# Patient Record
Sex: FEMALE | Race: WHITE | Employment: PART TIME | URBAN - METROPOLITAN AREA
[De-identification: names, ages, dates, MRNs, and addresses within clinical notes are randomized per-mention and may not be internally consistent; named-entity substitution may affect disease eponyms.]

---

## 2017-01-11 ENCOUNTER — OFFICE VISIT (OUTPATIENT)
Dept: FAMILY MEDICINE CLINIC | Age: 67
End: 2017-01-11

## 2017-01-11 VITALS
HEART RATE: 76 BPM | DIASTOLIC BLOOD PRESSURE: 74 MMHG | HEIGHT: 65 IN | SYSTOLIC BLOOD PRESSURE: 112 MMHG | TEMPERATURE: 97.1 F | WEIGHT: 239 LBS | BODY MASS INDEX: 39.82 KG/M2 | RESPIRATION RATE: 14 BRPM

## 2017-01-11 DIAGNOSIS — E11.8 TYPE 2 DIABETES MELLITUS WITH COMPLICATION, WITH LONG-TERM CURRENT USE OF INSULIN (HCC): Primary | ICD-10-CM

## 2017-01-11 DIAGNOSIS — I10 ESSENTIAL HYPERTENSION: ICD-10-CM

## 2017-01-11 DIAGNOSIS — Z79.4 TYPE 2 DIABETES MELLITUS WITH COMPLICATION, WITH LONG-TERM CURRENT USE OF INSULIN (HCC): Primary | ICD-10-CM

## 2017-01-11 DIAGNOSIS — E78.2 MIXED HYPERLIPIDEMIA: ICD-10-CM

## 2017-01-11 DIAGNOSIS — L90.0 LICHEN SCLEROSUS: ICD-10-CM

## 2017-01-11 PROCEDURE — 99214 OFFICE O/P EST MOD 30 MIN: CPT | Performed by: FAMILY MEDICINE

## 2017-03-08 ENCOUNTER — TELEPHONE (OUTPATIENT)
Dept: FAMILY MEDICINE CLINIC | Age: 67
End: 2017-03-08

## 2017-05-10 DIAGNOSIS — I10 ESSENTIAL HYPERTENSION: ICD-10-CM

## 2017-05-10 DIAGNOSIS — Z79.4 TYPE 2 DIABETES MELLITUS WITH COMPLICATION, WITH LONG-TERM CURRENT USE OF INSULIN (HCC): Primary | ICD-10-CM

## 2017-05-10 DIAGNOSIS — E78.2 MIXED HYPERLIPIDEMIA: ICD-10-CM

## 2017-05-10 DIAGNOSIS — E11.8 TYPE 2 DIABETES MELLITUS WITH COMPLICATION, WITH LONG-TERM CURRENT USE OF INSULIN (HCC): Primary | ICD-10-CM

## 2017-05-10 RX ORDER — AMLODIPINE BESYLATE 10 MG/1
TABLET ORAL
Qty: 90 TABLET | Refills: 1 | Status: SHIPPED | OUTPATIENT
Start: 2017-05-10 | End: 2017-11-06 | Stop reason: SDUPTHER

## 2017-05-11 DIAGNOSIS — Z79.4 TYPE 2 DIABETES MELLITUS WITH COMPLICATION, WITH LONG-TERM CURRENT USE OF INSULIN (HCC): ICD-10-CM

## 2017-05-11 DIAGNOSIS — E78.2 MIXED HYPERLIPIDEMIA: ICD-10-CM

## 2017-05-11 DIAGNOSIS — I10 ESSENTIAL HYPERTENSION: ICD-10-CM

## 2017-05-11 DIAGNOSIS — E11.8 TYPE 2 DIABETES MELLITUS WITH COMPLICATION, WITH LONG-TERM CURRENT USE OF INSULIN (HCC): ICD-10-CM

## 2017-05-11 LAB
ALBUMIN SERPL-MCNC: 4.3 G/DL (ref 3.9–4.9)
ALP BLD-CCNC: 64 U/L (ref 40–130)
ALT SERPL-CCNC: 41 U/L (ref 0–33)
ANION GAP SERPL CALCULATED.3IONS-SCNC: 12 MEQ/L (ref 7–13)
AST SERPL-CCNC: 36 U/L (ref 0–35)
BILIRUB SERPL-MCNC: 0.8 MG/DL (ref 0–1.2)
BUN BLDV-MCNC: 22 MG/DL (ref 8–23)
CALCIUM SERPL-MCNC: 10.1 MG/DL (ref 8.6–10.2)
CHLORIDE BLD-SCNC: 99 MEQ/L (ref 98–107)
CHOLESTEROL, TOTAL: 181 MG/DL (ref 0–199)
CO2: 31 MEQ/L (ref 22–29)
CREAT SERPL-MCNC: 0.54 MG/DL (ref 0.5–0.9)
GFR AFRICAN AMERICAN: >60
GFR NON-AFRICAN AMERICAN: >60
GLOBULIN: 2.6 G/DL (ref 2.3–3.5)
GLUCOSE BLD-MCNC: 90 MG/DL (ref 74–109)
HBA1C MFR BLD: 6.8 % (ref 4.8–5.9)
HDLC SERPL-MCNC: 50 MG/DL (ref 40–59)
LDL CHOLESTEROL CALCULATED: 97 MG/DL (ref 0–129)
POTASSIUM SERPL-SCNC: 3.8 MEQ/L (ref 3.5–5.1)
SODIUM BLD-SCNC: 142 MEQ/L (ref 132–144)
TOTAL PROTEIN: 6.9 G/DL (ref 6.4–8.1)
TRIGL SERPL-MCNC: 170 MG/DL (ref 0–200)

## 2017-05-17 ENCOUNTER — OFFICE VISIT (OUTPATIENT)
Dept: FAMILY MEDICINE CLINIC | Age: 67
End: 2017-05-17

## 2017-05-17 VITALS
SYSTOLIC BLOOD PRESSURE: 126 MMHG | DIASTOLIC BLOOD PRESSURE: 84 MMHG | HEIGHT: 65 IN | BODY MASS INDEX: 38.32 KG/M2 | WEIGHT: 230 LBS | HEART RATE: 72 BPM | RESPIRATION RATE: 12 BRPM | TEMPERATURE: 97 F

## 2017-05-17 DIAGNOSIS — Z79.4 TYPE 2 DIABETES MELLITUS WITH COMPLICATION, WITH LONG-TERM CURRENT USE OF INSULIN (HCC): Primary | ICD-10-CM

## 2017-05-17 DIAGNOSIS — E78.2 MIXED HYPERLIPIDEMIA: ICD-10-CM

## 2017-05-17 DIAGNOSIS — Z12.31 ENCOUNTER FOR SCREENING MAMMOGRAM FOR BREAST CANCER: ICD-10-CM

## 2017-05-17 DIAGNOSIS — I10 ESSENTIAL HYPERTENSION: ICD-10-CM

## 2017-05-17 DIAGNOSIS — E11.8 TYPE 2 DIABETES MELLITUS WITH COMPLICATION, WITH LONG-TERM CURRENT USE OF INSULIN (HCC): Primary | ICD-10-CM

## 2017-05-17 PROCEDURE — 3017F COLORECTAL CA SCREEN DOC REV: CPT | Performed by: FAMILY MEDICINE

## 2017-05-17 PROCEDURE — 3044F HG A1C LEVEL LT 7.0%: CPT | Performed by: FAMILY MEDICINE

## 2017-05-17 PROCEDURE — 3014F SCREEN MAMMO DOC REV: CPT | Performed by: FAMILY MEDICINE

## 2017-05-17 PROCEDURE — G8400 PT W/DXA NO RESULTS DOC: HCPCS | Performed by: FAMILY MEDICINE

## 2017-05-17 PROCEDURE — G8419 CALC BMI OUT NRM PARAM NOF/U: HCPCS | Performed by: FAMILY MEDICINE

## 2017-05-17 PROCEDURE — 99214 OFFICE O/P EST MOD 30 MIN: CPT | Performed by: FAMILY MEDICINE

## 2017-05-17 PROCEDURE — 1036F TOBACCO NON-USER: CPT | Performed by: FAMILY MEDICINE

## 2017-05-17 PROCEDURE — G8427 DOCREV CUR MEDS BY ELIG CLIN: HCPCS | Performed by: FAMILY MEDICINE

## 2017-05-17 PROCEDURE — 4040F PNEUMOC VAC/ADMIN/RCVD: CPT | Performed by: FAMILY MEDICINE

## 2017-05-17 PROCEDURE — 1090F PRES/ABSN URINE INCON ASSESS: CPT | Performed by: FAMILY MEDICINE

## 2017-05-17 PROCEDURE — 1123F ACP DISCUSS/DSCN MKR DOCD: CPT | Performed by: FAMILY MEDICINE

## 2017-05-17 ASSESSMENT — PATIENT HEALTH QUESTIONNAIRE - PHQ9
SUM OF ALL RESPONSES TO PHQ9 QUESTIONS 1 & 2: 0
1. LITTLE INTEREST OR PLEASURE IN DOING THINGS: 0
SUM OF ALL RESPONSES TO PHQ QUESTIONS 1-9: 0
2. FEELING DOWN, DEPRESSED OR HOPELESS: 0

## 2017-05-30 DIAGNOSIS — I10 ESSENTIAL HYPERTENSION: ICD-10-CM

## 2017-05-30 RX ORDER — VALSARTAN AND HYDROCHLOROTHIAZIDE 160; 12.5 MG/1; MG/1
TABLET, FILM COATED ORAL
Qty: 90 TABLET | Refills: 1 | Status: SHIPPED | OUTPATIENT
Start: 2017-05-30 | End: 2017-11-27 | Stop reason: SDUPTHER

## 2017-06-01 ENCOUNTER — HOSPITAL ENCOUNTER (OUTPATIENT)
Dept: WOMENS IMAGING | Age: 67
Discharge: HOME | End: 2017-06-01
Payer: MEDICARE

## 2017-06-01 DIAGNOSIS — Z12.31 ENCOUNTER FOR SCREENING MAMMOGRAM FOR BREAST CANCER: ICD-10-CM

## 2017-06-01 PROCEDURE — G0202 SCR MAMMO BI INCL CAD: HCPCS

## 2017-06-26 RX ORDER — METOPROLOL TARTRATE 100 MG/1
TABLET ORAL
Qty: 180 TABLET | Refills: 1 | Status: SHIPPED | OUTPATIENT
Start: 2017-06-26 | End: 2017-11-27 | Stop reason: SDUPTHER

## 2017-08-31 DIAGNOSIS — Z79.4 TYPE 2 DIABETES MELLITUS WITH COMPLICATION, WITH LONG-TERM CURRENT USE OF INSULIN (HCC): ICD-10-CM

## 2017-08-31 DIAGNOSIS — E11.8 TYPE 2 DIABETES MELLITUS WITH COMPLICATION, WITH LONG-TERM CURRENT USE OF INSULIN (HCC): ICD-10-CM

## 2017-09-07 DIAGNOSIS — L90.0 LICHEN SCLEROSUS: ICD-10-CM

## 2017-09-08 RX ORDER — HYDROCORTISONE BUTYRATE 0.1 %
CREAM (GRAM) TOPICAL
Qty: 90 G | Refills: 1 | Status: SHIPPED | OUTPATIENT
Start: 2017-09-08 | End: 2019-01-18 | Stop reason: SDUPTHER

## 2017-09-08 RX ORDER — INDAPAMIDE 1.25 MG/1
1.25 TABLET, FILM COATED ORAL EVERY MORNING
Qty: 90 TABLET | Refills: 3 | Status: SHIPPED | OUTPATIENT
Start: 2017-09-08 | End: 2018-04-27 | Stop reason: SDUPTHER

## 2017-09-08 RX ORDER — COLCHICINE 0.6 MG/1
TABLET ORAL
Qty: 30 TABLET | Refills: 1 | Status: SHIPPED | OUTPATIENT
Start: 2017-09-08 | End: 2018-04-27 | Stop reason: SDUPTHER

## 2017-09-08 RX ORDER — OXICONAZOLE NITRATE 10 MG/G
CREAM TOPICAL
Qty: 60 G | Refills: 3 | Status: SHIPPED | OUTPATIENT
Start: 2017-09-08 | End: 2019-01-18 | Stop reason: SDUPTHER

## 2017-10-30 RX ORDER — COLCHICINE 0.6 MG/1
TABLET, FILM COATED ORAL
Qty: 30 TABLET | Refills: 3 | Status: SHIPPED | OUTPATIENT
Start: 2017-10-30 | End: 2019-03-04

## 2017-11-06 RX ORDER — AMLODIPINE BESYLATE 10 MG/1
TABLET ORAL
Qty: 90 TABLET | Refills: 1 | Status: SHIPPED | OUTPATIENT
Start: 2017-11-06 | End: 2017-11-27 | Stop reason: SDUPTHER

## 2017-11-17 DIAGNOSIS — Z79.4 TYPE 2 DIABETES MELLITUS WITH COMPLICATION, WITH LONG-TERM CURRENT USE OF INSULIN (HCC): Primary | ICD-10-CM

## 2017-11-17 DIAGNOSIS — E11.8 TYPE 2 DIABETES MELLITUS WITH COMPLICATION, WITH LONG-TERM CURRENT USE OF INSULIN (HCC): Primary | ICD-10-CM

## 2017-11-17 DIAGNOSIS — E78.2 MIXED HYPERLIPIDEMIA: ICD-10-CM

## 2017-11-17 DIAGNOSIS — Z79.4 TYPE 2 DIABETES MELLITUS WITH COMPLICATION, WITH LONG-TERM CURRENT USE OF INSULIN (HCC): ICD-10-CM

## 2017-11-17 DIAGNOSIS — E11.8 TYPE 2 DIABETES MELLITUS WITH COMPLICATION, WITH LONG-TERM CURRENT USE OF INSULIN (HCC): ICD-10-CM

## 2017-11-17 DIAGNOSIS — I10 ESSENTIAL HYPERTENSION: ICD-10-CM

## 2017-11-17 LAB
ALBUMIN SERPL-MCNC: 4.2 G/DL (ref 3.9–4.9)
ALP BLD-CCNC: 65 U/L (ref 40–130)
ALT SERPL-CCNC: 51 U/L (ref 0–33)
ANION GAP SERPL CALCULATED.3IONS-SCNC: 17 MEQ/L (ref 7–13)
AST SERPL-CCNC: 45 U/L (ref 0–35)
BASOPHILS ABSOLUTE: 0 K/UL (ref 0–0.2)
BASOPHILS RELATIVE PERCENT: 0.6 %
BILIRUB SERPL-MCNC: 0.8 MG/DL (ref 0–1.2)
BUN BLDV-MCNC: 16 MG/DL (ref 8–23)
CALCIUM SERPL-MCNC: 9.5 MG/DL (ref 8.6–10.2)
CHLORIDE BLD-SCNC: 95 MEQ/L (ref 98–107)
CHOLESTEROL, TOTAL: 186 MG/DL (ref 0–199)
CO2: 26 MEQ/L (ref 22–29)
CREAT SERPL-MCNC: 0.58 MG/DL (ref 0.5–0.9)
CREATININE URINE: 168.5 MG/DL
EOSINOPHILS ABSOLUTE: 0.2 K/UL (ref 0–0.7)
EOSINOPHILS RELATIVE PERCENT: 4.4 %
GFR AFRICAN AMERICAN: >60
GFR NON-AFRICAN AMERICAN: >60
GLOBULIN: 2.7 G/DL (ref 2.3–3.5)
GLUCOSE BLD-MCNC: 160 MG/DL (ref 74–109)
HBA1C MFR BLD: 8 % (ref 4.8–5.9)
HCT VFR BLD CALC: 41.2 % (ref 37–47)
HDLC SERPL-MCNC: 48 MG/DL (ref 40–59)
HEMOGLOBIN: 14.2 G/DL (ref 12–16)
LDL CHOLESTEROL CALCULATED: 93 MG/DL (ref 0–129)
LYMPHOCYTES ABSOLUTE: 1.8 K/UL (ref 1–4.8)
LYMPHOCYTES RELATIVE PERCENT: 36.4 %
MCH RBC QN AUTO: 31.6 PG (ref 27–31.3)
MCHC RBC AUTO-ENTMCNC: 34.6 % (ref 33–37)
MCV RBC AUTO: 91.4 FL (ref 82–100)
MICROALBUMIN UR-MCNC: 8.6 MG/DL
MICROALBUMIN/CREAT UR-RTO: 51 MG/G (ref 0–30)
MONOCYTES ABSOLUTE: 0.4 K/UL (ref 0.2–0.8)
MONOCYTES RELATIVE PERCENT: 9.2 %
NEUTROPHILS ABSOLUTE: 2.4 K/UL (ref 1.4–6.5)
NEUTROPHILS RELATIVE PERCENT: 49.4 %
PDW BLD-RTO: 13.9 % (ref 11.5–14.5)
PLATELET # BLD: 175 K/UL (ref 130–400)
POTASSIUM SERPL-SCNC: 3.7 MEQ/L (ref 3.5–5.1)
RBC # BLD: 4.51 M/UL (ref 4.2–5.4)
SODIUM BLD-SCNC: 138 MEQ/L (ref 132–144)
TOTAL PROTEIN: 6.9 G/DL (ref 6.4–8.1)
TRIGL SERPL-MCNC: 223 MG/DL (ref 0–200)
WBC # BLD: 4.9 K/UL (ref 4.8–10.8)

## 2017-11-27 ENCOUNTER — OFFICE VISIT (OUTPATIENT)
Dept: FAMILY MEDICINE CLINIC | Age: 67
End: 2017-11-27

## 2017-11-27 VITALS
SYSTOLIC BLOOD PRESSURE: 138 MMHG | HEART RATE: 71 BPM | WEIGHT: 237 LBS | BODY MASS INDEX: 39.49 KG/M2 | RESPIRATION RATE: 14 BRPM | OXYGEN SATURATION: 99 % | HEIGHT: 65 IN | DIASTOLIC BLOOD PRESSURE: 88 MMHG | TEMPERATURE: 97.4 F

## 2017-11-27 DIAGNOSIS — E78.2 MIXED HYPERLIPIDEMIA: ICD-10-CM

## 2017-11-27 DIAGNOSIS — I10 ESSENTIAL HYPERTENSION: ICD-10-CM

## 2017-11-27 DIAGNOSIS — Z23 NEED FOR PNEUMOCOCCAL VACCINATION: ICD-10-CM

## 2017-11-27 DIAGNOSIS — E11.8 TYPE 2 DIABETES MELLITUS WITH COMPLICATION, WITH LONG-TERM CURRENT USE OF INSULIN (HCC): Primary | ICD-10-CM

## 2017-11-27 DIAGNOSIS — M79.672 LEFT FOOT PAIN: ICD-10-CM

## 2017-11-27 DIAGNOSIS — Z12.12 SCREENING FOR COLORECTAL CANCER: ICD-10-CM

## 2017-11-27 DIAGNOSIS — Z12.11 SCREENING FOR COLORECTAL CANCER: ICD-10-CM

## 2017-11-27 DIAGNOSIS — Z79.4 TYPE 2 DIABETES MELLITUS WITH COMPLICATION, WITH LONG-TERM CURRENT USE OF INSULIN (HCC): Primary | ICD-10-CM

## 2017-11-27 PROCEDURE — G8400 PT W/DXA NO RESULTS DOC: HCPCS | Performed by: FAMILY MEDICINE

## 2017-11-27 PROCEDURE — G0009 ADMIN PNEUMOCOCCAL VACCINE: HCPCS | Performed by: FAMILY MEDICINE

## 2017-11-27 PROCEDURE — G8427 DOCREV CUR MEDS BY ELIG CLIN: HCPCS | Performed by: FAMILY MEDICINE

## 2017-11-27 PROCEDURE — G8484 FLU IMMUNIZE NO ADMIN: HCPCS | Performed by: FAMILY MEDICINE

## 2017-11-27 PROCEDURE — 1123F ACP DISCUSS/DSCN MKR DOCD: CPT | Performed by: FAMILY MEDICINE

## 2017-11-27 PROCEDURE — 90732 PPSV23 VACC 2 YRS+ SUBQ/IM: CPT | Performed by: FAMILY MEDICINE

## 2017-11-27 PROCEDURE — G8417 CALC BMI ABV UP PARAM F/U: HCPCS | Performed by: FAMILY MEDICINE

## 2017-11-27 PROCEDURE — 1036F TOBACCO NON-USER: CPT | Performed by: FAMILY MEDICINE

## 2017-11-27 PROCEDURE — 99214 OFFICE O/P EST MOD 30 MIN: CPT | Performed by: FAMILY MEDICINE

## 2017-11-27 PROCEDURE — 4040F PNEUMOC VAC/ADMIN/RCVD: CPT | Performed by: FAMILY MEDICINE

## 2017-11-27 PROCEDURE — 3045F PR MOST RECENT HEMOGLOBIN A1C LEVEL 7.0-9.0%: CPT | Performed by: FAMILY MEDICINE

## 2017-11-27 PROCEDURE — 3014F SCREEN MAMMO DOC REV: CPT | Performed by: FAMILY MEDICINE

## 2017-11-27 PROCEDURE — 3017F COLORECTAL CA SCREEN DOC REV: CPT | Performed by: FAMILY MEDICINE

## 2017-11-27 PROCEDURE — 1090F PRES/ABSN URINE INCON ASSESS: CPT | Performed by: FAMILY MEDICINE

## 2017-11-27 RX ORDER — METOPROLOL TARTRATE 100 MG/1
100 TABLET ORAL 2 TIMES DAILY
Qty: 180 TABLET | Refills: 3 | Status: SHIPPED | OUTPATIENT
Start: 2017-11-27 | End: 2018-04-27 | Stop reason: SDUPTHER

## 2017-11-27 RX ORDER — VALSARTAN AND HYDROCHLOROTHIAZIDE 160; 12.5 MG/1; MG/1
1 TABLET, FILM COATED ORAL DAILY
Qty: 90 TABLET | Refills: 3 | Status: SHIPPED | OUTPATIENT
Start: 2017-11-27 | End: 2018-09-05

## 2017-11-27 RX ORDER — AMLODIPINE BESYLATE 10 MG/1
10 TABLET ORAL DAILY
Qty: 90 TABLET | Refills: 3 | Status: SHIPPED | OUTPATIENT
Start: 2017-11-27 | End: 2018-04-27 | Stop reason: SDUPTHER

## 2017-11-27 NOTE — PROGRESS NOTES
35-55 = Moderate Risk            <35 = High Risk    Females:  >65 = No Risk            45-65 = Moderate Risk            <45 = High Risk    NCEP Guidelines: Third Report May 2001  >59 = negative risk factor for CHD  <40 = major risk factor for CHD      LDL Calculated 11/17/2017 93  0 - 129 mg/dL Final    Sodium 11/17/2017 138  132 - 144 mEq/L Final    Potassium 11/17/2017 3.7  3.5 - 5.1 mEq/L Final    Chloride 11/17/2017 95* 98 - 107 mEq/L Final    CO2 11/17/2017 26  22 - 29 mEq/L Final    Anion Gap 11/17/2017 17* 7 - 13 mEq/L Final    Glucose 11/17/2017 160* 74 - 109 mg/dL Final    BUN 11/17/2017 16  8 - 23 mg/dL Final    CREATININE 11/17/2017 0.58  0.50 - 0.90 mg/dL Final    GFR Non- 11/17/2017 >60.0  >60 Final    Comment: >60 mL/min/1.73m2 EGFR, calc. for ages 25 and older using the  MDRD formula (not corrected for weight), is valid for stable  renal function.  GFR  11/17/2017 >60.0  >60 Final    Comment: >60 mL/min/1.73m2 EGFR, calc. for ages 25 and older using the  MDRD formula (not corrected for weight), is valid for stable  renal function.       Calcium 11/17/2017 9.5  8.6 - 10.2 mg/dL Final    Total Protein 11/17/2017 6.9  6.4 - 8.1 g/dL Final    Alb 11/17/2017 4.2  3.9 - 4.9 g/dL Final    Total Bilirubin 11/17/2017 0.8  0.0 - 1.2 mg/dL Final    Alkaline Phosphatase 11/17/2017 65  40 - 130 U/L Final    ALT 11/17/2017 51* 0 - 33 U/L Final    AST 11/17/2017 45* 0 - 35 U/L Final    Globulin 11/17/2017 2.7  2.3 - 3.5 g/dL Final    WBC 11/17/2017 4.9  4.8 - 10.8 K/uL Final    RBC 11/17/2017 4.51  4.20 - 5.40 M/uL Final    Hemoglobin 11/17/2017 14.2  12.0 - 16.0 g/dL Final    Hematocrit 11/17/2017 41.2  37.0 - 47.0 % Final    MCV 11/17/2017 91.4  82.0 - 100.0 fL Final    MCH 11/17/2017 31.6* 27.0 - 31.3 pg Final    MCHC 11/17/2017 34.6  33.0 - 37.0 % Final    RDW 11/17/2017 13.9  11.5 - 14.5 % Final    Platelets 10/39/9867 175  130 - 400 K/uL Final    clear    TMs normal    No erythema pharynx      NECK:  No masses or adenopathy palpable. No asymmetry visible. No thyromegaly. No bruits. RESPIRATORY:  Equal breath sounds / no acute respiratory distress. No wheezes,rales, or rhonchi        HEART:  Regular rhythm without murmur, rub or gallop. ABDOMEN:  Obese Soft, nontender. No masses, guarding or rebound. Normoactive bowel sounds. EXTREMITIES:   No edema in any extremity. No cyanosis or clubbing. 2+ dorsalis pedis pulses bilaterally        FOOT EXAM:    A comprehensive foot exam was performed including monofilament testing or equivalent for sensation. Normal foot, ankle, and digit range of motion and strength. Patient was found to have intact sensation, 2+ dorsalis pedis pulses and no concerning calluses, rashes, sores or foot lesions of any kind. No obvious neuropathy. Area of pain is D L foot MTPs distally    1. Type 2 diabetes mellitus with complication, with long-term current use of insulin ([address]     2. Essential hypertension  valsartan-hydrochlorothiazide (DIOVAN-HCT) 160-12.5 MG per tablet   3. Mixed hyperlipidemia     4. Need for pneumococcal vaccination  Pneumococcal polysaccharide vaccine 23-valent greater than or equal to 3yo subcutaneous/IM   5. Screening for colorectal cancer  POCT Fecal Immunochemical Test (FIT)     Increase lantus to 150U  Will take victoza refridgerated  (had been taking warm and ?suboptimal results)  L foot pain no injury   Will see pods if persists-has seen prior  Could be neuroma as at times feels like pebble  No palable abns    And again had a lengthy discussion w pt  about risks of poorly controlled diabetes including micro and macrovascular complications of DM2 including blindness,MI,CVA and death among other possibilities.  Pt aware and agrees to better compliance and adherance to instructions such as regular eye exams q 1-2 y, foot exams,and f/u regularly for hba1c with a goal of 6.5.     NO evidence of neuropathy or nephropathy at this point    Follow up as planned for hba1c and BP checks    Sooner if condition deteriorates or problems arise    Pam Ring MD

## 2017-11-29 DIAGNOSIS — M84.376A STRESS FRACTURE OF FOOT, UNSPECIFIED LATERALITY, INITIAL ENCOUNTER: Primary | ICD-10-CM

## 2017-12-01 ENCOUNTER — NURSE ONLY (OUTPATIENT)
Dept: FAMILY MEDICINE CLINIC | Age: 67
End: 2017-12-01

## 2017-12-01 DIAGNOSIS — Z12.11 SCREENING FOR COLORECTAL CANCER: ICD-10-CM

## 2017-12-01 DIAGNOSIS — Z12.12 SCREENING FOR COLORECTAL CANCER: ICD-10-CM

## 2017-12-01 PROCEDURE — G0328 FECAL BLOOD SCRN IMMUNOASSAY: HCPCS | Performed by: FAMILY MEDICINE

## 2017-12-02 LAB
CONTROL: PRESENT
HEMOCCULT STL QL: NEGATIVE

## 2018-01-23 RX ORDER — INSULIN GLARGINE 100 [IU]/ML
INJECTION, SOLUTION SUBCUTANEOUS
Qty: 120 ML | Refills: 3 | Status: SHIPPED | OUTPATIENT
Start: 2018-01-23 | End: 2018-10-25 | Stop reason: SDUPTHER

## 2018-03-26 RX ORDER — LIRAGLUTIDE 6 MG/ML
INJECTION SUBCUTANEOUS
Qty: 27 ML | Refills: 0 | Status: SHIPPED | OUTPATIENT
Start: 2018-03-26 | End: 2018-06-25 | Stop reason: SDUPTHER

## 2018-04-19 DIAGNOSIS — E11.8 TYPE 2 DIABETES MELLITUS WITH COMPLICATION, WITH LONG-TERM CURRENT USE OF INSULIN (HCC): Primary | ICD-10-CM

## 2018-04-19 DIAGNOSIS — I10 ESSENTIAL HYPERTENSION: ICD-10-CM

## 2018-04-19 DIAGNOSIS — E78.2 MIXED HYPERLIPIDEMIA: ICD-10-CM

## 2018-04-19 DIAGNOSIS — Z79.4 TYPE 2 DIABETES MELLITUS WITH COMPLICATION, WITH LONG-TERM CURRENT USE OF INSULIN (HCC): Primary | ICD-10-CM

## 2018-04-20 DIAGNOSIS — E78.2 MIXED HYPERLIPIDEMIA: ICD-10-CM

## 2018-04-20 DIAGNOSIS — Z79.4 TYPE 2 DIABETES MELLITUS WITH COMPLICATION, WITH LONG-TERM CURRENT USE OF INSULIN (HCC): ICD-10-CM

## 2018-04-20 DIAGNOSIS — I10 ESSENTIAL HYPERTENSION: ICD-10-CM

## 2018-04-20 DIAGNOSIS — E11.8 TYPE 2 DIABETES MELLITUS WITH COMPLICATION, WITH LONG-TERM CURRENT USE OF INSULIN (HCC): ICD-10-CM

## 2018-04-20 LAB
ALBUMIN SERPL-MCNC: 4.1 G/DL (ref 3.9–4.9)
ALP BLD-CCNC: 66 U/L (ref 40–130)
ALT SERPL-CCNC: 43 U/L (ref 0–33)
ANION GAP SERPL CALCULATED.3IONS-SCNC: 14 MEQ/L (ref 7–13)
AST SERPL-CCNC: 39 U/L (ref 0–35)
BILIRUB SERPL-MCNC: 0.7 MG/DL (ref 0–1.2)
BUN BLDV-MCNC: 26 MG/DL (ref 8–23)
CALCIUM SERPL-MCNC: 9.6 MG/DL (ref 8.6–10.2)
CHLORIDE BLD-SCNC: 96 MEQ/L (ref 98–107)
CHOLESTEROL, TOTAL: 169 MG/DL (ref 0–199)
CO2: 28 MEQ/L (ref 22–29)
CREAT SERPL-MCNC: 0.61 MG/DL (ref 0.5–0.9)
GFR AFRICAN AMERICAN: >60
GFR NON-AFRICAN AMERICAN: >60
GLOBULIN: 2.5 G/DL (ref 2.3–3.5)
GLUCOSE BLD-MCNC: 213 MG/DL (ref 74–109)
HBA1C MFR BLD: 8.3 % (ref 4.8–5.9)
HDLC SERPL-MCNC: 45 MG/DL (ref 40–59)
LDL CHOLESTEROL CALCULATED: 87 MG/DL (ref 0–129)
POTASSIUM SERPL-SCNC: 3.8 MEQ/L (ref 3.5–5.1)
SODIUM BLD-SCNC: 138 MEQ/L (ref 132–144)
TOTAL PROTEIN: 6.6 G/DL (ref 6.4–8.1)
TRIGL SERPL-MCNC: 186 MG/DL (ref 0–200)

## 2018-04-26 LAB — DIABETIC RETINOPATHY: NORMAL

## 2018-04-27 ENCOUNTER — OFFICE VISIT (OUTPATIENT)
Dept: FAMILY MEDICINE CLINIC | Age: 68
End: 2018-04-27
Payer: MEDICARE

## 2018-04-27 VITALS
SYSTOLIC BLOOD PRESSURE: 138 MMHG | WEIGHT: 237 LBS | HEART RATE: 78 BPM | DIASTOLIC BLOOD PRESSURE: 80 MMHG | BODY MASS INDEX: 39.49 KG/M2 | RESPIRATION RATE: 16 BRPM | HEIGHT: 65 IN | TEMPERATURE: 98.2 F

## 2018-04-27 DIAGNOSIS — E78.2 MIXED HYPERLIPIDEMIA: ICD-10-CM

## 2018-04-27 DIAGNOSIS — E11.8 TYPE 2 DIABETES MELLITUS WITH COMPLICATION, WITH LONG-TERM CURRENT USE OF INSULIN (HCC): Primary | ICD-10-CM

## 2018-04-27 DIAGNOSIS — Z79.4 TYPE 2 DIABETES MELLITUS WITH COMPLICATION, WITH LONG-TERM CURRENT USE OF INSULIN (HCC): Primary | ICD-10-CM

## 2018-04-27 DIAGNOSIS — I10 ESSENTIAL HYPERTENSION: ICD-10-CM

## 2018-04-27 DIAGNOSIS — Z12.31 ENCOUNTER FOR SCREENING MAMMOGRAM FOR BREAST CANCER: ICD-10-CM

## 2018-04-27 PROCEDURE — 99214 OFFICE O/P EST MOD 30 MIN: CPT | Performed by: FAMILY MEDICINE

## 2018-04-27 PROCEDURE — 3288F FALL RISK ASSESSMENT DOCD: CPT | Performed by: FAMILY MEDICINE

## 2018-04-27 RX ORDER — METOPROLOL TARTRATE 100 MG/1
100 TABLET ORAL 2 TIMES DAILY
Qty: 180 TABLET | Refills: 3 | Status: SHIPPED | OUTPATIENT
Start: 2018-04-27

## 2018-04-27 RX ORDER — INDAPAMIDE 1.25 MG/1
1.25 TABLET, FILM COATED ORAL EVERY MORNING
Qty: 90 TABLET | Refills: 3 | Status: SHIPPED | OUTPATIENT
Start: 2018-04-27

## 2018-04-27 RX ORDER — AMLODIPINE BESYLATE 10 MG/1
10 TABLET ORAL DAILY
Qty: 90 TABLET | Refills: 3 | Status: SHIPPED | OUTPATIENT
Start: 2018-04-27 | End: 2018-08-27 | Stop reason: SDUPTHER

## 2018-06-25 RX ORDER — LIRAGLUTIDE 6 MG/ML
INJECTION SUBCUTANEOUS
Qty: 27 ML | Refills: 1 | Status: SHIPPED | OUTPATIENT
Start: 2018-06-25 | End: 2018-08-27 | Stop reason: CLARIF

## 2018-08-17 DIAGNOSIS — Z79.4 TYPE 2 DIABETES MELLITUS WITH COMPLICATION, WITH LONG-TERM CURRENT USE OF INSULIN (HCC): Primary | ICD-10-CM

## 2018-08-17 DIAGNOSIS — I10 ESSENTIAL HYPERTENSION: ICD-10-CM

## 2018-08-17 DIAGNOSIS — E11.8 TYPE 2 DIABETES MELLITUS WITH COMPLICATION, WITH LONG-TERM CURRENT USE OF INSULIN (HCC): Primary | ICD-10-CM

## 2018-08-17 DIAGNOSIS — E78.2 MIXED HYPERLIPIDEMIA: ICD-10-CM

## 2018-08-20 ENCOUNTER — CARE COORDINATION (OUTPATIENT)
Dept: FAMILY MEDICINE CLINIC | Age: 68
End: 2018-08-20

## 2018-08-20 DIAGNOSIS — Z79.4 TYPE 2 DIABETES MELLITUS WITH COMPLICATION, WITH LONG-TERM CURRENT USE OF INSULIN (HCC): ICD-10-CM

## 2018-08-20 DIAGNOSIS — E11.8 TYPE 2 DIABETES MELLITUS WITH COMPLICATION, WITH LONG-TERM CURRENT USE OF INSULIN (HCC): ICD-10-CM

## 2018-08-20 DIAGNOSIS — E78.2 MIXED HYPERLIPIDEMIA: ICD-10-CM

## 2018-08-20 DIAGNOSIS — I10 ESSENTIAL HYPERTENSION: ICD-10-CM

## 2018-08-20 LAB
ALBUMIN SERPL-MCNC: 4.1 G/DL (ref 3.9–4.9)
ALP BLD-CCNC: 68 U/L (ref 40–130)
ALT SERPL-CCNC: 42 U/L (ref 0–33)
ANION GAP SERPL CALCULATED.3IONS-SCNC: 14 MEQ/L (ref 7–13)
AST SERPL-CCNC: 49 U/L (ref 0–35)
BILIRUB SERPL-MCNC: 0.6 MG/DL (ref 0–1.2)
BUN BLDV-MCNC: 17 MG/DL (ref 8–23)
CALCIUM SERPL-MCNC: 9.8 MG/DL (ref 8.6–10.2)
CHLORIDE BLD-SCNC: 96 MEQ/L (ref 98–107)
CHOLESTEROL, TOTAL: 175 MG/DL (ref 0–199)
CO2: 28 MEQ/L (ref 22–29)
CREAT SERPL-MCNC: 0.59 MG/DL (ref 0.5–0.9)
GFR AFRICAN AMERICAN: >60
GFR NON-AFRICAN AMERICAN: >60
GLOBULIN: 2.8 G/DL (ref 2.3–3.5)
GLUCOSE BLD-MCNC: 152 MG/DL (ref 74–109)
HBA1C MFR BLD: 8.9 % (ref 4.8–5.9)
HDLC SERPL-MCNC: 44 MG/DL (ref 40–59)
LDL CHOLESTEROL CALCULATED: 93 MG/DL (ref 0–129)
POTASSIUM SERPL-SCNC: 4.1 MEQ/L (ref 3.5–5.1)
SODIUM BLD-SCNC: 138 MEQ/L (ref 132–144)
TOTAL PROTEIN: 6.9 G/DL (ref 6.4–8.1)
TRIGL SERPL-MCNC: 190 MG/DL (ref 0–200)

## 2018-08-20 NOTE — CARE COORDINATION
Provider, MD   Cholecalciferol (VITAMIN D3) 5000 UNITS TABS Take 1 each by mouth daily     Historical Provider, MD   Cyanocobalamin (B-12 PO) Take 2.5 mg by mouth daily     Historical Provider, MD   aspirin 81 MG tablet Take 81 mg by mouth daily.       Historical Provider, MD       Future Appointments  Date Time Provider Meri Thomas   8/27/2018 10:30 AM Nino Michel MD [address] [place], [place]

## 2018-08-22 NOTE — CARE COORDINATION
Ambulatory Care Coordination Note  8/22/2018  CM Risk Score: 2  Merissa Mortality Risk Score: 2.56    ACC: Andres Rose, RN    Summary Note: Called pt to follow up on previous contact to discuss [facility], recent labs, and complete enrolment. Pt is interested in participating. She is not able to discuss at this time, but she is agreeable to meet after her appt next week to complete enrollment and for pt education assessment. Ambulatory Care Coordination Assessment    Care Coordination Protocol  Program Enrollment:  Rising Risk  Referral from Primary Care Provider:  Yes  Week 1 - Initial Assessment     Do you have all of your prescriptions and are they filled?:  Yes  Barriers to medication adherence:  Complexity of regimen  Are you able to afford your medications?:  Yes  How often do you have trouble taking your medications the way you have been told to take them?:  I always take them as prescribed. Do you have Home O2 Therapy?:  No      Is patient able to live independently?:  Yes     Current Housing:  [facility] about your patient's physical health needs, are there any symptoms or problems (risk indicators) you are unsure about that require further investigation?:  No identified areas of uncertainly or problems already being investigated   Are the patients physical health problems impacting on their mental well-being?:  No identified areas of concern   Are there any problems with your patients lifestyle behaviors (alcohol, drugs, diet, exercise) that are impacting on physical or mental well-being?:  No identified areas of concern   Do you have any other concerns about your patients mental well-being?  How would you rate their severity and impact on the patient?:  No identified areas of concern   How would you rate their home environment in terms of safety and stability (including domestic violence, insecure housing, neighbor harassment)?:  Consistently safe, supportive, 4/27/18   Samina Alvarado MD   indapamide (LOZOL) 1.25 MG tablet Take 1 tablet by mouth every morning 4/27/18   Samina Alvarado MD   LANTUS SOLOSTAR 100 UNIT/ML injection pen INJECT 140 UNITS UNDER THE SKIN NIGHTLY 1/23/18   Samina Alvarado MD   valsartan-hydrochlorothiazide (DIOVAN-HCT) 160-12.5 MG per tablet Take 1 tablet by mouth daily 11/27/17   Samina Alvarado MD   metFORMIN (GLUCOPHAGE) 500 MG tablet Take 1 tablet by mouth 2 times daily (with meals) 11/27/17   Samina Alvarado MD   COLCRYS 0.6 MG tablet Take 1 tablet by mouth daily. 10/30/17   Samina Alvarado MD   oxiconazole nitrate (OXISTAT) 1 % CREA cream Apply bid prn 9/8/17   Samina Alvarado MD   Hydrocortisone Butyrate (LOCOID) 0.1 % CREA Apply bid prn 9/8/17   Samina Alvarado MD   glucose blood VI test strips (ONE TOUCH ULTRA TEST) strip Checks sugar 3 times daily Dx:250.00-IDDM 9/5/17   Samina Alvarado MD   Insulin Pen Needle (B-D ULTRAFINE III SHORT PEN) 31G X 8 MM MISC 1 each by Does not apply route daily 5/17/17   Samina Alvarado MD   Multiple Vitamins-Minerals (ONE-A-DAY WOMENS 50 PLUS PO) Take 1 tablet by mouth daily    Historical Provider, MD KEATING WORT PO Take 300 mg by mouth daily     Historical Provider, MD   Cholecalciferol (VITAMIN D3) 5000 UNITS TABS Take 1 each by mouth daily     Historical Provider, MD   Cyanocobalamin (B-12 PO) Take 2.5 mg by mouth daily     Historical Provider, MD   aspirin 81 MG tablet Take 81 mg by mouth daily.       Historical Provider, MD       Future Appointments  Date Time Provider Meri Martha   8/27/2018 10:30 AM Samina Alvarado MD 6 [place], [place]

## 2018-08-27 ENCOUNTER — OFFICE VISIT (OUTPATIENT)
Dept: FAMILY MEDICINE CLINIC | Age: 68
End: 2018-08-27
Payer: MEDICARE

## 2018-08-27 ENCOUNTER — CARE COORDINATION (OUTPATIENT)
Dept: FAMILY MEDICINE CLINIC | Age: 68
End: 2018-08-27

## 2018-08-27 VITALS
WEIGHT: 239 LBS | BODY MASS INDEX: 39.82 KG/M2 | RESPIRATION RATE: 16 BRPM | DIASTOLIC BLOOD PRESSURE: 62 MMHG | HEIGHT: 65 IN | SYSTOLIC BLOOD PRESSURE: 122 MMHG | HEART RATE: 70 BPM | TEMPERATURE: 97.6 F

## 2018-08-27 DIAGNOSIS — E78.2 MIXED HYPERLIPIDEMIA: ICD-10-CM

## 2018-08-27 DIAGNOSIS — E11.8 TYPE 2 DIABETES MELLITUS WITH COMPLICATION, WITH LONG-TERM CURRENT USE OF INSULIN (HCC): Primary | ICD-10-CM

## 2018-08-27 DIAGNOSIS — Z12.31 ENCOUNTER FOR SCREENING MAMMOGRAM FOR BREAST CANCER: ICD-10-CM

## 2018-08-27 DIAGNOSIS — Z79.4 TYPE 2 DIABETES MELLITUS WITH COMPLICATION, WITH LONG-TERM CURRENT USE OF INSULIN (HCC): Primary | ICD-10-CM

## 2018-08-27 DIAGNOSIS — I10 ESSENTIAL HYPERTENSION: ICD-10-CM

## 2018-08-27 PROCEDURE — 99214 OFFICE O/P EST MOD 30 MIN: CPT | Performed by: FAMILY MEDICINE

## 2018-08-27 RX ORDER — AMLODIPINE BESYLATE 10 MG/1
10 TABLET ORAL DAILY
Qty: 90 TABLET | Refills: 3 | Status: SHIPPED | OUTPATIENT
Start: 2018-08-27

## 2018-08-27 ASSESSMENT — PATIENT HEALTH QUESTIONNAIRE - PHQ9
SUM OF ALL RESPONSES TO PHQ QUESTIONS 1-9: 1
1. LITTLE INTEREST OR PLEASURE IN DOING THINGS: 0
SUM OF ALL RESPONSES TO PHQ QUESTIONS 1-9: 1
SUM OF ALL RESPONSES TO PHQ9 QUESTIONS 1 & 2: 1
2. FEELING DOWN, DEPRESSED OR HOPELESS: 1

## 2018-08-27 NOTE — PROGRESS NOTES
cyanosis or clubbing. 2+ dorsalis pedis pulses bilaterally        FOOT EXAM:    A comprehensive foot exam was performed including monofilament testing or equivalent for sensation. Normal foot, ankle, and digit range of motion and strength. Patient was found to have intact sensation, 2+ dorsalis pedis pulses and no concerning calluses, rashes, sores or foot lesions of any kind. No obvious neuropathy. Diagnosis Orders   1. Type 2 diabetes mellitus with complication, with long-term current use of insulin ([facility])  blood glucose test strips (ONE TOUCH ULTRA TEST) strip    Insulin Pen Needle (B-D ULTRAFINE III SHORT PEN) 31G X 8 MM MISC   2. Essential hypertension  amLODIPine (NORVASC) 10 MG tablet   3. Mixed hyperlipidemia     4. Encounter for screening mammogram for breast cancer  MICHELLE DIGITAL SCREEN W CAD BILATERAL   add 10U reg w biggest meal  And dc victoza      And again had a lengthy discussion w pt  about risks of poorly controlled diabetes including micro and macrovascular complications of DM2 including blindness,MI,CVA and death among other possibilities. Pt aware and agrees to better compliance and adherance to instructions such as regular eye exams q 1-2 y, foot exams,and f/u regularly for hba1c with a goal of 6.5.     NO evidence of neuropathy or nephropathy at this point    Follow up as planned for hba1c and BP checks    Sooner if condition deteriorates or problems arise    Samanta Walker MD

## 2018-08-27 NOTE — CARE COORDINATION
Ambulatory Care Coordination Note  8/27/2018  CM Risk Score: 2  Merissa Mortality Risk Score: 2.56    ACC: Samuel Lay, RN    Summary Note: Pt was seen in the office today after her appt with Dr. Kennedy Garcia. Pt was started on Humalog Pen since A1c has been increasing and last result was 8.9. Pt states she had cataract surgery this summer and the recovery didn't really go as well as it could have to he was not as active as she usually is and feels that contributed. She is able to describe insulin regimen and BG ranges Dr. Kennedy Garcia has recommended. Provided and reviewed handouts for BG goa, ranges, Types of insulin and how they work, oral DM medications and how they work, A1c, Why high BG is a problem, s/s of hyper/hypoglycemia and how to to treat. Discussed at length timing of insulin with other medications and meals. Reviewed onset peak and duration times of insulin to highlight importance of self monitoring and timing of insulin with meals. Pt verbalized understanding. Discussed at length the importance of keeping BG in range to avoid or minimize complication of DM and reviewed handout of cardiovascular complications. Pt remembered she meant to ask Dr. Kennedy Garcia to change her Valsartan RX as she did not want to take the medication anymore considering the cancer warnings that have been advertised. Advised I can send a message to Dr. Kennedy Garcia about her concern and request for change. She is getting her insulin through mail or so she will start taking it when she gets it and will continue to keep a BG log.    She will call me when she starts the medication so go over her BG log and provide any needed additional education    Reinforce Education  Managing Diabetes (Carb counting, monitoring, medications, physical activity)  Insulin Management  -Review Diabetes action plan  -Medication teaching   Purpose   Side effects   Scheduling doses   Missed doses and actions to take  -Dietary implications  -Activity and Exercise   Self-monitoring for hypo or hyper glycemia   Symptoms indication need to stop and rest    Education outcomes:  Pt Caregiver Verbalized understanding      Care Coordination Interventions    Program Enrollment:  Rising Risk  Referral from Primary Care Provider:  Yes  Suggested Interventions and Community Resources  Diabetes Education:  Declined  Pharmacist:  Not Started  Zone Management Tools: In Process (Comment: DM II)       Diabetes Assessment    Medic Alert ID:  No  Meal Planning:  Avoidance of concentrated sweets   How often do you test your blood sugar?:  Daily   Do you have barriers with adherence to non-pharmacologic self-management interventions? (Nutrition/Exercise/Self-Monitoring):  No   Have you ever had to go to the ED for symptoms of low blood sugar?:  No       Do you have hyperglycemia symptoms?:  No   Do you have hypoglycemia symptoms?:  No   Last Blood Sugar Value:  146   Blood Sugar Monitoring Regimen:  Morning Fasting, At Bedtime   Blood Sugar Trends:  Steady Increase        Goals Addressed             Most Recent     Conditions and Symptoms   On track (8/27/2018)             I will schedule office visits, as directed by my provider. I will notify my provider of any symptoms that indicate a worsening of my condition. Barriers: none  Plan for overcoming my barriers: N/A  Confidence: 9/10  Anticipated Goal Completion Date: 09/30/18         Medication Management   On track (8/27/2018)             I will take my medication as directed. I will notify my provider of any problems with medications, like adverse effects or side effects. I will notify my provider/Care Coordinator if I am unable to afford my medications.     Barriers: none  Plan for overcoming my barriers: N/A  Confidence: 9/10  Anticipated Goal Completion Date: 09/30/18       Self Monitoring   On track (8/27/2018)             Self-Monitored Blood Glucose - I will notify my provider of any trends of increasing or decreasing MD   Cholecalciferol (VITAMIN D3) 5000 UNITS TABS Take 1 each by mouth daily     Historical Provider, MD   Cyanocobalamin (B-12 PO) Take 2.5 mg by mouth daily     Historical Provider, MD   aspirin 81 MG tablet Take 81 mg by mouth daily.       Historical Provider, MD       Future Appointments  Date Time Provider Meri Thomas   12/21/2018 10:30 AM SCHEDULE, LAB [facility]   12/28/2018 9:00 AM Parish Salinas MD [address] [place] [address] 7

## 2018-08-30 ENCOUNTER — TELEPHONE (OUTPATIENT)
Dept: FAMILY MEDICINE CLINIC | Age: 68
End: 2018-08-30

## 2018-08-31 DIAGNOSIS — Z79.4 TYPE 2 DIABETES MELLITUS WITH COMPLICATION, WITH LONG-TERM CURRENT USE OF INSULIN (HCC): ICD-10-CM

## 2018-08-31 DIAGNOSIS — E11.8 TYPE 2 DIABETES MELLITUS WITH COMPLICATION, WITH LONG-TERM CURRENT USE OF INSULIN (HCC): ICD-10-CM

## 2018-09-05 ENCOUNTER — TELEPHONE (OUTPATIENT)
Dept: FAMILY MEDICINE CLINIC | Age: 68
End: 2018-09-05

## 2018-09-05 RX ORDER — LOSARTAN POTASSIUM AND HYDROCHLOROTHIAZIDE 12.5; 5 MG/1; MG/1
1 TABLET ORAL DAILY
Qty: 90 TABLET | Refills: 1 | Status: SHIPPED | OUTPATIENT
Start: 2018-09-05 | End: 2019-02-28 | Stop reason: SDUPTHER

## 2018-11-02 ENCOUNTER — CARE COORDINATION (OUTPATIENT)
Dept: FAMILY MEDICINE CLINIC | Age: 68
End: 2018-11-02

## 2018-11-02 NOTE — CARE COORDINATION
Marilou Merino MD   metoprolol (LOPRESSOR) 100 MG tablet Take 1 tablet by mouth 2 times daily 4/27/18  Yes Madeline Mcknight MD   indapamide (LOZOL) 1.25 MG tablet Take 1 tablet by mouth every morning 4/27/18  Yes Madeline Mcknight MD   metFORMIN (GLUCOPHAGE) 500 MG tablet Take 1 tablet by mouth 2 times daily (with meals) 11/27/17  Yes Madeline Mcknight MD   COLCRYS 0.6 MG tablet Take 1 tablet by mouth daily. 10/30/17  Yes Madeline Mcknight MD   oxiconazole nitrate (OXISTAT) 1 % CREA cream Apply bid prn 9/8/17  Yes Madeline Mcknight MD   Hydrocortisone Butyrate (LOCOID) 0.1 % CREA Apply bid prn 9/8/17  Yes Madeline Mcknight MD   Multiple Vitamins-Minerals (ONE-A-DAY WOMENS 50 PLUS PO) Take 1 tablet by mouth daily   Yes Historical Provider, MD   ST BOURGEOIS WORT PO Take 300 mg by mouth daily    Yes Historical Provider, MD   Cholecalciferol (VITAMIN D3) 5000 UNITS TABS Take 1 each by mouth daily    Yes Historical Provider, MD   Cyanocobalamin (B-12 PO) Take 2.5 mg by mouth daily    Yes Historical Provider, MD   aspirin 81 MG tablet Take 81 mg by mouth daily.      Yes Historical Provider, MD       Future Appointments  Date Time Provider Meri Thomas   12/21/2018 10:30 AM SCHEDULE, LAB [facility]   1/7/2019 10:00 AM Madeline Mcknight MD [address] [place] [address] 7

## 2018-12-21 DIAGNOSIS — I10 ESSENTIAL HYPERTENSION: ICD-10-CM

## 2018-12-21 DIAGNOSIS — E78.2 MIXED HYPERLIPIDEMIA: ICD-10-CM

## 2018-12-21 DIAGNOSIS — E11.8 TYPE 2 DIABETES MELLITUS WITH COMPLICATION, WITH LONG-TERM CURRENT USE OF INSULIN (HCC): Primary | ICD-10-CM

## 2018-12-21 DIAGNOSIS — Z79.4 TYPE 2 DIABETES MELLITUS WITH COMPLICATION, WITH LONG-TERM CURRENT USE OF INSULIN (HCC): ICD-10-CM

## 2018-12-21 DIAGNOSIS — E11.8 TYPE 2 DIABETES MELLITUS WITH COMPLICATION, WITH LONG-TERM CURRENT USE OF INSULIN (HCC): ICD-10-CM

## 2018-12-21 DIAGNOSIS — Z79.4 TYPE 2 DIABETES MELLITUS WITH COMPLICATION, WITH LONG-TERM CURRENT USE OF INSULIN (HCC): Primary | ICD-10-CM

## 2018-12-21 LAB
ALBUMIN SERPL-MCNC: 4.2 G/DL (ref 3.9–4.9)
ALP BLD-CCNC: 64 U/L (ref 40–130)
ALT SERPL-CCNC: 33 U/L (ref 0–33)
ANION GAP SERPL CALCULATED.3IONS-SCNC: 17 MEQ/L (ref 7–13)
AST SERPL-CCNC: 36 U/L (ref 0–35)
BILIRUB SERPL-MCNC: 0.8 MG/DL (ref 0–1.2)
BUN BLDV-MCNC: 19 MG/DL (ref 8–23)
CALCIUM SERPL-MCNC: 10.5 MG/DL (ref 8.6–10.2)
CHLORIDE BLD-SCNC: 99 MEQ/L (ref 98–107)
CHOLESTEROL, TOTAL: 187 MG/DL (ref 0–199)
CO2: 27 MEQ/L (ref 22–29)
CREAT SERPL-MCNC: 0.72 MG/DL (ref 0.5–0.9)
GFR AFRICAN AMERICAN: >60
GFR NON-AFRICAN AMERICAN: >60
GLOBULIN: 3.3 G/DL (ref 2.3–3.5)
GLUCOSE BLD-MCNC: 152 MG/DL (ref 74–109)
HBA1C MFR BLD: 8.1 % (ref 4.8–5.9)
HDLC SERPL-MCNC: 48 MG/DL (ref 40–59)
LDL CHOLESTEROL CALCULATED: 88 MG/DL (ref 0–129)
POTASSIUM SERPL-SCNC: 4.1 MEQ/L (ref 3.5–5.1)
SODIUM BLD-SCNC: 143 MEQ/L (ref 132–144)
TOTAL PROTEIN: 7.5 G/DL (ref 6.4–8.1)
TRIGL SERPL-MCNC: 254 MG/DL (ref 0–200)

## 2018-12-24 ENCOUNTER — CARE COORDINATION (OUTPATIENT)
Dept: CARE COORDINATION | Age: 68
End: 2018-12-24

## 2018-12-24 NOTE — CARE COORDINATION
Ambulatory Care Coordination Note  12/24/2018  CM Risk Score: 1  Merissa Mortality Risk Score: 2.56    ACC: Sindy Su, RN    Summary Note: Called pt to follow up on progress, reinforce previous/ provide pt educations, and discuss any new issues or concerns. LVMM requesting a call back to discuss. Will mail out pt education materials: Holiday eating tips for people with DM II, All about Triglycerides, and [employer]. Will follow up with pt in the office at next appt 1/7/19. Care Coordination Interventions    Program Enrollment:  Rising Risk  Referral from Primary Care Provider:  Yes  Suggested Interventions and Community Resources  Diabetes Education:  Declined  Pharmacist:  Declined  Zone Management Tools: In Process (Comment: DM II)         Goals Addressed     None          Prior to Admission medications    Medication Sig Start Date End Date Taking?  Authorizing Provider   Liraglutide (VICTOZA) 18 MG/3ML SOPN SC injection Inject 1.8 mg into the skin daily    Historical Provider, MD   insulin glargine (LANTUS SOLOSTAR) 100 UNIT/ML injection pen INJECT 140 UNITS UNDER THE SKIN NIGHTLY  Patient taking differently: INJECT 150 UNITS UNDER THE SKIN NIGHTLY 10/26/18   Nathaly Thornton MD   losartan-hydrochlorothiazide [facility]) 50-12.5 MG per tablet Take 1 tablet by mouth daily 9/5/18   Nathaly Thornton MD   blood glucose test strips (ONE TOUCH ULTRA TEST) strip Checks sugar 3 times daily Dx:250.00-IDDM 8/31/18   Nathaly Thornton MD   amLODIPine (NORVASC) 10 MG tablet Take 1 tablet by mouth daily 8/27/18   Nathaly Thornton MD   Insulin Pen Needle (B-D ULTRAFINE III SHORT PEN) 31G X 8 MM MISC 1 each by Does not apply route daily 8/27/18   Nathaly Thornton MD   metoprolol (LOPRESSOR) 100 MG tablet Take 1 tablet by mouth 2 times daily 4/27/18   Nathaly Thornton MD   indapamide (LOZOL) 1.25 MG tablet Take 1 tablet by mouth every morning 4/27/18   Nathaly Thornton MD   metFORMIN (GLUCOPHAGE) 500 MG tablet Take 1 tablet by mouth 2 times daily (with meals) 11/27/17   Eleonora Badillo MD   COLCRYS 0.6 MG tablet Take 1 tablet by mouth daily. 10/30/17   Eleonora Badillo MD   oxiconazole nitrate (OXISTAT) 1 % CREA cream Apply bid prn 9/8/17   Eleonora Badillo MD   Hydrocortisone Butyrate (LOCOID) 0.1 % CREA Apply bid prn 9/8/17   Eleonora Badillo MD   Multiple Vitamins-Minerals (ONE-A-DAY WOMENS 50 PLUS PO) Take 1 tablet by mouth daily    Historical Provider, MD KEATING WORT PO Take 300 mg by mouth daily     Historical Provider, MD   Cholecalciferol (VITAMIN D3) 5000 UNITS TABS Take 1 each by mouth daily     Historical Provider, MD   Cyanocobalamin (B-12 PO) Take 2.5 mg by mouth daily     Historical Provider, MD   aspirin 81 MG tablet Take 81 mg by mouth daily.       Historical Provider, MD       Future Appointments  Date Time Provider Meri Martha   1/7/2019 10:00 AM Eleonora Badillo  [place], [place] [address]

## 2018-12-24 NOTE — LETTER
12/24/2018     Radha Mak  [id number]    Dear Basilio Van hope this letter finds you doing well! Rikki Ferreira MD wanted me to contact you in regards to offering some additional patient education. Below is information on the ADA Guidelines for control of diabetes along with your recent results. I have highlighted or made note of things that I like to emphasize or at least stress the importance of. I hope you find this information helpful. Please look them over and let me know if you have any questions or would like to schedule an appointment with me to discuss your health goals and lifestyle changes you can make to reach them. You can reach me at the numbers listed below. AMERICAN DIABETES ASSOCIATION GUIDELINES    The American Diabetes Association has multiple recommendations for treatment of your diabetes. These recommendations are designed to help you prevent the complications of diabetes. Their recommendations are as follows:    1. A   Hgb A1C test performed 1-2 times a year if your blood sugar is stable and             every 3 months if your treatment is being changed or your goals are not met. 2. A dilated eye exam every year. 3.   A foot exam once yearly, but more often in patients with high risk foot conditions. 4. A yearly cholesterol test.     5. A yearly urine test for protein. 6.  Blood pressure taken at each office visit. 7.  Weight at each office visit. The American Diabetes Association also has  target goals for diabetic treatment. We should be trying to obtain these goals. Their target goals and your most recent results are listed below:    1. A  Hgb A1C of 7.0% or less. Your result was   Lab Results   Component Value Date    LABA1C 8.1 (H) 12/21/2018    LABA1C 8.9 (H) 08/20/2018    LABA1C 8.3 (H) 04/20/2018       2. A total cholesterol of less than 200 mg/dl.

## 2019-01-07 ENCOUNTER — OFFICE VISIT (OUTPATIENT)
Dept: FAMILY MEDICINE CLINIC | Age: 69
End: 2019-01-07
Payer: MEDICARE

## 2019-01-07 VITALS
HEIGHT: 65 IN | DIASTOLIC BLOOD PRESSURE: 64 MMHG | SYSTOLIC BLOOD PRESSURE: 136 MMHG | BODY MASS INDEX: 39.49 KG/M2 | RESPIRATION RATE: 16 BRPM | TEMPERATURE: 97.8 F | WEIGHT: 237 LBS | HEART RATE: 80 BPM

## 2019-01-07 DIAGNOSIS — Z85.42 HISTORY OF UTERINE CANCER: ICD-10-CM

## 2019-01-07 DIAGNOSIS — I10 ESSENTIAL HYPERTENSION: ICD-10-CM

## 2019-01-07 DIAGNOSIS — Z12.11 COLON CANCER SCREENING: ICD-10-CM

## 2019-01-07 DIAGNOSIS — Z79.4 TYPE 2 DIABETES MELLITUS WITH COMPLICATION, WITH LONG-TERM CURRENT USE OF INSULIN (HCC): Primary | ICD-10-CM

## 2019-01-07 DIAGNOSIS — Z12.31 ENCOUNTER FOR SCREENING MAMMOGRAM FOR BREAST CANCER: ICD-10-CM

## 2019-01-07 DIAGNOSIS — E78.2 MIXED HYPERLIPIDEMIA: ICD-10-CM

## 2019-01-07 DIAGNOSIS — E11.8 TYPE 2 DIABETES MELLITUS WITH COMPLICATION, WITH LONG-TERM CURRENT USE OF INSULIN (HCC): Primary | ICD-10-CM

## 2019-01-07 PROCEDURE — 99214 OFFICE O/P EST MOD 30 MIN: CPT | Performed by: FAMILY MEDICINE

## 2019-01-08 ENCOUNTER — TELEPHONE (OUTPATIENT)
Dept: FAMILY MEDICINE CLINIC | Age: 69
End: 2019-01-08

## 2019-01-18 DIAGNOSIS — L90.0 LICHEN SCLEROSUS: ICD-10-CM

## 2019-01-18 RX ORDER — HYDROCORTISONE BUTYRATE 0.1 %
CREAM (GRAM) TOPICAL
Qty: 90 G | Refills: 1 | Status: SHIPPED | OUTPATIENT
Start: 2019-01-18

## 2019-01-18 RX ORDER — OXICONAZOLE NITRATE 10 MG/G
CREAM TOPICAL
Qty: 60 G | Refills: 3 | Status: SHIPPED | OUTPATIENT
Start: 2019-01-18

## 2019-02-07 ENCOUNTER — TELEPHONE (OUTPATIENT)
Dept: INTERNAL MEDICINE CLINIC | Age: 69
End: 2019-02-07

## 2019-02-07 DIAGNOSIS — R19.5 POSITIVE COLORECTAL CANCER SCREENING USING COLOGUARD TEST: ICD-10-CM

## 2019-02-07 DIAGNOSIS — Z12.11 COLON CANCER SCREENING: ICD-10-CM

## 2019-02-07 DIAGNOSIS — Z12.11 COLON CANCER SCREENING: Primary | ICD-10-CM

## 2019-02-12 DIAGNOSIS — R19.5 POSITIVE COLORECTAL CANCER SCREENING USING COLOGUARD TEST: Primary | ICD-10-CM

## 2019-02-13 ENCOUNTER — TELEPHONE (OUTPATIENT)
Dept: FAMILY MEDICINE CLINIC | Age: 69
End: 2019-02-13

## 2019-02-21 ENCOUNTER — TELEPHONE (OUTPATIENT)
Dept: ADMINISTRATIVE | Age: 69
End: 2019-02-21

## 2019-02-27 ENCOUNTER — TELEPHONE (OUTPATIENT)
Dept: PHARMACY | Facility: CLINIC | Age: 69
End: 2019-02-27

## 2019-02-27 ENCOUNTER — CARE COORDINATION (OUTPATIENT)
Dept: CARE COORDINATION | Age: 69
End: 2019-02-27

## 2019-02-27 DIAGNOSIS — Z79.4 TYPE 2 DIABETES MELLITUS WITHOUT COMPLICATION, WITH LONG-TERM CURRENT USE OF INSULIN (HCC): Primary | ICD-10-CM

## 2019-02-27 DIAGNOSIS — E11.9 TYPE 2 DIABETES MELLITUS WITHOUT COMPLICATION, WITH LONG-TERM CURRENT USE OF INSULIN (HCC): Primary | ICD-10-CM

## 2019-02-28 DIAGNOSIS — E11.8 TYPE 2 DIABETES MELLITUS WITH COMPLICATION, WITH LONG-TERM CURRENT USE OF INSULIN (HCC): ICD-10-CM

## 2019-02-28 DIAGNOSIS — Z79.4 TYPE 2 DIABETES MELLITUS WITH COMPLICATION, WITH LONG-TERM CURRENT USE OF INSULIN (HCC): ICD-10-CM

## 2019-02-28 RX ORDER — LOSARTAN POTASSIUM AND HYDROCHLOROTHIAZIDE 12.5; 5 MG/1; MG/1
1 TABLET ORAL DAILY
Qty: 90 TABLET | Refills: 1 | OUTPATIENT
Start: 2019-02-28

## 2019-02-28 RX ORDER — LOSARTAN POTASSIUM AND HYDROCHLOROTHIAZIDE 12.5; 5 MG/1; MG/1
1 TABLET ORAL DAILY
Qty: 90 TABLET | Refills: 0 | Status: SHIPPED | OUTPATIENT
Start: 2019-02-28 | End: 2019-03-04 | Stop reason: SDUPTHER

## 2019-03-04 ENCOUNTER — CARE COORDINATION (OUTPATIENT)
Dept: CARE COORDINATION | Age: 69
End: 2019-03-04

## 2019-03-04 ENCOUNTER — SCHEDULED TELEPHONE ENCOUNTER (OUTPATIENT)
Dept: PHARMACY | Facility: CLINIC | Age: 69
End: 2019-03-04

## 2019-03-04 ENCOUNTER — PATIENT MESSAGE (OUTPATIENT)
Dept: FAMILY MEDICINE CLINIC | Age: 69
End: 2019-03-04

## 2019-03-04 DIAGNOSIS — E11.8 TYPE 2 DIABETES MELLITUS WITH COMPLICATION, WITH LONG-TERM CURRENT USE OF INSULIN (HCC): Primary | ICD-10-CM

## 2019-03-04 DIAGNOSIS — E11.8 TYPE 2 DIABETES MELLITUS WITH COMPLICATION, WITH LONG-TERM CURRENT USE OF INSULIN (HCC): ICD-10-CM

## 2019-03-04 DIAGNOSIS — Z79.4 TYPE 2 DIABETES MELLITUS WITH COMPLICATION, WITH LONG-TERM CURRENT USE OF INSULIN (HCC): ICD-10-CM

## 2019-03-04 DIAGNOSIS — Z79.4 TYPE 2 DIABETES MELLITUS WITH COMPLICATION, WITH LONG-TERM CURRENT USE OF INSULIN (HCC): Primary | ICD-10-CM

## 2019-03-04 RX ORDER — COLCHICINE 0.6 MG/1
0.6 TABLET ORAL DAILY PRN
COMMUNITY

## 2019-03-04 RX ORDER — LOSARTAN POTASSIUM AND HYDROCHLOROTHIAZIDE 12.5; 5 MG/1; MG/1
1 TABLET ORAL DAILY
Qty: 90 TABLET | Refills: 3 | Status: SHIPPED | OUTPATIENT
Start: 2019-03-04

## 2019-03-18 ENCOUNTER — ANESTHESIA EVENT (OUTPATIENT)
Dept: ENDOSCOPY | Age: 69
End: 2019-03-18
Payer: MEDICARE

## 2019-03-18 ENCOUNTER — ANESTHESIA (OUTPATIENT)
Dept: ENDOSCOPY | Age: 69
End: 2019-03-18
Payer: MEDICARE

## 2019-03-18 ENCOUNTER — HOSPITAL ENCOUNTER (OUTPATIENT)
Dept: GENERAL RADIOLOGY | Age: 69
Discharge: HOME | End: 2019-03-20
Attending: SPECIALIST
Payer: MEDICARE

## 2019-03-18 ENCOUNTER — HOSPITAL ENCOUNTER (OUTPATIENT)
Age: 69
Setting detail: OUTPATIENT SURGERY
Discharge: HOME | End: 2019-03-18
Attending: SPECIALIST | Admitting: SPECIALIST
Payer: MEDICARE

## 2019-03-18 VITALS
DIASTOLIC BLOOD PRESSURE: 72 MMHG | BODY MASS INDEX: 39.49 KG/M2 | OXYGEN SATURATION: 97 % | HEART RATE: 57 BPM | WEIGHT: 237 LBS | SYSTOLIC BLOOD PRESSURE: 122 MMHG | TEMPERATURE: 98.3 F | HEIGHT: 65 IN | RESPIRATION RATE: 18 BRPM

## 2019-03-18 VITALS
RESPIRATION RATE: 23 BRPM | OXYGEN SATURATION: 94 % | SYSTOLIC BLOOD PRESSURE: 147 MMHG | DIASTOLIC BLOOD PRESSURE: 75 MMHG

## 2019-03-18 DIAGNOSIS — R52 PAIN: ICD-10-CM

## 2019-03-18 PROCEDURE — A4641 RADIOPHARM DX AGENT NOC: HCPCS | Performed by: SPECIALIST

## 2019-03-18 PROCEDURE — 6360000002 HC RX W HCPCS: Performed by: NURSE ANESTHETIST, CERTIFIED REGISTERED

## 2019-03-18 PROCEDURE — 74270 X-RAY XM COLON 1CNTRST STD: CPT

## 2019-03-18 PROCEDURE — 3700000000 HC ANESTHESIA ATTENDED CARE: Performed by: SPECIALIST

## 2019-03-18 PROCEDURE — 3700000001 HC ADD 15 MINUTES (ANESTHESIA): Performed by: SPECIALIST

## 2019-03-18 PROCEDURE — 7100000011 HC PHASE II RECOVERY - ADDTL 15 MIN: Performed by: SPECIALIST

## 2019-03-18 PROCEDURE — 2500000003 HC RX 250 WO HCPCS: Performed by: NURSE ANESTHETIST, CERTIFIED REGISTERED

## 2019-03-18 PROCEDURE — 3609027000 HC COLONOSCOPY: Performed by: SPECIALIST

## 2019-03-18 PROCEDURE — 2580000003 HC RX 258: Performed by: SPECIALIST

## 2019-03-18 PROCEDURE — 7100000010 HC PHASE II RECOVERY - FIRST 15 MIN: Performed by: SPECIALIST

## 2019-03-18 PROCEDURE — 6360000004 HC RX CONTRAST MEDICATION: Performed by: SPECIALIST

## 2019-03-18 PROCEDURE — G0121 COLON CA SCRN NOT HI RSK IND: HCPCS | Performed by: SPECIALIST

## 2019-03-18 RX ORDER — ONDANSETRON 2 MG/ML
4 INJECTION INTRAMUSCULAR; INTRAVENOUS
Status: DISCONTINUED | OUTPATIENT
Start: 2019-03-18 | End: 2019-03-18 | Stop reason: HOSPADM

## 2019-03-18 RX ORDER — LIDOCAINE HYDROCHLORIDE 10 MG/ML
INJECTION, SOLUTION EPIDURAL; INFILTRATION; INTRACAUDAL; PERINEURAL PRN
Status: DISCONTINUED | OUTPATIENT
Start: 2019-03-18 | End: 2019-03-18 | Stop reason: SDUPTHER

## 2019-03-18 RX ORDER — SODIUM CHLORIDE 0.9 % (FLUSH) 0.9 %
10 SYRINGE (ML) INJECTION EVERY 12 HOURS SCHEDULED
Status: DISCONTINUED | OUTPATIENT
Start: 2019-03-18 | End: 2019-03-18 | Stop reason: HOSPADM

## 2019-03-18 RX ORDER — SODIUM CHLORIDE 9 MG/ML
INJECTION, SOLUTION INTRAVENOUS CONTINUOUS
Status: DISCONTINUED | OUTPATIENT
Start: 2019-03-18 | End: 2019-03-18 | Stop reason: HOSPADM

## 2019-03-18 RX ORDER — LIDOCAINE HYDROCHLORIDE 10 MG/ML
1 INJECTION, SOLUTION EPIDURAL; INFILTRATION; INTRACAUDAL; PERINEURAL
Status: DISCONTINUED | OUTPATIENT
Start: 2019-03-18 | End: 2019-03-18 | Stop reason: HOSPADM

## 2019-03-18 RX ORDER — GLYCOPYRROLATE 1 MG/5 ML
SYRINGE (ML) INTRAVENOUS PRN
Status: DISCONTINUED | OUTPATIENT
Start: 2019-03-18 | End: 2019-03-18 | Stop reason: SDUPTHER

## 2019-03-18 RX ORDER — PROPOFOL 10 MG/ML
INJECTION, EMULSION INTRAVENOUS PRN
Status: DISCONTINUED | OUTPATIENT
Start: 2019-03-18 | End: 2019-03-18 | Stop reason: SDUPTHER

## 2019-03-18 RX ORDER — SODIUM CHLORIDE 0.9 % (FLUSH) 0.9 %
10 SYRINGE (ML) INJECTION PRN
Status: DISCONTINUED | OUTPATIENT
Start: 2019-03-18 | End: 2019-03-18 | Stop reason: HOSPADM

## 2019-03-18 RX ADMIN — LIDOCAINE HYDROCHLORIDE 30 MG: 10 INJECTION, SOLUTION EPIDURAL; INFILTRATION; INTRACAUDAL; PERINEURAL at 10:21

## 2019-03-18 RX ADMIN — PROPOFOL 50 MG: 10 INJECTION, EMULSION INTRAVENOUS at 10:21

## 2019-03-18 RX ADMIN — SODIUM CHLORIDE: 9 INJECTION, SOLUTION INTRAVENOUS at 09:54

## 2019-03-18 RX ADMIN — PROPOFOL 50 MG: 10 INJECTION, EMULSION INTRAVENOUS at 10:27

## 2019-03-18 RX ADMIN — Medication 0.2 MG: at 10:21

## 2019-03-18 RX ADMIN — PROPOFOL 50 MG: 10 INJECTION, EMULSION INTRAVENOUS at 10:33

## 2019-03-18 RX ADMIN — PROPOFOL 50 MG: 10 INJECTION, EMULSION INTRAVENOUS at 10:37

## 2019-03-18 RX ADMIN — PROPOFOL 50 MG: 10 INJECTION, EMULSION INTRAVENOUS at 10:23

## 2019-03-18 RX ADMIN — PROPOFOL 50 MG: 10 INJECTION, EMULSION INTRAVENOUS at 10:25

## 2019-03-18 RX ADMIN — BARIUM SULFATE 1000 ML: 1.05 SUSPENSION ORAL; RECTAL at 13:17

## 2019-03-18 ASSESSMENT — PAIN - FUNCTIONAL ASSESSMENT: PAIN_FUNCTIONAL_ASSESSMENT: 0-10

## 2019-03-26 ENCOUNTER — TELEPHONE (OUTPATIENT)
Dept: FAMILY MEDICINE CLINIC | Age: 69
End: 2019-03-26

## 2019-03-27 ENCOUNTER — CARE COORDINATION (OUTPATIENT)
Dept: CARE COORDINATION | Age: 69
End: 2019-03-27

## 2019-04-01 ENCOUNTER — CARE COORDINATION (OUTPATIENT)
Dept: CARE COORDINATION | Age: 69
End: 2019-04-01

## 2019-04-01 NOTE — CARE COORDINATION
Ambulatory Care Coordination Note  4/1/2019  CM Risk Score: 1  Merissa Mortality Risk Score: 3    ACC: Birgit Spears, RN    Summary Note: Called pt to follow up on progress, reinforce previous/ provide pt educations, and discuss any new issues or concerns. LVMM requesting a call back to discuss. Will send [employer] message in the meantime. Care Coordination Interventions    Program Enrollment:  Rising Risk  Referral from Primary Care Provider:  Yes  Suggested Interventions and Community Resources  Diabetes Education:  Declined  Pharmacist:  Completed (Comment: Referral created 2/27/19)  Zone Management Tools: In Process (Comment: DM II)  Other Services or Interventions:  LOC and POS options education 2/27/19         Goals Addressed     None          Prior to Admission medications    Medication Sig Start Date End Date Taking?  Authorizing Provider   Omega-3 Fatty Acids (OMEGA-3 FISH OIL PO) Take 350 mg by mouth daily    Historical Provider, MD   Omega-3 Fatty Acids (FISH OIL PO) Take by mouth Provinal omega-7 fish oil 420 mg by mouth daily    Historical Provider, MD   NONFORMULARY HL 12- Take 1 capsule by mouth daily    Historical Provider, MD   NONFORMULARY Biofilam seaweed extract- Take 1 capsule by mouth daily    Historical Provider, MD   insulin glargine (LANTUS SOLOSTAR) 100 UNIT/ML injection pen Inject 155 Units into the skin nightly    Historical Provider, MD   colchicine (COLCRYS) 0.6 MG tablet Take 0.6 mg by mouth daily as needed (gout)    Historical Provider, MD   metFORMIN (GLUCOPHAGE) 1000 MG tablet Take 1 tablet by mouth 2 times daily (with meals) 3/4/19   Marylu Grande MD   blood glucose test strips (ONE TOUCH ULTRA TEST) strip Checks sugar 3 times daily Dx:250.00-IDDM 3/4/19   Marylu Grande MD   losartan-hydrochlorothiazide [facility]) 50-12.5 MG per tablet Take 1 tablet by mouth daily 3/4/19   Marylu Grande MD   Liraglutide (VICTOZA) 18 MG/3ML SOPN SC injection Inject 1.8 mg into the skin daily 1/28/19   Archana Brooks MD   oxiconazole nitrate (OXISTAT) 1 % CREA cream Apply bid prn  Patient taking differently: Apply bid prn itching and burning associated with lichen sclerosus 0/04/75   Archana Brooks MD   Hydrocortisone Butyrate (LOCOID) 0.1 % CREA Apply bid prn  Patient taking differently: Apply bid prn itching and burning associated with lichen sclerosus 2/08/19   Archana Brooks MD   insulin lispro (HUMALOG KWIKPEN) 100 UNIT/ML pen Inject 10 Units into the skin 3 times daily (before meals) 1/7/19   Archana Brooks MD   amLODIPine (NORVASC) 10 MG tablet Take 1 tablet by mouth daily 8/27/18   Archana Brooks MD   Insulin Pen Needle (B-D ULTRAFINE III SHORT PEN) 31G X 8 MM MISC 1 each by Does not apply route daily 8/27/18   Archana Brooks MD   metoprolol (LOPRESSOR) 100 MG tablet Take 1 tablet by mouth 2 times daily 4/27/18   Archana Brooks MD   indapamide (LOZOL) 1.25 MG tablet Take 1 tablet by mouth every morning 4/27/18   Archana Brooks MD   Multiple Vitamins-Minerals (ONE-A-DAY WOMENS 50 PLUS PO) Take 1 tablet by mouth daily    Historical Provider, MD   ST BOURGEOIS WORT PO Take 300 mg by mouth daily     Historical Provider, MD   Cholecalciferol (VITAMIN D3) 5000 UNITS TABS Take 1 each by mouth daily     Historical Provider, MD   Cyanocobalamin (B-12 PO) Take 5,000 mcg by mouth daily     Historical Provider, MD   aspirin 81 MG tablet Take 81 mg by mouth daily. Historical Provider, MD       No future appointments.

## 2019-04-08 ENCOUNTER — PATIENT MESSAGE (OUTPATIENT)
Dept: CARE COORDINATION | Age: 69
End: 2019-04-08

## 2019-04-08 ENCOUNTER — CARE COORDINATION (OUTPATIENT)
Dept: CARE COORDINATION | Age: 69
End: 2019-04-08

## 2019-04-08 NOTE — TELEPHONE ENCOUNTER
From: Modesto Montero  To: Carmelo Garcia RN  Sent: 4/8/2019 8:48 AM EDT  Subject: RE:Care Coordination follow up    I'm staying with Dr. Vincent Irizarry  ----- Message -----  From: Nurse Koki Higgins: 4/1/2019 10:57 AM EDT  To: Modesto Montero  Subject: Care Coordination follow up  Good morning! I am just following up on the voicemail messages I had left you today and last week. I wanted to follow up on our previous discussions and the patient education materials mailed out to you. I would also be happy to schedule your initial appointment with your new Primary Care Provider, Dr. Manuela Cheng. Sourav contact me at [phone] or the office at [phone].      In good health,  Carmelo Garcia RN  Nurse Care Coordinator

## 2019-04-08 NOTE — CARE COORDINATION
Ambulatory Care Coordination Note  4/8/2019  CM Risk Score: 1  Merissa Mortality Risk Score: 3    ACC: Roxana Blevins, RN    Summary Note: Pt replied to previous [employer] message for follow up. Pt states she will be following Dr. Angel Jamison. [employer] message reply that pt will be Dc'd from [facility] services as no longer seeing a [facility] Provider. Care Coordination Interventions    Program Enrollment:  Rising Risk  Referral from Primary Care Provider:  Yes  Suggested Interventions and Community Resources  Diabetes Education:  Declined  Pharmacist:  Completed (Comment: Referral created 2/27/19)  Zone Management Tools:  Completed (Comment: DM II)  Other Services or Interventions:  LOC and POS options education 2/27/19         Goals Addressed                 This Visit's Progress     COMPLETED: Medication Management        I will take my medication as directed. I will notify my provider of any problems with medications, like adverse effects or side effects. I will notify my provider/Care Coordinator if I am unable to afford my medications. Barriers: Overwhelmed by complexity of regimen  Plan for overcoming my barriers: Will work with [facility] and pharmacy  Confidence: 9/10  Anticipated Goal Completion Date: 3/30/19            Prior to Admission medications    Medication Sig Start Date End Date Taking?  Authorizing Provider   Omega-3 Fatty Acids (OMEGA-3 FISH OIL PO) Take 350 mg by mouth daily    Historical Provider, MD   Omega-3 Fatty Acids (FISH OIL PO) Take by mouth Provinal omega-7 fish oil 420 mg by mouth daily    Historical Provider, MD   NONFORMULARY HL 12- Take 1 capsule by mouth daily    Historical Provider, MD   NONFORMULARY Biofilam seaweed extract- Take 1 capsule by mouth daily    Historical Provider, MD   insulin glargine (LANTUS SOLOSTAR) 100 UNIT/ML injection pen Inject 155 Units into the skin nightly    Historical Provider, MD   colchicine (COLCRYS) 0.6 MG tablet Take 0.6 mg by mouth daily as needed (gout) Historical Provider, MD   metFORMIN (GLUCOPHAGE) 1000 MG tablet Take 1 tablet by mouth 2 times daily (with meals) 3/4/19   Debra De La Cruz MD   blood glucose test strips (ONE TOUCH ULTRA TEST) strip Checks sugar 3 times daily Dx:250.00-IDDM 3/4/19   Debra De La Cruz MD   losartan-hydrochlorothiazide [facility]) 50-12.5 MG per tablet Take 1 tablet by mouth daily 3/4/19   Debra De La Cruz MD   Liraglutide (VICTOZA) 18 MG/3ML SOPN SC injection Inject 1.8 mg into the skin daily 1/28/19   Debra De La Cruz MD   oxiconazole nitrate (OXISTAT) 1 % CREA cream Apply bid prn  Patient taking differently: Apply bid prn itching and burning associated with lichen sclerosus 6/20/54   Debra De La Cruz MD   Hydrocortisone Butyrate (LOCOID) 0.1 % CREA Apply bid prn  Patient taking differently: Apply bid prn itching and burning associated with lichen sclerosus 5/25/34   Debra De La Cruz MD   insulin lispro (HUMALOG KWIKPEN) 100 UNIT/ML pen Inject 10 Units into the skin 3 times daily (before meals) 1/7/19   Debra De La Cruz MD   amLODIPine (NORVASC) 10 MG tablet Take 1 tablet by mouth daily 8/27/18   Debra De La Cruz MD   Insulin Pen Needle (B-D ULTRAFINE III SHORT PEN) 31G X 8 MM MISC 1 each by Does not apply route daily 8/27/18   Debra De La Cruz MD   metoprolol (LOPRESSOR) 100 MG tablet Take 1 tablet by mouth 2 times daily 4/27/18   Debra De La Cruz MD   indapamide (LOZOL) 1.25 MG tablet Take 1 tablet by mouth every morning 4/27/18   Debra De La Cruz MD   Multiple Vitamins-Minerals (ONE-A-DAY WOMENS 50 PLUS PO) Take 1 tablet by mouth daily    Historical Provider, MD   ST BOURGEOIS WORT PO Take 300 mg by mouth daily     Historical Provider, MD   Cholecalciferol (VITAMIN D3) 5000 UNITS TABS Take 1 each by mouth daily     Historical Provider, MD   Cyanocobalamin (B-12 PO) Take 5,000 mcg by mouth daily     Historical Provider, MD   aspirin 81 MG tablet Take 81 mg by mouth daily.       Historical Provider, MD No future appointments.

## 2023-03-02 PROBLEM — E66.813 CLASS 3 SEVERE OBESITY DUE TO EXCESS CALORIES WITH SERIOUS COMORBIDITY AND BODY MASS INDEX (BMI) OF 40.0 TO 44.9 IN ADULT: Status: ACTIVE | Noted: 2023-03-02

## 2023-03-02 PROBLEM — M10.9 GOUT: Status: ACTIVE | Noted: 2023-03-02

## 2023-03-02 PROBLEM — K00.2: Status: ACTIVE | Noted: 2023-03-02

## 2023-03-02 PROBLEM — E79.0 HYPERURICEMIA: Status: ACTIVE | Noted: 2023-03-02

## 2023-03-02 PROBLEM — I10 ESSENTIAL HYPERTENSION: Status: ACTIVE | Noted: 2023-03-02

## 2023-03-02 PROBLEM — Z23 NEED FOR VACCINATION: Status: ACTIVE | Noted: 2023-03-02

## 2023-03-02 PROBLEM — E66.812 CLASS 2 OBESITY WITH ALVEOLAR HYPOVENTILATION, SERIOUS COMORBIDITY, AND BODY MASS INDEX (BMI) OF 36.0 TO 36.9 IN ADULT: Status: ACTIVE | Noted: 2023-03-02

## 2023-03-02 PROBLEM — M25.561 BILATERAL KNEE PAIN: Status: ACTIVE | Noted: 2023-03-02

## 2023-03-02 PROBLEM — E11.65 TYPE 2 DIABETES MELLITUS WITH HYPERGLYCEMIA, WITH LONG-TERM CURRENT USE OF INSULIN (MULTI): Status: ACTIVE | Noted: 2023-03-02

## 2023-03-02 PROBLEM — L90.0 LICHEN SCLEROSUS: Status: ACTIVE | Noted: 2023-03-02

## 2023-03-02 PROBLEM — N18.1 CKD STAGE 1 DUE TO TYPE 2 DIABETES MELLITUS (MULTI): Status: ACTIVE | Noted: 2023-03-02

## 2023-03-02 PROBLEM — E66.01 CLASS 3 SEVERE OBESITY DUE TO EXCESS CALORIES WITH SERIOUS COMORBIDITY AND BODY MASS INDEX (BMI) OF 40.0 TO 44.9 IN ADULT (MULTI): Status: ACTIVE | Noted: 2023-03-02

## 2023-03-02 PROBLEM — Z12.31 ENCOUNTER FOR SCREENING MAMMOGRAM FOR BREAST CANCER: Status: ACTIVE | Noted: 2023-03-02

## 2023-03-02 PROBLEM — E11.22 CKD STAGE 1 DUE TO TYPE 2 DIABETES MELLITUS (MULTI): Status: ACTIVE | Noted: 2023-03-02

## 2023-03-02 PROBLEM — M17.11 PRIMARY OSTEOARTHRITIS OF RIGHT KNEE: Status: ACTIVE | Noted: 2023-03-02

## 2023-03-02 PROBLEM — Z96.659 STATUS POST TOTAL KNEE REPLACEMENT: Status: ACTIVE | Noted: 2023-03-02

## 2023-03-02 PROBLEM — M25.562 BILATERAL KNEE PAIN: Status: ACTIVE | Noted: 2023-03-02

## 2023-03-02 PROBLEM — E78.5 DYSLIPIDEMIA: Status: ACTIVE | Noted: 2023-03-02

## 2023-03-02 PROBLEM — R31.9 HEMATURIA: Status: ACTIVE | Noted: 2023-03-02

## 2023-03-02 PROBLEM — Z79.4 TYPE 2 DIABETES MELLITUS WITH HYPERGLYCEMIA, WITH LONG-TERM CURRENT USE OF INSULIN (MULTI): Status: ACTIVE | Noted: 2023-03-02

## 2023-03-02 PROBLEM — E11.3393: Status: ACTIVE | Noted: 2023-03-02

## 2023-03-02 PROBLEM — N39.0 UTI (URINARY TRACT INFECTION): Status: ACTIVE | Noted: 2023-03-02

## 2023-03-02 PROBLEM — M17.12 ARTHRITIS OF KNEE, LEFT: Status: ACTIVE | Noted: 2023-03-02

## 2023-03-02 PROBLEM — E66.2 CLASS 2 OBESITY WITH ALVEOLAR HYPOVENTILATION, SERIOUS COMORBIDITY, AND BODY MASS INDEX (BMI) OF 36.0 TO 36.9 IN ADULT (MULTI): Status: ACTIVE | Noted: 2023-03-02

## 2023-03-02 RX ORDER — LOSARTAN POTASSIUM AND HYDROCHLOROTHIAZIDE 12.5; 5 MG/1; MG/1
1 TABLET ORAL DAILY
COMMUNITY
Start: 2021-03-05 | End: 2023-03-31 | Stop reason: SDUPTHER

## 2023-03-02 RX ORDER — LIRAGLUTIDE 6 MG/ML
1.8 INJECTION SUBCUTANEOUS DAILY
COMMUNITY
Start: 2019-05-06 | End: 2023-11-06

## 2023-03-02 RX ORDER — BLOOD SUGAR DIAGNOSTIC
STRIP MISCELLANEOUS 3 TIMES DAILY
COMMUNITY
Start: 2019-05-06 | End: 2023-11-06

## 2023-03-02 RX ORDER — CHOLECALCIFEROL (VITAMIN D3) 25 MCG
2500 TABLET,CHEWABLE ORAL
COMMUNITY
Start: 2019-05-06 | End: 2023-04-14 | Stop reason: ALTCHOICE

## 2023-03-02 RX ORDER — ST. JOHN'S WORT 300 MG
300 CAPSULE ORAL EVERY EVENING
COMMUNITY

## 2023-03-02 RX ORDER — INSULIN LISPRO 100 [IU]/ML
12 INJECTION, SOLUTION INTRAVENOUS; SUBCUTANEOUS
COMMUNITY
Start: 2019-05-06 | End: 2023-10-13

## 2023-03-02 RX ORDER — OXICONAZOLE NITRATE 10 MG/G
CREAM TOPICAL 2 TIMES DAILY
COMMUNITY
Start: 2019-05-06 | End: 2023-04-14 | Stop reason: ALTCHOICE

## 2023-03-02 RX ORDER — METOPROLOL TARTRATE 100 MG/1
100 TABLET ORAL 2 TIMES DAILY
COMMUNITY
Start: 2019-05-06 | End: 2023-08-14 | Stop reason: SDUPTHER

## 2023-03-02 RX ORDER — ASPIRIN 81 MG/1
81 TABLET ORAL DAILY
COMMUNITY
Start: 2019-05-06

## 2023-03-02 RX ORDER — AMOXICILLIN 500 MG/1
500 TABLET, FILM COATED ORAL
COMMUNITY
Start: 2021-12-28 | End: 2024-02-09 | Stop reason: ENTERED-IN-ERROR

## 2023-03-02 RX ORDER — COLCHICINE 0.6 MG/1
0.6 TABLET ORAL
COMMUNITY
Start: 2019-05-06 | End: 2023-08-21

## 2023-03-02 RX ORDER — AMLODIPINE BESYLATE 5 MG/1
5 TABLET ORAL DAILY
COMMUNITY
Start: 2019-05-06 | End: 2023-04-14 | Stop reason: SDUPTHER

## 2023-03-02 RX ORDER — INDAPAMIDE 1.25 MG/1
1.25 TABLET ORAL DAILY
COMMUNITY
Start: 2021-03-05 | End: 2023-04-14 | Stop reason: SDUPTHER

## 2023-03-02 RX ORDER — INSULIN GLARGINE 100 [IU]/ML
100 INJECTION, SOLUTION SUBCUTANEOUS NIGHTLY
COMMUNITY
Start: 2019-05-06 | End: 2023-08-10 | Stop reason: SDUPTHER

## 2023-03-02 RX ORDER — MV/FA/DHA/EPA/FISH OIL/SAW/GNK 400MCG-200
1 COMBINATION PACKAGE (EA) ORAL DAILY
COMMUNITY
Start: 2019-05-06 | End: 2023-04-14 | Stop reason: ALTCHOICE

## 2023-03-02 RX ORDER — ACETAMINOPHEN 500 MG
TABLET ORAL
COMMUNITY
End: 2024-02-09 | Stop reason: ENTERED-IN-ERROR

## 2023-03-02 RX ORDER — HYDROCORTISONE 1 %
CREAM (GRAM) TOPICAL
COMMUNITY
Start: 2021-01-05 | End: 2023-04-14 | Stop reason: SDUPTHER

## 2023-03-31 DIAGNOSIS — I10 ESSENTIAL HYPERTENSION: ICD-10-CM

## 2023-03-31 RX ORDER — LOSARTAN POTASSIUM AND HYDROCHLOROTHIAZIDE 12.5; 5 MG/1; MG/1
1 TABLET ORAL DAILY
Qty: 30 TABLET | Refills: 0 | Status: SHIPPED | OUTPATIENT
Start: 2023-03-31 | End: 2023-08-01 | Stop reason: SDUPTHER

## 2023-04-04 LAB
ALANINE AMINOTRANSFERASE (SGPT) (U/L) IN SER/PLAS: 24 U/L (ref 7–45)
ALBUMIN (G/DL) IN SER/PLAS: 3.9 G/DL (ref 3.4–5)
ALKALINE PHOSPHATASE (U/L) IN SER/PLAS: 48 U/L (ref 33–136)
ANION GAP IN SER/PLAS: 13 MMOL/L (ref 10–20)
ASPARTATE AMINOTRANSFERASE (SGOT) (U/L) IN SER/PLAS: 24 U/L (ref 9–39)
BILIRUBIN TOTAL (MG/DL) IN SER/PLAS: 1 MG/DL (ref 0–1.2)
CALCIUM (MG/DL) IN SER/PLAS: 9.7 MG/DL (ref 8.6–10.3)
CARBON DIOXIDE, TOTAL (MMOL/L) IN SER/PLAS: 30 MMOL/L (ref 21–32)
CHLORIDE (MMOL/L) IN SER/PLAS: 100 MMOL/L (ref 98–107)
CREATININE (MG/DL) IN SER/PLAS: 0.86 MG/DL (ref 0.5–1.05)
ESTIMATED AVERAGE GLUCOSE FOR HBA1C: 154 MG/DL
GFR FEMALE: 72 ML/MIN/1.73M2
GLUCOSE (MG/DL) IN SER/PLAS: 125 MG/DL (ref 74–99)
HEMOGLOBIN A1C/HEMOGLOBIN TOTAL IN BLOOD: 7 %
POTASSIUM (MMOL/L) IN SER/PLAS: 3.6 MMOL/L (ref 3.5–5.3)
PROTEIN TOTAL: 6.9 G/DL (ref 6.4–8.2)
SODIUM (MMOL/L) IN SER/PLAS: 139 MMOL/L (ref 136–145)
UREA NITROGEN (MG/DL) IN SER/PLAS: 21 MG/DL (ref 6–23)

## 2023-04-11 PROBLEM — M25.562 BILATERAL KNEE PAIN: Status: RESOLVED | Noted: 2023-03-02 | Resolved: 2023-04-11

## 2023-04-11 PROBLEM — M25.561 BILATERAL KNEE PAIN: Status: RESOLVED | Noted: 2023-03-02 | Resolved: 2023-04-11

## 2023-04-11 PROBLEM — K00.2: Status: RESOLVED | Noted: 2023-03-02 | Resolved: 2023-04-11

## 2023-04-11 PROBLEM — N39.0 UTI (URINARY TRACT INFECTION): Status: RESOLVED | Noted: 2023-03-02 | Resolved: 2023-04-11

## 2023-04-11 PROBLEM — E11.3393: Status: RESOLVED | Noted: 2023-03-02 | Resolved: 2023-04-11

## 2023-04-14 ENCOUNTER — OFFICE VISIT (OUTPATIENT)
Dept: PRIMARY CARE | Facility: CLINIC | Age: 73
End: 2023-04-14
Payer: MEDICARE

## 2023-04-14 VITALS
DIASTOLIC BLOOD PRESSURE: 64 MMHG | HEIGHT: 65 IN | OXYGEN SATURATION: 97 % | WEIGHT: 238 LBS | SYSTOLIC BLOOD PRESSURE: 120 MMHG | TEMPERATURE: 97.5 F | HEART RATE: 76 BPM | RESPIRATION RATE: 16 BRPM | BODY MASS INDEX: 39.65 KG/M2

## 2023-04-14 DIAGNOSIS — E11.22 CKD STAGE 1 DUE TO TYPE 2 DIABETES MELLITUS (MULTI): ICD-10-CM

## 2023-04-14 DIAGNOSIS — Z00.00 ENCOUNTER FOR MEDICARE ANNUAL WELLNESS EXAM: Primary | ICD-10-CM

## 2023-04-14 DIAGNOSIS — N18.1 CKD STAGE 1 DUE TO TYPE 2 DIABETES MELLITUS (MULTI): ICD-10-CM

## 2023-04-14 DIAGNOSIS — Z79.4 TYPE 2 DIABETES MELLITUS WITH HYPERGLYCEMIA, WITH LONG-TERM CURRENT USE OF INSULIN (MULTI): ICD-10-CM

## 2023-04-14 DIAGNOSIS — I10 ESSENTIAL HYPERTENSION: ICD-10-CM

## 2023-04-14 DIAGNOSIS — Z00.00 ROUTINE GENERAL MEDICAL EXAMINATION AT HEALTH CARE FACILITY: ICD-10-CM

## 2023-04-14 DIAGNOSIS — Z12.31 ENCOUNTER FOR SCREENING MAMMOGRAM FOR BREAST CANCER: ICD-10-CM

## 2023-04-14 DIAGNOSIS — E78.2 MIXED HYPERLIPIDEMIA: ICD-10-CM

## 2023-04-14 DIAGNOSIS — E11.65 TYPE 2 DIABETES MELLITUS WITH HYPERGLYCEMIA, WITH LONG-TERM CURRENT USE OF INSULIN (MULTI): ICD-10-CM

## 2023-04-14 DIAGNOSIS — M10.9 GOUT, UNSPECIFIED CAUSE, UNSPECIFIED CHRONICITY, UNSPECIFIED SITE: ICD-10-CM

## 2023-04-14 DIAGNOSIS — L90.0 LICHEN SCLEROSUS: ICD-10-CM

## 2023-04-14 PROBLEM — E78.5 DYSLIPIDEMIA: Status: RESOLVED | Noted: 2023-03-02 | Resolved: 2023-04-14

## 2023-04-14 PROBLEM — Z85.42 HISTORY OF UTERINE CANCER: Status: RESOLVED | Noted: 2023-04-14 | Resolved: 2023-04-14

## 2023-04-14 PROCEDURE — G0439 PPPS, SUBSEQ VISIT: HCPCS | Performed by: FAMILY MEDICINE

## 2023-04-14 PROCEDURE — 3078F DIAST BP <80 MM HG: CPT | Performed by: FAMILY MEDICINE

## 2023-04-14 PROCEDURE — 1159F MED LIST DOCD IN RCRD: CPT | Performed by: FAMILY MEDICINE

## 2023-04-14 PROCEDURE — 3074F SYST BP LT 130 MM HG: CPT | Performed by: FAMILY MEDICINE

## 2023-04-14 PROCEDURE — 1036F TOBACCO NON-USER: CPT | Performed by: FAMILY MEDICINE

## 2023-04-14 PROCEDURE — 1160F RVW MEDS BY RX/DR IN RCRD: CPT | Performed by: FAMILY MEDICINE

## 2023-04-14 PROCEDURE — 99213 OFFICE O/P EST LOW 20 MIN: CPT | Performed by: FAMILY MEDICINE

## 2023-04-14 PROCEDURE — 1170F FXNL STATUS ASSESSED: CPT | Performed by: FAMILY MEDICINE

## 2023-04-14 PROCEDURE — 3051F HG A1C>EQUAL 7.0%<8.0%: CPT | Performed by: FAMILY MEDICINE

## 2023-04-14 PROCEDURE — 3066F NEPHROPATHY DOC TX: CPT | Performed by: FAMILY MEDICINE

## 2023-04-14 RX ORDER — MAGNESIUM 250 MG
1 TABLET ORAL EVERY EVENING
COMMUNITY

## 2023-04-14 RX ORDER — HYDROCORTISONE 1 %
CREAM (GRAM) TOPICAL 2 TIMES DAILY
Qty: 90 G | Refills: 3 | Status: SHIPPED | OUTPATIENT
Start: 2023-04-14 | End: 2023-08-14 | Stop reason: SDUPTHER

## 2023-04-14 RX ORDER — INDAPAMIDE 1.25 MG/1
1.25 TABLET ORAL DAILY
Qty: 90 TABLET | Refills: 3 | Status: SHIPPED | OUTPATIENT
Start: 2023-04-14 | End: 2023-08-14 | Stop reason: SDUPTHER

## 2023-04-14 RX ORDER — CALCIUM CARB, CITRATE, MALATE 250 MG
1 CAPSULE ORAL DAILY
COMMUNITY
End: 2024-02-09 | Stop reason: ENTERED-IN-ERROR

## 2023-04-14 RX ORDER — AMLODIPINE BESYLATE 5 MG/1
5 TABLET ORAL DAILY
Qty: 90 TABLET | Refills: 3 | Status: SHIPPED | OUTPATIENT
Start: 2023-04-14 | End: 2023-08-14 | Stop reason: SDUPTHER

## 2023-04-14 ASSESSMENT — PATIENT HEALTH QUESTIONNAIRE - PHQ9
SUM OF ALL RESPONSES TO PHQ9 QUESTIONS 1 AND 2: 2
1. LITTLE INTEREST OR PLEASURE IN DOING THINGS: SEVERAL DAYS
10. IF YOU CHECKED OFF ANY PROBLEMS, HOW DIFFICULT HAVE THESE PROBLEMS MADE IT FOR YOU TO DO YOUR WORK, TAKE CARE OF THINGS AT HOME, OR GET ALONG WITH OTHER PEOPLE: SOMEWHAT DIFFICULT
2. FEELING DOWN, DEPRESSED OR HOPELESS: SEVERAL DAYS

## 2023-04-14 ASSESSMENT — ENCOUNTER SYMPTOMS
DEPRESSION: 0
LOSS OF SENSATION IN FEET: 1
OCCASIONAL FEELINGS OF UNSTEADINESS: 0

## 2023-04-14 ASSESSMENT — ACTIVITIES OF DAILY LIVING (ADL)
GROCERY_SHOPPING: INDEPENDENT
DOING_HOUSEWORK: NEEDS ASSISTANCE
DRESSING: INDEPENDENT
MANAGING_FINANCES: INDEPENDENT
TAKING_MEDICATION: INDEPENDENT
BATHING: INDEPENDENT

## 2023-04-14 NOTE — PROGRESS NOTES
Subjective   Reason for Visit: Vanessa Son is a 73 y.o. female here for a Medicare Wellness visit.   Covid vax: x 2  CRC: 2019  Mammogram: 6/2017  Pap: hysterectomy  Lmp: hysterectomy  Hba1c 7  Sugars<200  Ldl 104    CHECKLIST REVIEWED AND COMPLETE FOR AMW  Reminded her eye exam needed q2y minimum had last summer      Past Medical, Surgical, and Family History reviewed and updated in chart.    Reviewed all medications by prescribing practitioner or clinical pharmacist (such as prescriptions, OTCs, herbal therapies and supplements) and documented in the medical record.  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 (CMS/[facility])    Essential hypertension    Gout    Hyperuricemia    Hematuria    Lichen sclerosus    Primary osteoarthritis of right knee    Status post total knee replacement    Class 2 obesity with alveolar hypoventilation, serious comorbidity, and body mass index (BMI) of 36.0 to 36.9 in adult (CMS/[facility])    Class 3 severe obesity due to excess calories with serious comorbidity and body mass index (BMI) of 40.0 to 44.9 in adult (CMS/[facility])    Encounter for screening mammogram for breast cancer    Need for vaccination    Type 2 diabetes mellitus with hyperglycemia, with long-term current use of insulin (CMS/[facility])    Mixed hyperlipidemia      Past Surgical History:   Procedure Laterality Date    APPENDECTOMY  1963    CATARACT EXTRACTION  2018     "CHOLECYSTECTOMY  1974    COLONOSCOPY  2019    Incomplete so Barium Enema + tics only    HYSTERECTOMY  1991    uterine CA limited no rad tx nor chemo    TONSILLECTOMY  1991    TOTAL KNEE ARTHROPLASTY Right 10/2021    TUBAL LIGATION  1977       Review of Systems  This patient has   NO history of recent Covid nor flu symptoms,  NO Fever nor chills,  NO Chest pain, shortness of breath nor paroxysmal nocturnal dyspnea,  NO Nausea, vomiting, nor diarrhea,  NO Hematochezia nor melena,  NO Dysuria, hematuria, nor new incontinence issues  NO new severe headaches nor neurological complaints,  NO new issues with anxiety nor depression nor new psychiatric complaints,  NO suicidal nor homicidal ideations.     OBJECTIVE:  Ht 1.651 m (5' 5\")   Wt 108 kg (238 lb)   LMP  (LMP Unknown)   BMI 39.61 kg/m²      General:  alert, oriented, no acute distress.  No obvious skin rashes noted.   No gait disturbance noted.    Mood is pleasant, not tearful, no signs of emotional distress.  Not appearing intoxicated or altered.   No voiced delusions,   Normal, appropriate behavior.    HEENT: Normocephalic, atraumatic,   Pupils round, reactive to light  Extraocular motions intact and wnl  Tympanic membranes normal    Neck: no nuchal rigidity  No masses palpable.  No carotid bruits.  No thyromegaly.    Respiratory: Equal breath sounds  No wheezes,    rales,    nor rhonchi  No respiratory distress.    Heart: Regular rate and rhythm, no    murmurs  no rubs/gallops    Abdomen: no masses palpable, no rebound nor guarding, no rebound nor guarding.overwt    Extremities: NO cyanosis noted, no clubbing.   No edema noted.  2+dorsalis pedis pulses.    Normal-not antalgic, steady gait.  Foot exam wnl  Orders Only on 04/04/2023   Component Date Value Ref Range Status    Hemoglobin A1C 04/04/2023 7.0 (A)  % Final    Comment:      Diagnosis of Diabetes-Adults   Non-Diabetic: < or = 5.6%   Increased risk for developing diabetes: 5.7-6.4%   Diagnostic of " diabetes: > or = 6.5%  .       Monitoring of Diabetes                Age (y)     Therapeutic Goal (%)   Adults:          >18           <7.0   Pediatrics:    13-18           <7.5                   7-12           <8.0                   0- 6            7.5-8.5   American Diabetes Association. Diabetes Care 33(S1), Jan 2010.      Estimated Average Glucose 04/04/2023 154  MG/DL Final   Orders Only on 04/04/2023   Component Date Value Ref Range Status    Glucose 04/04/2023 125 (H)  74 - 99 mg/dL Final    Sodium 04/04/2023 139  136 - 145 mmol/L Final    Potassium 04/04/2023 3.6  3.5 - 5.3 mmol/L Final    Chloride 04/04/2023 100  98 - 107 mmol/L Final    Bicarbonate 04/04/2023 30  21 - 32 mmol/L Final    Anion Gap 04/04/2023 13  10 - 20 mmol/L Final    Urea Nitrogen 04/04/2023 21  6 - 23 mg/dL Final    Creatinine 04/04/2023 0.86  0.50 - 1.05 mg/dL Final    GFR Female 04/04/2023 72  >90 mL/min/1.73m2 Final    Comment:  CALCULATIONS OF ESTIMATED GFR ARE PERFORMED   USING THE 2021 CKD-EPI STUDY REFIT EQUATION   WITHOUT THE RACE VARIABLE FOR THE IDMS-TRACEABLE   CREATININE METHODS.    https://jasn.asnjournals.org/content/early/2021/09/22/ASN.4315627689      Calcium 04/04/2023 9.7  8.6 - 10.3 mg/dL Final    Albumin 04/04/2023 3.9  3.4 - 5.0 g/dL Final    Alkaline Phosphatase 04/04/2023 48  33 - 136 U/L Final    Total Protein 04/04/2023 6.9  6.4 - 8.2 g/dL Final    AST 04/04/2023 24  9 - 39 U/L Final    Total Bilirubin 04/04/2023 1.0  0.0 - 1.2 mg/dL Final    ALT (SGPT) 04/04/2023 24  7 - 45 U/L Final    Comment:  Patients treated with Sulfasalazine may generate    falsely decreased results for ALT.          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     Patient has capacity to make their own medical decisions: Yes  Health Care Agent/Surrogate Decision Maker documented in chart: Yes  Documents on file and valid:  Advance Directive/Living Will: Yes   Health Care Power of : Yes  Communication of Medical Status/Prognosis:   yes   Communication of Treatment Goals/Options:   yes  Treatment Decisions  yes  Time Statement: Total face to face time spent on advance care planning was <30 minutes with <30 minutes spent in counseling, including the explanation.    SEE ME AT NEXT REGULARLY SCHEDULED VISIT-sooner if condition deteriorates or new problems arise.  Full code desired  No dementia  No depression  And again had a lengthy discussion w pt  about risks of poorly controlled diabetes including micro and macrovascular complications of DM2 including blindness,MI,CVA and death among other possibilities. Pt aware and agrees to better compliance and adherance to instructions such as regular eye exams q 1-2 y, foot exams,and f/u regularly for hba1c with a goal of 6.5.  4mo hba1c cmp    NO evidence of neuropathy or nephropathy at this point    Follow up as planned for hba1c and BP checks REGULARLY.

## 2023-07-28 PROBLEM — E66.2 CLASS 2 OBESITY WITH ALVEOLAR HYPOVENTILATION, SERIOUS COMORBIDITY, AND BODY MASS INDEX (BMI) OF 36.0 TO 36.9 IN ADULT (MULTI): Status: RESOLVED | Noted: 2023-03-02 | Resolved: 2023-07-28

## 2023-07-28 PROBLEM — E66.812 CLASS 2 OBESITY WITH ALVEOLAR HYPOVENTILATION, SERIOUS COMORBIDITY, AND BODY MASS INDEX (BMI) OF 36.0 TO 36.9 IN ADULT: Status: RESOLVED | Noted: 2023-03-02 | Resolved: 2023-07-28

## 2023-08-01 DIAGNOSIS — I10 ESSENTIAL HYPERTENSION: ICD-10-CM

## 2023-08-01 RX ORDER — LOSARTAN POTASSIUM AND HYDROCHLOROTHIAZIDE 12.5; 5 MG/1; MG/1
1 TABLET ORAL DAILY
Qty: 90 TABLET | Refills: 0 | Status: SHIPPED | OUTPATIENT
Start: 2023-08-01 | End: 2023-10-09

## 2023-08-08 ENCOUNTER — TELEPHONE (OUTPATIENT)
Dept: PRIMARY CARE | Facility: CLINIC | Age: 73
End: 2023-08-08
Payer: MEDICARE

## 2023-08-08 DIAGNOSIS — E11.22 CKD STAGE 1 DUE TO TYPE 2 DIABETES MELLITUS (MULTI): ICD-10-CM

## 2023-08-08 DIAGNOSIS — M10.9 GOUT, UNSPECIFIED CAUSE, UNSPECIFIED CHRONICITY, UNSPECIFIED SITE: ICD-10-CM

## 2023-08-08 DIAGNOSIS — I10 ESSENTIAL HYPERTENSION: ICD-10-CM

## 2023-08-08 DIAGNOSIS — E79.0 HYPERURICEMIA: ICD-10-CM

## 2023-08-08 DIAGNOSIS — E11.65 TYPE 2 DIABETES MELLITUS WITH HYPERGLYCEMIA, WITH LONG-TERM CURRENT USE OF INSULIN (MULTI): ICD-10-CM

## 2023-08-08 DIAGNOSIS — Z79.4 TYPE 2 DIABETES MELLITUS WITH HYPERGLYCEMIA, WITH LONG-TERM CURRENT USE OF INSULIN (MULTI): ICD-10-CM

## 2023-08-08 DIAGNOSIS — N18.1 CKD STAGE 1 DUE TO TYPE 2 DIABETES MELLITUS (MULTI): ICD-10-CM

## 2023-08-08 DIAGNOSIS — E78.2 MIXED HYPERLIPIDEMIA: ICD-10-CM

## 2023-08-08 NOTE — TELEPHONE ENCOUNTER
patient NOT happy labs were not in system when arriving to [place] this morning as she states she has been fasting ... if labs could be placed in system that would be great ....

## 2023-08-09 ENCOUNTER — LAB (OUTPATIENT)
Dept: LAB | Facility: LAB | Age: 73
End: 2023-08-09
Payer: MEDICARE

## 2023-08-09 DIAGNOSIS — I10 ESSENTIAL HYPERTENSION: ICD-10-CM

## 2023-08-09 DIAGNOSIS — E78.2 MIXED HYPERLIPIDEMIA: ICD-10-CM

## 2023-08-09 DIAGNOSIS — E79.0 HYPERURICEMIA: ICD-10-CM

## 2023-08-09 DIAGNOSIS — E11.22 CKD STAGE 1 DUE TO TYPE 2 DIABETES MELLITUS (MULTI): Primary | ICD-10-CM

## 2023-08-09 DIAGNOSIS — M10.9 GOUT, UNSPECIFIED CAUSE, UNSPECIFIED CHRONICITY, UNSPECIFIED SITE: ICD-10-CM

## 2023-08-09 DIAGNOSIS — E11.22 CKD STAGE 1 DUE TO TYPE 2 DIABETES MELLITUS (MULTI): ICD-10-CM

## 2023-08-09 DIAGNOSIS — Z79.4 TYPE 2 DIABETES MELLITUS WITH HYPERGLYCEMIA, WITH LONG-TERM CURRENT USE OF INSULIN (MULTI): ICD-10-CM

## 2023-08-09 DIAGNOSIS — N18.1 CKD STAGE 1 DUE TO TYPE 2 DIABETES MELLITUS (MULTI): ICD-10-CM

## 2023-08-09 DIAGNOSIS — E11.65 TYPE 2 DIABETES MELLITUS WITH HYPERGLYCEMIA, WITH LONG-TERM CURRENT USE OF INSULIN (MULTI): ICD-10-CM

## 2023-08-09 DIAGNOSIS — N18.1 CKD STAGE 1 DUE TO TYPE 2 DIABETES MELLITUS (MULTI): Primary | ICD-10-CM

## 2023-08-09 LAB
ALANINE AMINOTRANSFERASE (SGPT) (U/L) IN SER/PLAS: 23 U/L (ref 7–45)
ALBUMIN (G/DL) IN SER/PLAS: 4 G/DL (ref 3.4–5)
ALBUMIN (MG/L) IN URINE: 262.1 MG/L
ALBUMIN/CREATININE (UG/MG) IN URINE: 192.7 UG/MG CRT (ref 0–30)
ALKALINE PHOSPHATASE (U/L) IN SER/PLAS: 57 U/L (ref 33–136)
ANION GAP IN SER/PLAS: 13 MMOL/L (ref 10–20)
ASPARTATE AMINOTRANSFERASE (SGOT) (U/L) IN SER/PLAS: 22 U/L (ref 9–39)
BILIRUBIN TOTAL (MG/DL) IN SER/PLAS: 0.9 MG/DL (ref 0–1.2)
CALCIUM (MG/DL) IN SER/PLAS: 10 MG/DL (ref 8.6–10.3)
CARBON DIOXIDE, TOTAL (MMOL/L) IN SER/PLAS: 31 MMOL/L (ref 21–32)
CHLORIDE (MMOL/L) IN SER/PLAS: 101 MMOL/L (ref 98–107)
CHOLESTEROL (MG/DL) IN SER/PLAS: 200 MG/DL (ref 0–199)
CHOLESTEROL IN HDL (MG/DL) IN SER/PLAS: 43.6 MG/DL
CHOLESTEROL/HDL RATIO: 4.6
CREATININE (MG/DL) IN SER/PLAS: 0.8 MG/DL (ref 0.5–1.05)
CREATININE (MG/DL) IN URINE: 136 MG/DL (ref 20–320)
ERYTHROCYTE DISTRIBUTION WIDTH (RATIO) BY AUTOMATED COUNT: 13.7 % (ref 11.5–14.5)
ERYTHROCYTE MEAN CORPUSCULAR HEMOGLOBIN CONCENTRATION (G/DL) BY AUTOMATED: 34.4 G/DL (ref 32–36)
ERYTHROCYTE MEAN CORPUSCULAR VOLUME (FL) BY AUTOMATED COUNT: 89 FL (ref 80–100)
ERYTHROCYTES (10*6/UL) IN BLOOD BY AUTOMATED COUNT: 4.43 X10E12/L (ref 4–5.2)
ESTIMATED AVERAGE GLUCOSE FOR HBA1C: 163 MG/DL
GFR FEMALE: 78 ML/MIN/1.73M2
GLUCOSE (MG/DL) IN SER/PLAS: 136 MG/DL (ref 74–99)
HEMATOCRIT (%) IN BLOOD BY AUTOMATED COUNT: 39.5 % (ref 36–46)
HEMOGLOBIN (G/DL) IN BLOOD: 13.6 G/DL (ref 12–16)
HEMOGLOBIN A1C/HEMOGLOBIN TOTAL IN BLOOD: 7.3 %
LDL: 110 MG/DL (ref 0–99)
LEUKOCYTES (10*3/UL) IN BLOOD BY AUTOMATED COUNT: 5.9 X10E9/L (ref 4.4–11.3)
NON HDL CHOLESTEROL: 156 MG/DL
PLATELETS (10*3/UL) IN BLOOD AUTOMATED COUNT: 194 X10E9/L (ref 150–450)
POTASSIUM (MMOL/L) IN SER/PLAS: 4 MMOL/L (ref 3.5–5.3)
PROTEIN TOTAL: 7.2 G/DL (ref 6.4–8.2)
SODIUM (MMOL/L) IN SER/PLAS: 141 MMOL/L (ref 136–145)
TRIGLYCERIDE (MG/DL) IN SER/PLAS: 233 MG/DL (ref 0–149)
URATE (MG/DL) IN SER/PLAS: 8.4 MG/DL (ref 2.3–6.7)
UREA NITROGEN (MG/DL) IN SER/PLAS: 22 MG/DL (ref 6–23)
VLDL: 47 MG/DL (ref 0–40)

## 2023-08-09 PROCEDURE — 36415 COLL VENOUS BLD VENIPUNCTURE: CPT

## 2023-08-09 PROCEDURE — 82043 UR ALBUMIN QUANTITATIVE: CPT

## 2023-08-09 PROCEDURE — 85027 COMPLETE CBC AUTOMATED: CPT

## 2023-08-09 PROCEDURE — 80053 COMPREHEN METABOLIC PANEL: CPT

## 2023-08-09 PROCEDURE — 84550 ASSAY OF BLOOD/URIC ACID: CPT

## 2023-08-09 PROCEDURE — 80061 LIPID PANEL: CPT

## 2023-08-09 PROCEDURE — 83036 HEMOGLOBIN GLYCOSYLATED A1C: CPT

## 2023-08-09 PROCEDURE — 82570 ASSAY OF URINE CREATININE: CPT

## 2023-08-10 RX ORDER — INSULIN GLARGINE 100 [IU]/ML
INJECTION, SOLUTION SUBCUTANEOUS
Qty: 90 ML | Refills: 3 | Status: SHIPPED | OUTPATIENT
Start: 2023-08-10

## 2023-08-10 NOTE — PROGRESS NOTES
C19: [id number]  Pneumo: eligible PPSV23  Pap: Hysterectomy  Mamm: due -Pt DECLINES  ColoRect: 2019

## 2023-08-12 ASSESSMENT — ENCOUNTER SYMPTOMS
SWEATS: 0
WEAKNESS: 1
VISUAL CHANGE: 0
BLURRED VISION: 0
POLYDIPSIA: 0
SEIZURES: 0
HUNGER: 0
WEIGHT LOSS: 0
DIZZINESS: 0
HEADACHES: 0
FATIGUE: 0
POLYPHAGIA: 0
BLACKOUTS: 0
NERVOUS/ANXIOUS: 0
CONFUSION: 0
TREMORS: 0
SPEECH DIFFICULTY: 0

## 2023-08-14 ENCOUNTER — OFFICE VISIT (OUTPATIENT)
Dept: PRIMARY CARE | Facility: CLINIC | Age: 73
End: 2023-08-14
Payer: MEDICARE

## 2023-08-14 VITALS
DIASTOLIC BLOOD PRESSURE: 60 MMHG | HEIGHT: 65 IN | TEMPERATURE: 97.6 F | SYSTOLIC BLOOD PRESSURE: 122 MMHG | BODY MASS INDEX: 40.32 KG/M2 | RESPIRATION RATE: 16 BRPM | HEART RATE: 72 BPM | WEIGHT: 242 LBS | OXYGEN SATURATION: 99 %

## 2023-08-14 DIAGNOSIS — E11.65 TYPE 2 DIABETES MELLITUS WITH HYPERGLYCEMIA, WITH LONG-TERM CURRENT USE OF INSULIN (MULTI): ICD-10-CM

## 2023-08-14 DIAGNOSIS — E66.01 CLASS 3 SEVERE OBESITY DUE TO EXCESS CALORIES WITH SERIOUS COMORBIDITY AND BODY MASS INDEX (BMI) OF 40.0 TO 44.9 IN ADULT (MULTI): ICD-10-CM

## 2023-08-14 DIAGNOSIS — I10 ESSENTIAL HYPERTENSION: ICD-10-CM

## 2023-08-14 DIAGNOSIS — E78.2 MIXED HYPERLIPIDEMIA: ICD-10-CM

## 2023-08-14 DIAGNOSIS — Z23 NEED FOR VACCINATION: Primary | ICD-10-CM

## 2023-08-14 DIAGNOSIS — L90.0 LICHEN SCLEROSUS: ICD-10-CM

## 2023-08-14 DIAGNOSIS — Z79.4 TYPE 2 DIABETES MELLITUS WITH HYPERGLYCEMIA, WITH LONG-TERM CURRENT USE OF INSULIN (MULTI): ICD-10-CM

## 2023-08-14 PROCEDURE — 99214 OFFICE O/P EST MOD 30 MIN: CPT | Performed by: FAMILY MEDICINE

## 2023-08-14 PROCEDURE — 1160F RVW MEDS BY RX/DR IN RCRD: CPT | Performed by: FAMILY MEDICINE

## 2023-08-14 PROCEDURE — 1036F TOBACCO NON-USER: CPT | Performed by: FAMILY MEDICINE

## 2023-08-14 PROCEDURE — 1159F MED LIST DOCD IN RCRD: CPT | Performed by: FAMILY MEDICINE

## 2023-08-14 PROCEDURE — 90732 PPSV23 VACC 2 YRS+ SUBQ/IM: CPT | Performed by: FAMILY MEDICINE

## 2023-08-14 PROCEDURE — 3066F NEPHROPATHY DOC TX: CPT | Performed by: FAMILY MEDICINE

## 2023-08-14 PROCEDURE — 3074F SYST BP LT 130 MM HG: CPT | Performed by: FAMILY MEDICINE

## 2023-08-14 PROCEDURE — 3078F DIAST BP <80 MM HG: CPT | Performed by: FAMILY MEDICINE

## 2023-08-14 PROCEDURE — 3008F BODY MASS INDEX DOCD: CPT | Performed by: FAMILY MEDICINE

## 2023-08-14 PROCEDURE — G0009 ADMIN PNEUMOCOCCAL VACCINE: HCPCS | Performed by: FAMILY MEDICINE

## 2023-08-14 PROCEDURE — 3051F HG A1C>EQUAL 7.0%<8.0%: CPT | Performed by: FAMILY MEDICINE

## 2023-08-14 RX ORDER — HYDROCORTISONE 1 %
CREAM (GRAM) TOPICAL 2 TIMES DAILY
Qty: 90 G | Refills: 3 | Status: SHIPPED | OUTPATIENT
Start: 2023-08-14

## 2023-08-14 RX ORDER — AMLODIPINE BESYLATE 5 MG/1
5 TABLET ORAL DAILY
Qty: 90 TABLET | Refills: 3 | Status: SHIPPED | OUTPATIENT
Start: 2023-08-14 | End: 2023-08-14 | Stop reason: SDUPTHER

## 2023-08-14 RX ORDER — METOPROLOL TARTRATE 100 MG/1
100 TABLET ORAL 2 TIMES DAILY
Qty: 180 TABLET | Refills: 3 | Status: SHIPPED | OUTPATIENT
Start: 2023-08-14 | End: 2023-08-14 | Stop reason: SDUPTHER

## 2023-08-14 RX ORDER — INDAPAMIDE 1.25 MG/1
1.25 TABLET ORAL DAILY
Qty: 90 TABLET | Refills: 3 | Status: SHIPPED | OUTPATIENT
Start: 2023-08-14 | End: 2023-08-14 | Stop reason: SDUPTHER

## 2023-08-14 NOTE — PROGRESS NOTES
Chief Complaint   Patient presents with    Diabetes    Hypertension     Has not been taking losartan for about 3mo     C19: [id number]  Pneumo: eligible PPSV23  Pap: Hysterectomy  Mamm: due -Pt DECLINES  ColoRect: 2019  STATIN INTOLERANT    Vanessa Son is here for a diabetic follow up    AVH5M=5.3    ACF=762    Sugars most recently have been running-   HIGH & LOW <150   Activity level-     advised to increase  The patient denies history of       seizures,             heart attack or KNOWN CAD       or stroke.     No chest pain, shortness of breath, paroxysmal nocturnal dyspnea.     No nausea, vomiting, diarrhea, hematochezia or melena.      No paresthesias or headaches      No dysuria, frequency or hematuria.    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 (CMS/[facility])    Essential hypertension    Gout    Hyperuricemia    Hematuria    Lichen sclerosus    Primary osteoarthritis of right knee    Status post total knee replacement    Class 3 severe obesity due to excess calories with serious comorbidity and body mass index (BMI) of 40.0 to 44.9 in adult (CMS/[facility])    Encounter for screening mammogram for breast cancer    Need for vaccination    Type 2 diabetes mellitus with hyperglycemia, with long-term current use of insulin (CMS/[facility])    Mixed hyperlipidemia     Past Surgical History:   Procedure Laterality Date    APPENDECTOMY  1963    CATARACT EXTRACTION  2018    CHOLECYSTECTOMY  1974    COLONOSCOPY  2019    Incomplete so Barium Enema + tics only    HYSTERECTOMY  1991    uterine CA limited no rad tx nor chemo    TONSILLECTOMY  1991    TOTAL KNEE ARTHROPLASTY Right 10/2021    TUBAL LIGATION  1977     Social History     Socioeconomic History    Marital status:      Spouse name: None    Number of children: None    Years of education: None    Highest education level: None   Occupational History    None   Tobacco Use    Smoking status: Never     Smokeless tobacco: Never   Substance and Sexual Activity    Alcohol use: Never    Drug use: Never    Sexual activity: None   Other Topics Concern    None   Social History Narrative    None     Social Determinants of Health     Financial Resource Strain: Not on file   Food Insecurity: Not on file   Transportation Needs: Not on file   Physical Activity: Not on file   Stress: Not on file   Social Connections: Not on file   Intimate Partner Violence: Not on file   Housing Stability: Not on file     Lab on 08/09/2023   Component Date Value Ref Range Status    Glucose 08/09/2023 136 (H)  74 - 99 mg/dL Final    Sodium 08/09/2023 141  136 - 145 mmol/L Final    Potassium 08/09/2023 4.0  3.5 - 5.3 mmol/L Final    Chloride 08/09/2023 101  98 - 107 mmol/L Final    Bicarbonate 08/09/2023 31  21 - 32 mmol/L Final    Anion Gap 08/09/2023 13  10 - 20 mmol/L Final    Urea Nitrogen 08/09/2023 22  6 - 23 mg/dL Final    Creatinine 08/09/2023 0.80  0.50 - 1.05 mg/dL Final    GFR Female 08/09/2023 78  >90 mL/min/1.73m2 Final     CALCULATIONS OF ESTIMATED GFR ARE PERFORMED   USING THE 2021 CKD-EPI STUDY REFIT EQUATION   WITHOUT THE RACE VARIABLE FOR THE IDMS-TRACEABLE   CREATININE METHODS.    https://jasn.asnjournals.org/content/early/2021/09/22/ASN.6488330926    Calcium 08/09/2023 10.0  8.6 - 10.3 mg/dL Final    Albumin 08/09/2023 4.0  3.4 - 5.0 g/dL Final    Alkaline Phosphatase 08/09/2023 57  33 - 136 U/L Final    Total Protein 08/09/2023 7.2  6.4 - 8.2 g/dL Final    AST 08/09/2023 22  9 - 39 U/L Final    Total Bilirubin 08/09/2023 0.9  0.0 - 1.2 mg/dL Final    ALT (SGPT) 08/09/2023 23  7 - 45 U/L Final     Patients treated with Sulfasalazine may generate    falsely decreased results for ALT.    Cholesterol 08/09/2023 200 (H)  0 - 199 mg/dL Final    .      AGE      DESIRABLE   BORDERLINE HIGH   HIGH     0-19 Y     0 - 169       170 - 199     >/= 200    20-24 Y     0 - 189       190 - 224     >/= 225         >24 Y     0 - 199       200  - 239     >/= 240   **All ranges are based on fasting samples. Specific   therapeutic targets will vary based on patient-specific   cardiac risk.  .   Pediatric guidelines reference:Pediatrics 2011, 128(S5).   Adult guidelines reference: NCEP ATPIII Guidelines,     ASHLEIGH 2001, 258:2486-97  .   Venipuncture immediately after or during the    administration of Metamizole may lead to falsely   low results. Testing should be performed immediately   prior to Metamizole dosing.    HDL 08/09/2023 43.6  mg/dL Final    .      AGE      VERY LOW   LOW     NORMAL    HIGH       0-19 Y       < 35   < 40     40-45     ----    20-24 Y       ----   < 40       >45     ----      >24 Y       ----   < 40     40-60      >60  .    Cholesterol/HDL Ratio 08/09/2023 4.6   Final    REF VALUES  DESIRABLE  < 3.4  HIGH RISK  > 5.0    LDL 08/09/2023 110 (H)  0 - 99 mg/dL Final    .                           NEAR      BORD      AGE      DESIRABLE  OPTIMAL    HIGH     HIGH     VERY HIGH     0-19 Y     0 - 109     ---    110-129   >/= 130     ----    20-24 Y     0 - 119     ---    120-159   >/= 160     ----      >24 Y     0 -  99   100-129  130-159   160-189     >/=190  .    VLDL 08/09/2023 47 (H)  0 - 40 mg/dL Final    Triglycerides 08/09/2023 233 (H)  0 - 149 mg/dL Final    .      AGE      DESIRABLE   BORDERLINE HIGH   HIGH     VERY HIGH   0 D-90 D    19 - 174         ----         ----        ----  91 D- 9 Y     0 -  74        75 -  99     >/= 100      ----    10-19 Y     0 -  89        90 - 129     >/= 130      ----    20-24 Y     0 - 114       115 - 149     >/= 150      ----         >24 Y     0 - 149       150 - 199    200- 499    >/= 500  .   Venipuncture immediately after or during the    administration of Metamizole may lead to falsely   low results. Testing should be performed immediately   prior to Metamizole dosing.    Non HDL Cholesterol 08/09/2023 156  mg/dL Final        AGE      DESIRABLE   BORDERLINE HIGH   HIGH     VERY HIGH     0-19 Y      0 - 119       120 - 144     >/= 145    >/= 160    20-24 Y     0 - 149       150 - 189     >/= 190      ----         >24 Y    30 MG/DL ABOVE LDL CHOLESTEROL GOAL  .    WBC 08/09/2023 5.9  4.4 - 11.3 x10E9/L Final    RBC 08/09/2023 4.43  4.00 - 5.20 x10E12/L Final    Hemoglobin 08/09/2023 13.6  12.0 - 16.0 g/dL Final    Hematocrit 08/09/2023 39.5  36.0 - 46.0 % Final    MCV 08/09/2023 89  80 - 100 fL Final    MCHC 08/09/2023 34.4  32.0 - 36.0 g/dL Final    Platelets 08/09/2023 194  150 - 450 x10E9/L Final    RDW 08/09/2023 13.7  11.5 - 14.5 % Final    Hemoglobin A1C 08/09/2023 7.3 (A)  % Final         Diagnosis of Diabetes-Adults   Non-Diabetic: < or = 5.6%   Increased risk for developing diabetes: 5.7-6.4%   Diagnostic of diabetes: > or = 6.5%  .       Monitoring of Diabetes                Age (y)     Therapeutic Goal (%)   Adults:          >18           <7.0   Pediatrics:    13-18           <7.5                   7-12           <8.0                   0- 6            7.5-8.5   American Diabetes Association. Diabetes Care 33(S1), Jan 2010.    Estimated Average Glucose 08/09/2023 163  MG/DL Final    Uric Acid 08/09/2023 8.4 (H)  2.3 - 6.7 mg/dL Final     Venipuncture immediately after or during the    administration of Metamizole may lead to falsely   low results. Testing should be performed immediately   prior to Metamizole dosing.    ALBUMIN (MG/L) IN URINE 08/09/2023 262.1  Not Established mg/L Final    Albumin/Creatine Ratio 08/09/2023 192.7 (H)  0.0 - 30.0 ug/mg crt Final    Creatinine, Urine 08/09/2023 136.0  20.0 - 320.0 mg/dL Final        Health Maintenance   Topic Date Due    Mammogram  Never done    Influenza Vaccine (1) 09/01/2023    Diabetes: Hemoglobin A1C  11/09/2023    Diabetes: Foot Exam  04/14/2024    Diabetes: Retinopathy Screening  04/14/2024    Medicare Annual Wellness Visit (AWV)  04/15/2024    Lipid Panel  08/09/2024    Colorectal Cancer Screening  03/18/2029    DTaP/Tdap/Td Vaccines (2 - Td  or Tdap) 10/28/2029    Pneumococcal Vaccine: 65+ Years  Completed    HIB Vaccines  Aged Out    Hepatitis B Vaccines  Aged Out    IPV Vaccines  Aged Out    Hepatitis A Vaccines  Aged Out    Meningococcal Vaccine  Aged Out    Rotavirus Vaccines  Aged Out    HPV Vaccines  Aged Out    Zoster Vaccines  Discontinued    Hepatitis C Screening  Discontinued    Bone Density Scan  Discontinued    COVID-19 Vaccine  Discontinued    Irritable Bowel Syndrome  Discontinued         Wt Readings from Last 3 Encounters:   08/14/23 110 kg (242 lb)   04/14/23 108 kg (238 lb)   12/14/22 107 kg (235 lb)       Temp Readings from Last 3 Encounters:   08/14/23 36.4 °C (97.6 °F) (Temporal)   04/14/23 36.4 °C (97.5 °F) (Temporal)   12/14/22 36.3 °C (97.4 °F)     BP Readings from Last 3 Encounters:   08/14/23 122/60   04/14/23 120/64   12/14/22 138/70     Pulse Readings from Last 3 Encounters:   08/14/23 72   04/14/23 76   12/14/22 83     PHYSICAL EXAMINATION:      -----------------------------------------------------------------------------------------------------  GENERAL:  The patient appears nourished     &  normal affect.      No acute distress.     Alert and oriented times 3.     No obvious skin rashes or lesions.    No gait disturbance.      HEENT:   Normocephalic, atraumatic.    Normal speech    Extraocular eye motions intact and pain free.                 Pupils reactive and equally round. Conjunctivae clear    TMs normal    No erythema pharynx      NECK:  No masses or adenopathy palpable.  No asymmetry visible.     No thyromegaly.    No bruits.       RESPIRATORY:  Equal breath sounds / no acute respiratory distress.      No wheezes,rales, or rhonchi        HEART:  Regular rhythm without murmur, rub or gallop.       ABDOMEN:  Soft, nontender.   No masses, guarding or rebound.     Normoactive bowel sounds overwt.       EXTREMITIES:   No edema in any extremity.           No cyanosis or clubbing.      2+ dorsalis pedis pulses  bilaterally        FOOT EXAM:    A comprehensive foot exam was performed including monofilament testing or equivalent for sensation.    Normal foot, ankle, and digit range of motion and strength.  Patient was found to have intact sensation, 2+ dorsalis pedis pulses and no concerning calluses, rashes, sores or foot lesions of any kind.   No obvious neuropathy.    1. Essential hypertension  amLODIPine (Norvasc) 5 mg tablet    metoprolol tartrate (Lopressor) 100 mg tablet    indapamide (Lozol) 1.25 mg tablet      2. Lichen sclerosus  hydrocortisone 1 % cream      3. Type 2 diabetes mellitus with hyperglycemia, with long-term current use of insulin (CMS/[facility])        4. Mixed hyperlipidemia        5. Class 3 severe obesity due to excess calories with serious comorbidity and body mass index (BMI) of 40.0 to 44.9 in adult (CMS/[facility])          Hba1c ACCEPTABLE  WILL CALL IF DESIRES MAMMOGRAM AWARE OF RISKS IF DECLINED  HBA1C IN 3-4MO HERE  Resume hyzaar at 1/2 pill  Cont low purine diet  Add cherries    And again had a lengthy discussion w pt  about risks of poorly controlled diabetes including micro and macrovascular complications of DM2 including blindness,MI,CVA and death among other possibilities. Pt aware and agrees to better compliance and adherance to instructions such as regular eye exams q 1-2 y, foot exams,and f/u regularly for hba1c with a goal of 6.5.    NO evidence of neuropathy or nephropathy at this point    Follow up as planned for hba1c and BP checks REGULARLY.    Sooner if condition deteriorates or problems arise    Lalitha Ramirez MD

## 2023-08-18 DIAGNOSIS — M10.9 GOUT, UNSPECIFIED CAUSE, UNSPECIFIED CHRONICITY, UNSPECIFIED SITE: ICD-10-CM

## 2023-08-18 RX ORDER — INDAPAMIDE 1.25 MG/1
1.25 TABLET ORAL DAILY
Qty: 90 TABLET | Refills: 3 | Status: SHIPPED | OUTPATIENT
Start: 2023-08-18

## 2023-08-18 RX ORDER — METOPROLOL TARTRATE 100 MG/1
100 TABLET ORAL 2 TIMES DAILY
Qty: 180 TABLET | Refills: 3 | Status: SHIPPED | OUTPATIENT
Start: 2023-08-18

## 2023-08-18 RX ORDER — AMLODIPINE BESYLATE 5 MG/1
5 TABLET ORAL DAILY
Qty: 90 TABLET | Refills: 3 | Status: SHIPPED | OUTPATIENT
Start: 2023-08-18

## 2023-08-21 RX ORDER — COLCHICINE 0.6 MG/1
0.6 TABLET ORAL DAILY PRN
Qty: 90 TABLET | Refills: 3 | Status: SHIPPED | OUTPATIENT
Start: 2023-08-21

## 2023-10-07 DIAGNOSIS — I10 ESSENTIAL HYPERTENSION: ICD-10-CM

## 2023-10-09 RX ORDER — LOSARTAN POTASSIUM AND HYDROCHLOROTHIAZIDE 12.5; 5 MG/1; MG/1
0.5 TABLET ORAL DAILY
Qty: 45 TABLET | Refills: 0 | Status: SHIPPED | OUTPATIENT
Start: 2023-10-09 | End: 2024-04-02 | Stop reason: SDUPTHER

## 2023-10-13 DIAGNOSIS — E11.65 TYPE 2 DIABETES MELLITUS WITH HYPERGLYCEMIA, WITH LONG-TERM CURRENT USE OF INSULIN (MULTI): ICD-10-CM

## 2023-10-13 DIAGNOSIS — Z79.4 TYPE 2 DIABETES MELLITUS WITH HYPERGLYCEMIA, WITH LONG-TERM CURRENT USE OF INSULIN (MULTI): ICD-10-CM

## 2023-10-13 RX ORDER — INSULIN LISPRO 100 [IU]/ML
12 INJECTION, SOLUTION INTRAVENOUS; SUBCUTANEOUS
Qty: 45 ML | Refills: 3 | Status: SHIPPED | OUTPATIENT
Start: 2023-10-13

## 2023-11-04 DIAGNOSIS — Z79.4 TYPE 2 DIABETES MELLITUS WITH HYPERGLYCEMIA, WITH LONG-TERM CURRENT USE OF INSULIN (MULTI): ICD-10-CM

## 2023-11-04 DIAGNOSIS — E11.65 TYPE 2 DIABETES MELLITUS WITH HYPERGLYCEMIA, WITH LONG-TERM CURRENT USE OF INSULIN (MULTI): ICD-10-CM

## 2023-11-06 RX ORDER — LIRAGLUTIDE 6 MG/ML
1.8 INJECTION SUBCUTANEOUS DAILY
Qty: 27 ML | Refills: 3 | Status: SHIPPED | OUTPATIENT
Start: 2023-11-06

## 2023-11-06 RX ORDER — BLOOD SUGAR DIAGNOSTIC
STRIP MISCELLANEOUS
Qty: 300 STRIP | Refills: 3 | Status: SHIPPED | OUTPATIENT
Start: 2023-11-06

## 2023-12-07 ENCOUNTER — TELEPHONE (OUTPATIENT)
Dept: PRIMARY CARE | Facility: CLINIC | Age: 73
End: 2023-12-07
Payer: MEDICARE

## 2023-12-07 DIAGNOSIS — M10.9 GOUT, UNSPECIFIED CAUSE, UNSPECIFIED CHRONICITY, UNSPECIFIED SITE: ICD-10-CM

## 2023-12-07 DIAGNOSIS — Z79.4 TYPE 2 DIABETES MELLITUS WITH HYPERGLYCEMIA, WITH LONG-TERM CURRENT USE OF INSULIN (MULTI): ICD-10-CM

## 2023-12-07 DIAGNOSIS — E11.65 TYPE 2 DIABETES MELLITUS WITH HYPERGLYCEMIA, WITH LONG-TERM CURRENT USE OF INSULIN (MULTI): ICD-10-CM

## 2023-12-07 DIAGNOSIS — N18.1 CKD STAGE 1 DUE TO TYPE 2 DIABETES MELLITUS (MULTI): ICD-10-CM

## 2023-12-07 DIAGNOSIS — E11.22 CKD STAGE 1 DUE TO TYPE 2 DIABETES MELLITUS (MULTI): ICD-10-CM

## 2023-12-07 DIAGNOSIS — E78.2 MIXED HYPERLIPIDEMIA: ICD-10-CM

## 2023-12-07 DIAGNOSIS — I10 ESSENTIAL HYPERTENSION: ICD-10-CM

## 2023-12-11 ENCOUNTER — LAB (OUTPATIENT)
Dept: LAB | Facility: LAB | Age: 73
End: 2023-12-11
Payer: MEDICARE

## 2023-12-11 DIAGNOSIS — E11.65 TYPE 2 DIABETES MELLITUS WITH HYPERGLYCEMIA, WITH LONG-TERM CURRENT USE OF INSULIN (MULTI): ICD-10-CM

## 2023-12-11 DIAGNOSIS — I10 ESSENTIAL HYPERTENSION: ICD-10-CM

## 2023-12-11 DIAGNOSIS — M10.9 GOUT, UNSPECIFIED CAUSE, UNSPECIFIED CHRONICITY, UNSPECIFIED SITE: ICD-10-CM

## 2023-12-11 DIAGNOSIS — Z79.4 TYPE 2 DIABETES MELLITUS WITH HYPERGLYCEMIA, WITH LONG-TERM CURRENT USE OF INSULIN (MULTI): ICD-10-CM

## 2023-12-11 DIAGNOSIS — E78.2 MIXED HYPERLIPIDEMIA: ICD-10-CM

## 2023-12-11 DIAGNOSIS — N18.1 CKD STAGE 1 DUE TO TYPE 2 DIABETES MELLITUS (MULTI): ICD-10-CM

## 2023-12-11 DIAGNOSIS — E11.22 CKD STAGE 1 DUE TO TYPE 2 DIABETES MELLITUS (MULTI): ICD-10-CM

## 2023-12-11 LAB
ALBUMIN SERPL BCP-MCNC: 3.9 G/DL (ref 3.4–5)
ALP SERPL-CCNC: 61 U/L (ref 33–136)
ALT SERPL W P-5'-P-CCNC: 20 U/L (ref 7–45)
ANION GAP SERPL CALC-SCNC: 11 MMOL/L (ref 10–20)
AST SERPL W P-5'-P-CCNC: 18 U/L (ref 9–39)
BILIRUB SERPL-MCNC: 1.2 MG/DL (ref 0–1.2)
BUN SERPL-MCNC: 20 MG/DL (ref 6–23)
CALCIUM SERPL-MCNC: 9.8 MG/DL (ref 8.6–10.3)
CHLORIDE SERPL-SCNC: 101 MMOL/L (ref 98–107)
CHOLEST SERPL-MCNC: 168 MG/DL (ref 0–199)
CHOLESTEROL/HDL RATIO: 3.6
CO2 SERPL-SCNC: 33 MMOL/L (ref 21–32)
CREAT SERPL-MCNC: 0.87 MG/DL (ref 0.5–1.05)
ERYTHROCYTE [DISTWIDTH] IN BLOOD BY AUTOMATED COUNT: 13.5 % (ref 11.5–14.5)
EST. AVERAGE GLUCOSE BLD GHB EST-MCNC: 169 MG/DL
GFR SERPL CREATININE-BSD FRML MDRD: 71 ML/MIN/1.73M*2
GLUCOSE SERPL-MCNC: 133 MG/DL (ref 74–99)
HBA1C MFR BLD: 7.5 %
HCT VFR BLD AUTO: 38.4 % (ref 36–46)
HDLC SERPL-MCNC: 47.2 MG/DL
HGB BLD-MCNC: 12.9 G/DL (ref 12–16)
LDLC SERPL CALC-MCNC: 95 MG/DL
MCH RBC QN AUTO: 30.5 PG (ref 26–34)
MCHC RBC AUTO-ENTMCNC: 33.6 G/DL (ref 32–36)
MCV RBC AUTO: 91 FL (ref 80–100)
NON HDL CHOLESTEROL: 121 MG/DL (ref 0–149)
NRBC BLD-RTO: 0 /100 WBCS (ref 0–0)
PLATELET # BLD AUTO: 150 X10*3/UL (ref 150–450)
POTASSIUM SERPL-SCNC: 4.2 MMOL/L (ref 3.5–5.3)
PROT SERPL-MCNC: 6.9 G/DL (ref 6.4–8.2)
RBC # BLD AUTO: 4.23 X10*6/UL (ref 4–5.2)
SODIUM SERPL-SCNC: 141 MMOL/L (ref 136–145)
TRIGL SERPL-MCNC: 130 MG/DL (ref 0–149)
URATE SERPL-MCNC: 7.9 MG/DL (ref 2.3–6.7)
VLDL: 26 MG/DL (ref 0–40)
WBC # BLD AUTO: 7.1 X10*3/UL (ref 4.4–11.3)

## 2023-12-11 PROCEDURE — 84550 ASSAY OF BLOOD/URIC ACID: CPT

## 2023-12-11 PROCEDURE — 80061 LIPID PANEL: CPT

## 2023-12-11 PROCEDURE — 80053 COMPREHEN METABOLIC PANEL: CPT

## 2023-12-11 PROCEDURE — 85027 COMPLETE CBC AUTOMATED: CPT

## 2023-12-11 PROCEDURE — 83036 HEMOGLOBIN GLYCOSYLATED A1C: CPT

## 2023-12-11 PROCEDURE — 36415 COLL VENOUS BLD VENIPUNCTURE: CPT

## 2023-12-15 ENCOUNTER — TELEMEDICINE (OUTPATIENT)
Dept: PRIMARY CARE | Facility: CLINIC | Age: 73
End: 2023-12-15
Payer: MEDICARE

## 2023-12-15 VITALS — BODY MASS INDEX: 37.82 KG/M2 | WEIGHT: 227 LBS | HEIGHT: 65 IN

## 2023-12-15 DIAGNOSIS — Z79.4 TYPE 2 DIABETES MELLITUS WITH HYPERGLYCEMIA, WITH LONG-TERM CURRENT USE OF INSULIN (MULTI): Primary | ICD-10-CM

## 2023-12-15 DIAGNOSIS — E78.2 MIXED HYPERLIPIDEMIA: ICD-10-CM

## 2023-12-15 DIAGNOSIS — N18.1 CKD STAGE 1 DUE TO TYPE 2 DIABETES MELLITUS (MULTI): ICD-10-CM

## 2023-12-15 DIAGNOSIS — E11.65 TYPE 2 DIABETES MELLITUS WITH HYPERGLYCEMIA, WITH LONG-TERM CURRENT USE OF INSULIN (MULTI): Primary | ICD-10-CM

## 2023-12-15 DIAGNOSIS — U07.1 COVID-19: ICD-10-CM

## 2023-12-15 DIAGNOSIS — E79.0 HYPERURICEMIA: ICD-10-CM

## 2023-12-15 DIAGNOSIS — E11.22 CKD STAGE 1 DUE TO TYPE 2 DIABETES MELLITUS (MULTI): ICD-10-CM

## 2023-12-15 PROCEDURE — 99214 OFFICE O/P EST MOD 30 MIN: CPT | Performed by: FAMILY MEDICINE

## 2023-12-15 ASSESSMENT — PATIENT HEALTH QUESTIONNAIRE - PHQ9
1. LITTLE INTEREST OR PLEASURE IN DOING THINGS: NOT AT ALL
2. FEELING DOWN, DEPRESSED OR HOPELESS: NOT AT ALL
SUM OF ALL RESPONSES TO PHQ9 QUESTIONS 1 AND 2: 0

## 2023-12-15 ASSESSMENT — ANXIETY QUESTIONNAIRES
GAD7 TOTAL SCORE: 0
3. WORRYING TOO MUCH ABOUT DIFFERENT THINGS: NOT AT ALL
1. FEELING NERVOUS, ANXIOUS, OR ON EDGE: NOT AT ALL
4. TROUBLE RELAXING: NOT AT ALL
7. FEELING AFRAID AS IF SOMETHING AWFUL MIGHT HAPPEN: NOT AT ALL
2. NOT BEING ABLE TO STOP OR CONTROL WORRYING: NOT AT ALL
6. BECOMING EASILY ANNOYED OR IRRITABLE: NOT AT ALL
IF YOU CHECKED OFF ANY PROBLEMS ON THIS QUESTIONNAIRE, HOW DIFFICULT HAVE THESE PROBLEMS MADE IT FOR YOU TO DO YOUR WORK, TAKE CARE OF THINGS AT HOME, OR GET ALONG WITH OTHER PEOPLE: NOT DIFFICULT AT ALL
5. BEING SO RESTLESS THAT IT IS HARD TO SIT STILL: NOT AT ALL

## 2023-12-15 NOTE — PROGRESS NOTES
"Subjective   Patient ID: Vanessa Son \"Valerie" is a 73 y.o. female who presents and consented for VIRTUAL VISIT ---     VIDEO  C19: x2  Flu: Decline  Pneum: 8/2023  PAP: Hysterectomy  Mamm: Decline  ColoRect: 2019  PHQ 2: UTD  Is taking ivermectin-I advised it is not fda approved and is ama    Feels a bit better  Hba1c 7.5  Uric acid 7.9    Feels lungs are clear    Chief Complaint   Patient presents with    COVID     Positive for covid since this past Wednesday. Pt has had chills and fever not anymore. Has a sore throat not much congestion, no headache. No body aces but feels very fatigue. She has done a nasal rinse.        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 (CMS/[facility])    Essential hypertension    Gout    Hyperuricemia    Hematuria    Lichen sclerosus    Primary osteoarthritis of right knee    Status post total knee replacement    Class 3 severe obesity due to excess calories with serious comorbidity and body mass index (BMI) of 40.0 to 44.9 in adult (CMS/[facility])    Encounter for screening mammogram for breast cancer    Need for vaccination    Type 2 diabetes mellitus with hyperglycemia, with long-term current use of insulin (CMS/[facility])    Mixed hyperlipidemia       Past Surgical History:   Procedure Laterality Date    APPENDECTOMY  1963    CATARACT EXTRACTION  2018    CHOLECYSTECTOMY  1974    COLONOSCOPY  2019    Incomplete so Barium Enema + tics only    HYSTERECTOMY  1991    uterine CA limited no rad tx nor chemo    TONSILLECTOMY  1991    TOTAL KNEE ARTHROPLASTY Right 10/2021    TUBAL LIGATION  1977       Review of Systems  This patient has   NO history of recent Covid nor flu symptoms,      NO Fever nor chills,  NO Chest pain, shortness of breath nor paroxysmal nocturnal dyspnea,  NO Nausea, vomiting, nor diarrhea,  NO Hematochezia nor melena,  NO Dysuria, hematuria, nor new incontinence issues  NO new severe headaches nor " "neurological complaints,  NO new issues with anxiety nor depression nor new psychiatric complaints,  NO suicidal nor homicidal ideations.     OBJECTIVE:  Ht 1.651 m (5' 5\")   Wt 103 kg (227 lb)   LMP  (LMP Unknown)   BMI 37.77 kg/m²      General:  alert, oriented, no acute distress. Appears a bit sinus congested    Mood is pleasant, not tearful, no signs of emotional distress.  Not appearing intoxicated or altered.   No voiced delusions,   Normal, appropriate behavior.    HEENT: Normocephalic, atraumatic,   Pupils round, reactive to light  Extraocular motions intact and wnl    Conversing sans difficulty does NOT appear to be short of breath-in severe pain or toxic appearing          Lab on 12/11/2023   Component Date Value Ref Range Status    Glucose 12/11/2023 133 (H)  74 - 99 mg/dL Final    Sodium 12/11/2023 141  136 - 145 mmol/L Final    Potassium 12/11/2023 4.2  3.5 - 5.3 mmol/L Final    Chloride 12/11/2023 101  98 - 107 mmol/L Final    Bicarbonate 12/11/2023 33 (H)  21 - 32 mmol/L Final    Anion Gap 12/11/2023 11  10 - 20 mmol/L Final    Urea Nitrogen 12/11/2023 20  6 - 23 mg/dL Final    Creatinine 12/11/2023 0.87  0.50 - 1.05 mg/dL Final    eGFR 12/11/2023 71  >60 mL/min/1.73m*2 Final    Calculations of estimated GFR are performed using the 2021 CKD-EPI Study Refit equation without the race variable for the IDMS-Traceable creatinine methods.  https://jasn.asnjournals.org/content/early/2021/09/22/ASN.4281705942    Calcium 12/11/2023 9.8  8.6 - 10.3 mg/dL Final    Albumin 12/11/2023 3.9  3.4 - 5.0 g/dL Final    Alkaline Phosphatase 12/11/2023 61  33 - 136 U/L Final    Total Protein 12/11/2023 6.9  6.4 - 8.2 g/dL Final    AST 12/11/2023 18  9 - 39 U/L Final    Bilirubin, Total 12/11/2023 1.2  0.0 - 1.2 mg/dL Final    ALT 12/11/2023 20  7 - 45 U/L Final    Patients treated with Sulfasalazine may generate falsely decreased results for ALT.    Cholesterol 12/11/2023 168  0 - 199 mg/dL Final          Age      " Desirable   Borderline High   High     0-19 Y     0 - 169       170 - 199     >/= 200    20-24 Y     0 - 189       190 - 224     >/= 225         >24 Y     0 - 199       200 - 239     >/= 240   **All ranges are based on fasting samples. Specific   therapeutic targets will vary based on patient-specific   cardiac risk.    Pediatric guidelines reference:Pediatrics 2011, 128(S5).Adult guidelines reference: NCEP ATPIII Guidelines,ASHLEIGH 2001, 258:0786-97    Venipuncture immediately after or during the administration of Metamizole may lead to falsely low results. Testing should be performed immediately prior to Metamizole dosing.    HDL-Cholesterol 12/11/2023 47.2  mg/dL Final      Age       Very Low   Low     Normal    High    0-19 Y    < 35      < 40     40-45     ----  20-24 Y    ----     < 40      >45      ----        >24 Y      ----     < 40     40-60      >60      Cholesterol/HDL Ratio 12/11/2023 3.6   Final      Ref Values  Desirable  < 3.4  High Risk  > 5.0    LDL Calculated 12/11/2023 95  <=99 mg/dL Final                                Near   Borderline      AGE      Desirable  Optimal    High     High     Very High     0-19 Y     0 - 109     ---    110-129   >/= 130     ----    20-24 Y     0 - 119     ---    120-159   >/= 160     ----      >24 Y     0 -  99   100-129  130-159   160-189     >/=190      VLDL 12/11/2023 26  0 - 40 mg/dL Final    Triglycerides 12/11/2023 130  0 - 149 mg/dL Final       Age         Desirable   Borderline High   High     Very High   0 D-90 D    19 - 174         ----         ----        ----  91 D- 9 Y     0 -  74        75 -  99     >/= 100      ----    10-19 Y     0 -  89        90 - 129     >/= 130      ----    20-24 Y     0 - 114       115 - 149     >/= 150      ----         >24 Y     0 - 149       150 - 199    200- 499    >/= 500    Venipuncture immediately after or during the administration of Metamizole may lead to falsely low results. Testing should be performed immediately prior  to Metamizole dosing.    Non HDL Cholesterol 12/11/2023 121  0 - 149 mg/dL Final          Age       Desirable   Borderline High   High     Very High     0-19 Y     0 - 119       120 - 144     >/= 145    >/= 160    20-24 Y     0 - 149       150 - 189     >/= 190      ----         >24 Y    30 mg/dL above LDL Cholesterol goal      WBC 12/11/2023 7.1  4.4 - 11.3 x10*3/uL Final    nRBC 12/11/2023 0.0  0.0 - 0.0 /100 WBCs Final    RBC 12/11/2023 4.23  4.00 - 5.20 x10*6/uL Final    Hemoglobin 12/11/2023 12.9  12.0 - 16.0 g/dL Final    Hematocrit 12/11/2023 38.4  36.0 - 46.0 % Final    MCV 12/11/2023 91  80 - 100 fL Final    MCH 12/11/2023 30.5  26.0 - 34.0 pg Final    MCHC 12/11/2023 33.6  32.0 - 36.0 g/dL Final    RDW 12/11/2023 13.5  11.5 - 14.5 % Final    Platelets 12/11/2023 150  150 - 450 x10*3/uL Final    Hemoglobin A1C 12/11/2023 7.5 (H)  see below % Final    Estimated Average Glucose 12/11/2023 169  Not Established mg/dL Final    Uric Acid 12/11/2023 7.9 (H)  2.3 - 6.7 mg/dL Final    Venipuncture immediately after or during the administration of Metamizole may lead to falsely low results. Testing should be performed immediately  prior to Metamizole dosing.        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 IF condition deteriorates from here-ER IS DEFINITELY ADVISED.  Will call if sxs worsen  As for dm2-wants to watch sugars  And again had a lengthy discussion w pt  about risks of poorly controlled diabetes including micro and macrovascular complications of DM2 including blindness,MI,CVA and death among other possibilities. Pt  aware and agrees to better compliance and adherance to instructions such as regular eye exams q 1-2 y, foot exams,and f/u regularly for hba1c with a goal of 6.5.    NO evidence of neuropathy or nephropathy at this point    Follow up as planned for hba1c and BP checks REGULARLY.  Is already on baby asa every day  Covid help tips and need for er sxs or signs dw pt  Has p ox-hasn't tried  No gi sxs   See me next wk if worse or still w sxs-consider cxr  Otherwise 3mo  ?s answered

## 2023-12-15 NOTE — PROGRESS NOTES
C19: x2  Flu: Decline  Pneum: 8/2023  PAP: Hysterectomy  Mamm: Decline  ColoRect: 2019  PHQ 2: UTD

## 2024-01-12 ENCOUNTER — HOSPITAL ENCOUNTER (EMERGENCY)
Facility: HOSPITAL | Age: 74
Discharge: HOME | End: 2024-01-12
Attending: EMERGENCY MEDICINE
Payer: MEDICARE

## 2024-01-12 ENCOUNTER — APPOINTMENT (OUTPATIENT)
Dept: RADIOLOGY | Facility: HOSPITAL | Age: 74
End: 2024-01-12
Payer: MEDICARE

## 2024-01-12 VITALS
BODY MASS INDEX: 36 KG/M2 | HEART RATE: 75 BPM | TEMPERATURE: 97.7 F | SYSTOLIC BLOOD PRESSURE: 172 MMHG | RESPIRATION RATE: 18 BRPM | WEIGHT: 216.05 LBS | OXYGEN SATURATION: 99 % | DIASTOLIC BLOOD PRESSURE: 74 MMHG | HEIGHT: 65 IN

## 2024-01-12 DIAGNOSIS — N82.3 RECTOVAGINAL FISTULA: Primary | ICD-10-CM

## 2024-01-12 DIAGNOSIS — R31.9 URINARY TRACT INFECTION WITH HEMATURIA, SITE UNSPECIFIED: ICD-10-CM

## 2024-01-12 DIAGNOSIS — N39.0 URINARY TRACT INFECTION WITH HEMATURIA, SITE UNSPECIFIED: ICD-10-CM

## 2024-01-12 LAB
ALBUMIN SERPL BCP-MCNC: 3.7 G/DL (ref 3.4–5)
ALP SERPL-CCNC: 65 U/L (ref 33–136)
ALT SERPL W P-5'-P-CCNC: 21 U/L (ref 7–45)
ANION GAP SERPL CALC-SCNC: 12 MMOL/L (ref 10–20)
APPEARANCE UR: ABNORMAL
AST SERPL W P-5'-P-CCNC: 29 U/L (ref 9–39)
BACTERIA #/AREA URNS AUTO: ABNORMAL /HPF
BASOPHILS # BLD AUTO: 0.03 X10*3/UL (ref 0–0.1)
BASOPHILS NFR BLD AUTO: 0.5 %
BILIRUB SERPL-MCNC: 0.7 MG/DL (ref 0–1.2)
BILIRUB UR STRIP.AUTO-MCNC: NEGATIVE MG/DL
BUN SERPL-MCNC: 21 MG/DL (ref 6–23)
CALCIUM SERPL-MCNC: 9.9 MG/DL (ref 8.6–10.3)
CHLORIDE SERPL-SCNC: 97 MMOL/L (ref 98–107)
CO2 SERPL-SCNC: 30 MMOL/L (ref 21–32)
COLOR UR: ABNORMAL
CREAT SERPL-MCNC: 0.87 MG/DL (ref 0.5–1.05)
EGFRCR SERPLBLD CKD-EPI 2021: 71 ML/MIN/1.73M*2
EOSINOPHIL # BLD AUTO: 0.14 X10*3/UL (ref 0–0.4)
EOSINOPHIL NFR BLD AUTO: 2.5 %
ERYTHROCYTE [DISTWIDTH] IN BLOOD BY AUTOMATED COUNT: 12.9 % (ref 11.5–14.5)
GLUCOSE SERPL-MCNC: 165 MG/DL (ref 74–99)
GLUCOSE UR STRIP.AUTO-MCNC: NEGATIVE MG/DL
HCT VFR BLD AUTO: 39.6 % (ref 36–46)
HGB BLD-MCNC: 13.7 G/DL (ref 12–16)
HOLD SPECIMEN: NORMAL
HYALINE CASTS #/AREA URNS AUTO: ABNORMAL /LPF
IMM GRANULOCYTES # BLD AUTO: 0.04 X10*3/UL (ref 0–0.5)
IMM GRANULOCYTES NFR BLD AUTO: 0.7 % (ref 0–0.9)
KETONES UR STRIP.AUTO-MCNC: NEGATIVE MG/DL
LACTATE SERPL-SCNC: 1.3 MMOL/L (ref 0.4–2)
LEUKOCYTE ESTERASE UR QL STRIP.AUTO: ABNORMAL
LIPASE SERPL-CCNC: 43 U/L (ref 9–82)
LYMPHOCYTES # BLD AUTO: 1.01 X10*3/UL (ref 0.8–3)
LYMPHOCYTES NFR BLD AUTO: 17.8 %
MCH RBC QN AUTO: 30.6 PG (ref 26–34)
MCHC RBC AUTO-ENTMCNC: 34.6 G/DL (ref 32–36)
MCV RBC AUTO: 89 FL (ref 80–100)
MONOCYTES # BLD AUTO: 0.34 X10*3/UL (ref 0.05–0.8)
MONOCYTES NFR BLD AUTO: 6 %
NEUTROPHILS # BLD AUTO: 4.12 X10*3/UL (ref 1.6–5.5)
NEUTROPHILS NFR BLD AUTO: 72.5 %
NITRITE UR QL STRIP.AUTO: NEGATIVE
NRBC BLD-RTO: 0 /100 WBCS (ref 0–0)
PH UR STRIP.AUTO: 6 [PH]
PLATELET # BLD AUTO: 284 X10*3/UL (ref 150–450)
POTASSIUM SERPL-SCNC: 3.4 MMOL/L (ref 3.5–5.3)
PROT SERPL-MCNC: 7.7 G/DL (ref 6.4–8.2)
PROT UR STRIP.AUTO-MCNC: ABNORMAL MG/DL
RBC # BLD AUTO: 4.47 X10*6/UL (ref 4–5.2)
RBC # UR STRIP.AUTO: ABNORMAL /UL
RBC #/AREA URNS AUTO: ABNORMAL /HPF
SODIUM SERPL-SCNC: 136 MMOL/L (ref 136–145)
SP GR UR STRIP.AUTO: 1.01
SQUAMOUS #/AREA URNS AUTO: ABNORMAL /HPF
UROBILINOGEN UR STRIP.AUTO-MCNC: 2 MG/DL
WBC # BLD AUTO: 5.7 X10*3/UL (ref 4.4–11.3)
WBC #/AREA URNS AUTO: >50 /HPF
WBC CLUMPS #/AREA URNS AUTO: ABNORMAL /HPF

## 2024-01-12 PROCEDURE — 81001 URINALYSIS AUTO W/SCOPE: CPT | Performed by: PHYSICIAN ASSISTANT

## 2024-01-12 PROCEDURE — 36415 COLL VENOUS BLD VENIPUNCTURE: CPT | Performed by: PHYSICIAN ASSISTANT

## 2024-01-12 PROCEDURE — 96361 HYDRATE IV INFUSION ADD-ON: CPT

## 2024-01-12 PROCEDURE — 2550000001 HC RX 255 CONTRASTS: Mod: 59 | Performed by: PHYSICIAN ASSISTANT

## 2024-01-12 PROCEDURE — 74177 CT ABD & PELVIS W/CONTRAST: CPT | Performed by: RADIOLOGY

## 2024-01-12 PROCEDURE — 2500000004 HC RX 250 GENERAL PHARMACY W/ HCPCS (ALT 636 FOR OP/ED): Performed by: PHYSICIAN ASSISTANT

## 2024-01-12 PROCEDURE — 99283 EMERGENCY DEPT VISIT LOW MDM: CPT | Performed by: REGISTERED NURSE

## 2024-01-12 PROCEDURE — 80053 COMPREHEN METABOLIC PANEL: CPT | Performed by: PHYSICIAN ASSISTANT

## 2024-01-12 PROCEDURE — 87086 URINE CULTURE/COLONY COUNT: CPT | Mod: ELYLAB | Performed by: PHYSICIAN ASSISTANT

## 2024-01-12 PROCEDURE — 96374 THER/PROPH/DIAG INJ IV PUSH: CPT | Mod: 59

## 2024-01-12 PROCEDURE — 2550000001 HC RX 255 CONTRASTS: Performed by: EMERGENCY MEDICINE

## 2024-01-12 PROCEDURE — 83690 ASSAY OF LIPASE: CPT | Performed by: PHYSICIAN ASSISTANT

## 2024-01-12 PROCEDURE — 83605 ASSAY OF LACTIC ACID: CPT | Performed by: PHYSICIAN ASSISTANT

## 2024-01-12 PROCEDURE — 87040 BLOOD CULTURE FOR BACTERIA: CPT | Mod: ELYLAB | Performed by: PHYSICIAN ASSISTANT

## 2024-01-12 PROCEDURE — 99205 OFFICE O/P NEW HI 60 MIN: CPT | Performed by: SURGERY

## 2024-01-12 PROCEDURE — 85025 COMPLETE CBC W/AUTO DIFF WBC: CPT | Performed by: PHYSICIAN ASSISTANT

## 2024-01-12 PROCEDURE — 74177 CT ABD & PELVIS W/CONTRAST: CPT

## 2024-01-12 PROCEDURE — 99284 EMERGENCY DEPT VISIT MOD MDM: CPT | Performed by: EMERGENCY MEDICINE

## 2024-01-12 RX ORDER — CEPHALEXIN 500 MG/1
500 CAPSULE ORAL 2 TIMES DAILY
Qty: 14 CAPSULE | Refills: 0 | Status: SHIPPED | OUTPATIENT
Start: 2024-01-12 | End: 2024-01-19

## 2024-01-12 RX ORDER — POTASSIUM CHLORIDE 20 MEQ/1
40 TABLET, EXTENDED RELEASE ORAL ONCE
Status: COMPLETED | OUTPATIENT
Start: 2024-01-12 | End: 2024-01-12

## 2024-01-12 RX ADMIN — DEXTROSE MONOHYDRATE 2 G: 5 INJECTION INTRAVENOUS at 15:48

## 2024-01-12 RX ADMIN — DIATRIZOATE MEGLUMINE AND DIATRIZOATE SODIUM 30 ML: 660; 100 LIQUID ORAL; RECTAL at 12:55

## 2024-01-12 RX ADMIN — IOHEXOL 75 ML: 350 INJECTION, SOLUTION INTRAVENOUS at 12:26

## 2024-01-12 RX ADMIN — POTASSIUM CHLORIDE 40 MEQ: 1500 TABLET, EXTENDED RELEASE ORAL at 11:45

## 2024-01-12 RX ADMIN — SODIUM CHLORIDE 500 ML: 9 INJECTION, SOLUTION INTRAVENOUS at 11:07

## 2024-01-12 ASSESSMENT — ENCOUNTER SYMPTOMS
EYES NEGATIVE: 1
NEUROLOGICAL NEGATIVE: 1
MUSCULOSKELETAL NEGATIVE: 1
CARDIOVASCULAR NEGATIVE: 1
GASTROINTESTINAL NEGATIVE: 1
PSYCHIATRIC NEGATIVE: 1
ENDOCRINE NEGATIVE: 1
CONSTITUTIONAL NEGATIVE: 1
RESPIRATORY NEGATIVE: 1
BRUISES/BLEEDS EASILY: 1

## 2024-01-12 ASSESSMENT — COLUMBIA-SUICIDE SEVERITY RATING SCALE - C-SSRS
2. HAVE YOU ACTUALLY HAD ANY THOUGHTS OF KILLING YOURSELF?: NO
1. IN THE PAST MONTH, HAVE YOU WISHED YOU WERE DEAD OR WISHED YOU COULD GO TO SLEEP AND NOT WAKE UP?: NO
6. HAVE YOU EVER DONE ANYTHING, STARTED TO DO ANYTHING, OR PREPARED TO DO ANYTHING TO END YOUR LIFE?: NO

## 2024-01-12 ASSESSMENT — LIFESTYLE VARIABLES
HAVE PEOPLE ANNOYED YOU BY CRITICIZING YOUR DRINKING: NO
REASON UNABLE TO ASSESS: NO
EVER FELT BAD OR GUILTY ABOUT YOUR DRINKING: NO
HAVE YOU EVER FELT YOU SHOULD CUT DOWN ON YOUR DRINKING: NO
EVER HAD A DRINK FIRST THING IN THE MORNING TO STEADY YOUR NERVES TO GET RID OF A HANGOVER: NO

## 2024-01-12 ASSESSMENT — PAIN SCALES - GENERAL
PAINLEVEL_OUTOF10: 0 - NO PAIN

## 2024-01-12 ASSESSMENT — PAIN - FUNCTIONAL ASSESSMENT: PAIN_FUNCTIONAL_ASSESSMENT: 0-10

## 2024-01-12 NOTE — ED PROVIDER NOTES
"HPI   Chief Complaint   Patient presents with    Anna thinksI have a fistula     \"Diarrhea is coming out of my vagina.\"  2 days       A 73-year-old female patient comes in the emergency department today with complaints of diarrhea type stool coming from her vagina x 2 days.  States she called her PCP today said to come to the emergency department.  She denies any associated pain with this.  Denies any fevers, chills, nausea, vomiting, diarrhea.  States is never happened before.  States he does have prior history of cholecystectomy, appendectomy, hysterectomy.  For this purpose comes in the emergency department today further evaluation.                          Erich Coma Scale Score: 15                  Patient History   Past Medical History:   Diagnosis Date    History of malignant neoplasm     Hx of mammogram 06/2017    cat 2    Mammogram declined      Past Surgical History:   Procedure Laterality Date    APPENDECTOMY  1963    CATARACT EXTRACTION  2018    CHOLECYSTECTOMY  1974    COLONOSCOPY  2019    Incomplete so Barium Enema + tics only    HYSTERECTOMY  1991    uterine CA limited no rad tx nor chemo    TONSILLECTOMY  1991    TOTAL KNEE ARTHROPLASTY Right 10/2021    TUBAL LIGATION  1977     Family History   Family history unknown: Yes     Social History     Tobacco Use    Smoking status: Never    Smokeless tobacco: Never   Vaping Use    Vaping Use: Never used   Substance Use Topics    Alcohol use: Yes    Drug use: Never       Physical Exam   ED Triage Vitals [01/12/24 1030]   Temp Heart Rate Resp BP   (!) 65.5 °C (149.9 °F) 87 18 (!) 198/87      SpO2 Temp Source Heart Rate Source Patient Position   98 % Oral Monitor Sitting      BP Location FiO2 (%)     Right arm --       Physical Exam  Constitutional:       General: She is not in acute distress.     Appearance: Normal appearance. She is not ill-appearing.   HENT:      Head: Normocephalic and atraumatic.      Nose: Nose normal.   Eyes:      Extraocular " Movements: Extraocular movements intact.      Conjunctiva/sclera: Conjunctivae normal.      Pupils: Pupils are equal, round, and reactive to light.   Cardiovascular:      Rate and Rhythm: Normal rate and regular rhythm.   Pulmonary:      Effort: Pulmonary effort is normal. No respiratory distress.      Breath sounds: Normal breath sounds. No stridor. No wheezing.   Abdominal:      General: Abdomen is flat. Bowel sounds are normal.      Palpations: Abdomen is soft.      Tenderness: There is no abdominal tenderness. There is no guarding or rebound.      Hernia: No hernia is present.   Musculoskeletal:         General: Normal range of motion.      Cervical back: Normal range of motion.   Skin:     General: Skin is warm and dry.   Neurological:      General: No focal deficit present.      Mental Status: She is alert and oriented to person, place, and time. Mental status is at baseline.   Psychiatric:         Mood and Affect: Mood normal.         ED Course & MDM   Diagnoses as of 01/12/24 1541   Rectovaginal fistula   Urinary tract infection with hematuria, site unspecified       Medical Decision Making  A 73-year-old female patient comes in the emergency department today with complaints of diarrhea type stool coming from her vagina x 2 days.  States she called her PCP today said to come to the emergency department.  She denies any associated pain with this.  Denies any fevers, chills, nausea, vomiting, diarrhea.  States is never happened before.  States he does have prior history of cholecystectomy, appendectomy, hysterectomy.  For this purpose comes in the emergency department today further evaluation.    CT of the abdomen pelvis ordered with rectal contrast to rule out enterovaginal fistula.  Laboratory studies ordered to rule out leukocytosis, UTI, electrolyte abnormalities, acute kidney injury.    Patient's urinalysis positive for infection.  Lactate 1.3 lipase 43.  Patient's potassium 3.4.  Patient was given p.o.  potassium for replacement.  Patient CBC negative.  Patient CT study is consistent with rectovaginal fistula.  Patient also has what could be chronic diverticulitis.    I called and discussed these findings with Dr. Davila with general surgery.  He given evaluated the patient personally.  Is recommending to discharge the patient with antibiotics for her UTI and have her follow-up with Dr. Argueta with colorectal surgery.  Difficult to get into Dr. Argueta she can follow-up with Dr. Davila in the meantime.  Patient will be discharged home.  Patient agrees this plan expressed full verbal understanding.  All questions answered.    Historian is the patient    Diagnosis: Rectovaginal fistula, UTI      Labs Reviewed   COMPREHENSIVE METABOLIC PANEL - Abnormal       Result Value    Glucose 165 (*)     Sodium 136      Potassium 3.4 (*)     Chloride 97 (*)     Bicarbonate 30      Anion Gap 12      Urea Nitrogen 21      Creatinine 0.87      eGFR 71      Calcium 9.9      Albumin 3.7      Alkaline Phosphatase 65      Total Protein 7.7      AST 29      Bilirubin, Total 0.7      ALT 21     URINALYSIS WITH REFLEX CULTURE AND MICROSCOPIC - Abnormal    Color, Urine [place] (*)     Appearance, Urine Hazy (*)     Specific Gravity, Urine 1.006      pH, Urine 6.0      Protein, Urine 30 (1+) (*)     Glucose, Urine NEGATIVE      Blood, Urine MODERATE (2+) (*)     Ketones, Urine NEGATIVE      Bilirubin, Urine NEGATIVE      Urobilinogen, Urine 2.0 (*)     Nitrite, Urine NEGATIVE      Leukocyte Esterase, Urine LARGE (3+) (*)    MICROSCOPIC ONLY, URINE - Abnormal    WBC, Urine >50 (*)     WBC Clumps, Urine OCCASIONAL      RBC, Urine 11-20 (*)     Squamous Epithelial Cells, Urine 1-9 (SPARSE)      Bacteria, Urine 4+ (*)     Hyaline Casts, Urine 3+ (*)    LIPASE - Normal    Lipase 43      Narrative:     Venipuncture immediately after or during the administration of Metamizole may lead to falsely low results. Testing should be performed immediately prior  to Metamizole dosing.   LACTATE - Normal    Lactate 1.3      Narrative:     Venipuncture immediately after or during the administration of Metamizole may lead to falsely low results. Testing should be performed immediately  prior to Metamizole dosing.   URINE CULTURE   BLOOD CULTURE   BLOOD CULTURE   CBC WITH AUTO DIFFERENTIAL    WBC 5.7      nRBC 0.0      RBC 4.47      Hemoglobin 13.7      Hematocrit 39.6      MCV 89      MCH 30.6      MCHC 34.6      RDW 12.9      Platelets 284      Neutrophils % 72.5      Immature Granulocytes %, Automated 0.7      Lymphocytes % 17.8      Monocytes % 6.0      Eosinophils % 2.5      Basophils % 0.5      Neutrophils Absolute 4.12      Immature Granulocytes Absolute, Automated 0.04      Lymphocytes Absolute 1.01      Monocytes Absolute 0.34      Eosinophils Absolute 0.14      Basophils Absolute 0.03     URINALYSIS WITH REFLEX CULTURE AND MICROSCOPIC    Narrative:     The following orders were created for panel order Urinalysis with Reflex Culture and Microscopic.  Procedure                               Abnormality         Status                     ---------                               -----------         ------                     Urinalysis with Reflex C...[[id number]]  Abnormal            Final result               Extra Urine Gray Tube[[id number]]                            In process                   Please view results for these tests on the individual orders.   EXTRA URINE GRAY TUBE        CT abdomen pelvis w IV contrast   Final Result   Previous hysterectomy. Nonspecific thickening of the vaginal wall.   Positive contrast visualized throughout the vaginal canal compatible   with rectovaginal fistula. Ill-defined wall thickening near the   anorectal junction which is inseparable from the posterior vaginal   wall, most likely the site of communication.        Colonic diverticulosis. Indeterminate mild wall thickening of the   proximal-mid sigmoid colon could be related to  chronic   diverticulitis. Colonic neoplasm not excluded.        Cirrhosis.        Portal hypertension changes including splenomegaly and recanalized   paraumbilical vein.        MACRO:   None.        Signed by: Aiden Casey 1/12/2024 1:23 PM   Dictation workstation:   [id number]            Procedure  Procedures     Tariq Olivera PA-C  01/12/24 1542

## 2024-01-12 NOTE — CONSULTS
"Inpatient consult to Acute Care Surgery  Consult performed by: Lakeshia Villaseñor, APRN-CNP  Consult ordered by: Karlie Keene MD  Reason for consult: fistula      General Surgery Consult Note  Patient: Vanessa Son  Unit/Bed: AC23/23  YOB: 1950  MRN: [id number]  Acct: [id number]   Admitting Diagnosis: No admission diagnoses are documented for this encounter.  Date:  1/12/2024  Hospital Day: 0  Attending: Karlie Keene MD      Complaint:  Chief Complaint   Patient presents with    Docotr thinksI have a fistula     \"Diarrhea is coming out of my vagina.\"  2 days     History of Present Illness:  73-year-old female patient comes to the emergency department today with complaints of diarrhea type stool coming from her vagina x 2 days. States she called her PCP today and she advised her to come to the emergency department.  She denies any associated pain with this. Denies any fevers, chills, nausea, vomiting, diarrhea.  States this has never happened before.  States she does have prior history of cholecystectomy, appendectomy, hysterectomy.    Allergies:  Allergies   Allergen Reactions    Atorvastatin Unknown    Enalapril Unknown    Hydroxyzine Unknown    Simvastatin Unknown    Terazosin Unknown     PMHx:  Past Medical History:   Diagnosis Date    History of malignant neoplasm     Hx of mammogram 06/2017    cat 2    Mammogram declined      PSHx:  Past Surgical History:   Procedure Laterality Date    APPENDECTOMY  1963    CATARACT EXTRACTION  2018    CHOLECYSTECTOMY  1974    COLONOSCOPY  2019    Incomplete so Barium Enema + tics only    HYSTERECTOMY  1991    uterine CA limited no rad tx nor chemo    TONSILLECTOMY  1991    TOTAL KNEE ARTHROPLASTY Right 10/2021    TUBAL LIGATION  1977     Social Hx:  Social History     Socioeconomic History    Marital status:      Spouse name: None    Number of children: None    Years of education: None    Highest education level: None   Occupational History    " None   Tobacco Use    Smoking status: Never    Smokeless tobacco: Never   Vaping Use    Vaping Use: Never used   Substance and Sexual Activity    Alcohol use: Yes    Drug use: Never    Sexual activity: None   Other Topics Concern    None   Social History Narrative    None     Social Determinants of Health     Financial Resource Strain: Not on file   Food Insecurity: Not on file   Transportation Needs: Not on file   Physical Activity: Not on file   Stress: Not on file   Social Connections: Not on file   Intimate Partner Violence: Not on file   Housing Stability: Not on file     Family Hx:  Family History   Family history unknown: Yes     Review of Systems:   Review of Systems   Constitutional: Negative.    HENT: Negative.     Eyes: Negative.    Respiratory: Negative.     Cardiovascular: Negative.    Gastrointestinal: Negative.    Endocrine: Negative.    Genitourinary:         Diarrhea/Stool coming out of vagina   Musculoskeletal: Negative.    Skin: Negative.    Neurological: Negative.    Hematological:  Bruises/bleeds easily.   Psychiatric/Behavioral: Negative.       Physical Examination:    Visit Vitals  /74 (BP Location: Right arm, Patient Position: Sitting)   Pulse 75   Temp 36.5 °C (97.7 °F)   Resp 18      Physical Exam  Vitals reviewed.   Constitutional:       Appearance: Normal appearance. She is obese.   HENT:      Head: Normocephalic.      Nose: Nose normal.      Mouth/Throat:      Mouth: Mucous membranes are moist.   Cardiovascular:      Rate and Rhythm: Normal rate and regular rhythm.   Pulmonary:      Effort: Pulmonary effort is normal.      Breath sounds: Normal breath sounds.   Abdominal:      General: Abdomen is flat. Bowel sounds are normal.      Palpations: Abdomen is soft.   Genitourinary:     Comments: Diarrhea/Stool coming out vagina  Skin:     General: Skin is warm.      Capillary Refill: Capillary refill takes less than 2 seconds.   Neurological:      General: No focal deficit present.       "Mental Status: She is alert and oriented to person, place, and time.   Psychiatric:         Mood and Affect: Mood normal.       LABS:  CBC:   Lab Results   Component Value Date    WBC 5.7 01/12/2024    RBC 4.47 01/12/2024    HGB 13.7 01/12/2024    HCT 39.6 01/12/2024    MCV 89 01/12/2024    MCH 30.6 01/12/2024    MCHC 34.6 01/12/2024    RDW 12.9 01/12/2024     01/12/2024     CBC with Differential:    Lab Results   Component Value Date    WBC 5.7 01/12/2024    RBC 4.47 01/12/2024    HGB 13.7 01/12/2024    HCT 39.6 01/12/2024     01/12/2024    MCV 89 01/12/2024    MCH 30.6 01/12/2024    MCHC 34.6 01/12/2024    RDW 12.9 01/12/2024    NRBC 0.0 01/12/2024    LYMPHOPCT 17.8 01/12/2024    MONOPCT 6.0 01/12/2024    EOSPCT 2.5 01/12/2024    BASOPCT 0.5 01/12/2024    MONOSABS 0.34 01/12/2024    LYMPHSABS 1.01 01/12/2024    EOSABS 0.14 01/12/2024    BASOSABS 0.03 01/12/2024     CMP:    Lab Results   Component Value Date     01/12/2024    K 3.4 (L) 01/12/2024    CL 97 (L) 01/12/2024    CO2 30 01/12/2024    BUN 21 01/12/2024    CREATININE 0.87 01/12/2024    GLUCOSE 165 (H) 01/12/2024    PROT 7.7 01/12/2024    CALCIUM 9.9 01/12/2024    BILITOT 0.7 01/12/2024    ALKPHOS 65 01/12/2024    AST 29 01/12/2024    ALT 21 01/12/2024     BMP:    Lab Results   Component Value Date     01/12/2024    K 3.4 (L) 01/12/2024    CL 97 (L) 01/12/2024    CO2 30 01/12/2024    BUN 21 01/12/2024    CREATININE 0.87 01/12/2024    CALCIUM 9.9 01/12/2024    GLUCOSE 165 (H) 01/12/2024     Magnesium:No results found for: \"MG\"  Troponin:  No results found for: \"TROPHS\"    Lipid Panel:  No results found for: \"HDL\", \"CHHDL\", \"VLDL\", \"TRIG\", \"NHDL\"   Current Medications:    Current Facility-Administered Medications:     cefTRIAXone (Rocephin) in dextrose 5 % water (D5W) 50 mL IV 2 g, 2 g, intravenous, Once, Tariq Olviera PA-C    Current Outpatient Medications:     amLODIPine (Norvasc) 5 mg tablet, Take 1 tablet (5 mg) by mouth once " daily., Disp: 90 tablet, Rfl: 3    amoxicillin (Amoxil) 500 mg tablet, Take 1 tablet (500 mg) by mouth. 4 Tabs 1 hour prior to procedure, Disp: , Rfl:     aspirin 81 mg EC tablet, Take 1 tablet (81 mg) by mouth once daily., Disp: , Rfl:     cholecalciferol (Vitamin D-3) 5,000 Units tablet, Take by mouth., Disp: , Rfl:     colchicine 0.6 mg tablet, TAKE 1 TABLET DAILY AS NEEDED  FOR GOUT, Disp: 90 tablet, Rfl: 3    hydrocortisone 1 % cream, Apply topically 2 times a day., Disp: 90 g, Rfl: 3    indapamide (Lozol) 1.25 mg tablet, Take 1 tablet (1.25 mg) by mouth once daily., Disp: 90 tablet, Rfl: 3    insulin lispro (HumaLOG KwikPen Insulin) 100 unit/mL injection, Inject 12 Units under the skin 3 times a day with meals., Disp: 45 mL, Rfl: 3    Lantus Solostar U-100 Insulin 100 unit/mL (3 mL) pen, INJECT SUBCUTANEOUSLY 100 UNITS  DAILY, Disp: 90 mL, Rfl: 3    losartan-hydrochlorothiazide (Hyzaar) 50-12.5 mg tablet, Take 0.5 tablets by mouth once daily., Disp: 45 tablet, Rfl: 0    magnesium 250 mg tablet, Take by mouth., Disp: , Rfl:     metoprolol tartrate (Lopressor) 100 mg tablet, Take 1 tablet (100 mg) by mouth 2 times a day., Disp: 180 tablet, Rfl: 3    OneTouch Ultra Test strip, TEST 3 TIMES DAILY, Disp: 300 strip, Rfl: 3    pen needle, diabetic (BD ULTRA-FINE MINI PEN NEEDLE MISC), , Disp: , Rfl:     potassium citrate 99 mg capsule, Take by mouth., Disp: , Rfl:     Kelsi's wort 300 mg capsule, Take 300 mg by mouth., Disp: , Rfl:     Victoza 3-Abiel 0.6 mg/0.1 mL (18 mg/3 mL) injection, INJECT SUBCUTANEOUSLY 1.8 MG  DAILY, Disp: 27 mL, Rfl: 3    CT abdomen pelvis w IV contrast    Result Date: 1/12/2024  Interpreted By:  Aiden Casey, STUDY: CT ABDOMEN PELVIS W IV CONTRAST;  1/12/2024 12:38 pm   INDICATION: Signs/Symptoms:Stool coming from vagina x 2 days per patient.   COMPARISON: None.   ACCESSION NUMBER(S): [id number]   ORDERING CLINICIAN: EDDIE SALVADOR   TECHNIQUE: Contiguous axial images were obtained  through the abdomen and pelvis after the administration of  75 mL Omnipaque 350 intravenous contrast.  Positive rectal contrast 30 mL was administered. Coronal and sagittal reformations were made.   FINDINGS: LOWER CHEST: Bibasilar mild peripheral atelectasis/scarring is present.   ABDOMEN:   LIVER: There is irregular lobulation of the hepatic surface contour suggesting cirrhosis. No focal hepatic lesion is seen.   BILE DUCTS: Nondilated.   GALLBLADDER: Surgically absent.   PANCREAS: There is insinuating fat throughout the pancreas. No discrete pancreatic lesion or peripancreatic inflammation is seen.   SPLEEN: Spleen is enlarged measuring 14 cm in craniocaudal dimension. No discrete splenic lesion.   ADRENAL GLANDS: Within normal limits.   KIDNEYS AND URETERS: There is symmetric renal cortical enhancement. Right renal anterior interpolar ovoid 3 cm cyst is present. Left renal interpolar subcentimeter low-attenuation cortical lesion also likely is a tiny cyst. No hydroureteronephrosis bilaterally.   Urinary bladder is partially distended but otherwise unremarkable.   VESSELS: Irregular atherosclerotic calcifications present in the aorta and branch vessels. No aortic aneurysm. IVC is unremarkable.   BOWEL: No bowel obstruction. There has been an appendectomy.   Scattered colonic diverticula are present. Rectal contrast reaches the splenic flexure of the colon. There is indeterminate mild circumferential wall thickening of the proximal-mid sigmoid colon with adjacent mildly prominent rounded mesenteric lymph nodes   There has been a hysterectomy. Vagina contains positive contrast compatible with rectovaginal fistula. There is mild wall thickening of the vagina. There is some ill-defined wall thickening near the anorectal junction which is inseparable from the posterior vaginal wall, most likely the site of fistula.   PERITONEUM/RETROPERITONEUM/LYMPH NODES: No ascites or free air, no fluid collection. Scattered pelvic  phleboliths are present.   No retroperitoneal fluid collection or lymphadenopathy.   ABDOMINAL WALL: Tiny fat containing periumbilical hernia is present without inflammation. There is an additional adjacent small fat containing infraumbilical hernia with mild edema of the herniated fat and trace fluid in the hernia sac. Right lower quadrant small fat containing hernia extends into the abdominal wall musculature without inflammation.   BONE AND SOFT TISSUE: Thoracolumbar mild levoscoliosis is present. Multilevel disc space narrowing and endplate spurring is present throughout the spine. Facet arthrosis is greatest in the mid-lower lumbar spine. Slight anterolisthesis of L4 relative to L5 likely is chronic/degenerative. There is joint space narrowing and marginal spurring of both hips.       Previous hysterectomy. Nonspecific thickening of the vaginal wall. Positive contrast visualized throughout the vaginal canal compatible with rectovaginal fistula. Ill-defined wall thickening near the anorectal junction which is inseparable from the posterior vaginal wall, most likely the site of communication.   Colonic diverticulosis. Indeterminate mild wall thickening of the proximal-mid sigmoid colon could be related to chronic diverticulitis. Colonic neoplasm not excluded.   Cirrhosis.   Portal hypertension changes including splenomegaly and recanalized paraumbilical vein.   MACRO: None.   Signed by: Aiden Casey 1/12/2024 1:23 PM Dictation workstation:   [id number]     Assessment:    Patient is a 73 yr old female with a past medical history of DM, HTN, and licken sclerosus, that presented to the ER for liquid stool coming from vagina for the past 2 days. Patient states she spoke to her PCP in regards to the situation and she recommended patient go to ER. Patient states this has been going on for 2 days in which she goes through 1-2 pads per day. She states she does not have any pain associated. She feels completely fine.  Patient states this has never happened before She states she has had surgery to abdomen in the past that consisted of cholecystectomy, appendectomy, hysterectomy. Patient states she had a colonoscopy 2 yrs ago in which it could not be performed all the way since they could not get scope past 10 inches due to fold or kink. She did do a barium enema and reports nothing showed from this. She reports she had an ABD abscess to the lower portion that smelled but cleaned and used neosporin to which it has healed.     On exam ABD is soft, non tender, non distended. Bowel sounds present in all quadrants. No pain on palpation. CT ABD and pelvis showed contrast visualized throughout the vaginal canal compatible with rectovaginal fistula and Colonic diverticulosis. Patient also looks to have the start of UTI in which ED will treat. Discussed POC with patient and her  who is at bedside and both are in agreement.    Plan:  Follow up with Dr. Kendall James is happy to have patient follow up with him if she can not get in right away with Dr. Argueta so to start the process on ordering imagining.   Patient can be discharged home from general surgery stand point    Further recommendations per Dr. James     Time spent  60  minutes obtaining labs, imaging, recommendations, interview, assessment, examination, medication review/ordering, and EMR review.    Plan of care was discussed extensively with patient. Patient verbalized understanding through teach back method. All questions and concerns addressed upon examination.     Of note, this documentation is completed using the Dragon Dictation system (voice recognition software). There may be spelling and/or grammatical errors that were not corrected prior to final submission.      Electronically signed by JOE Andersen on 1/12/2024 at 3:28 PM     Agree with note above except as noted below    This is a 73-year-old female with a past medical history significant for  endometrial cancer status post hysterectomy over 30 years ago, diabetes on insulin who states she was in her usual state of health until about 2 days ago when she noticed some stool draining from her vagina.  She states this happens now throughout the day requiring the change 1-2 pads per day in order to prevent soilage.  Denies ever having happened before.  Denies any pain.  Denies any blood.  Denies any urinary symptoms.  Patient presented to her PCP who recommended her to go to the emergency room for evaluation.  She was found to be afebrile, vitals stable, Labs were grossly within normal limits.  CT scan with rectal contrast demonstrated a [place]/rectal vaginal fistula.  General surgery was consulted further workup and management.    Patient had a last colonoscopy in 2019.  I am unable to find the exact report, however per the patient, the colonoscope was only able to be advanced a small amount before it had to be aborted due to technical difficulties.  She had a subsequent barium enema and Cologuard which did not demonstrate anything other than some diverticulosis however she has never had a true colonoscopy since that time.    On physical exam the patient is in no acute distress, her abdominal exam shows morbid obesity but no abdominal tenderness.  No hernias palpable.  A rectal and vaginal exam were deferred at this time.    73-year-old female who presents with concern for colovaginal or colorectal fistula.  The CT scan shows compelling evidence of a connection, however it is a little unclear exactly where that connection is.  It does appear to be a connection between the sigmoid colon and the vaginal cuff which would be expected in a woman status post hysterectomy, however there is some blurring of the planes in the mid to distal vagina and is unclear if there is another connection there as well.  The patient is overall hemodynamically stable and does not have any indication that this needs to be dealt with an  urgent matter.  -No need for admission due to her Armando vaginal fistula at this time.  -Given that the patient may have 1 or more connections to the vagina in the setting of a difficult colonoscopy, I feel that she would be best served by seeing a colorectal surgeon.  I recommended Haim Argueta who is a colorectal surgeon at [facility] in the network.  I stated that she likely will need further workup including other radiologic tests and potentially even an exam under anesthesia prior to any sort of surgical intervention.  If the patient is unable to see him or any other colorectal surgeon, I am happy to see her in clinic to help initiate the workup, however if there is any indication this is more complicated than a simple sigmoid fistula to the vaginal cuff then she certainly would need to go to a colorectal surgeon for definitive care.  -If the patient has another reason to be admitted to the hospital, I am happy to see her while she is here.    Iraj James MD  01/12/24  4:35 PM

## 2024-01-12 NOTE — PROGRESS NOTES
Pt called-no fever-chills etc    Stool from vagina x1d -diarrhea  Sent to er  Explained dx possibilities  Needs ct  Agrees to er

## 2024-01-13 LAB — BACTERIA UR CULT: NORMAL

## 2024-01-16 LAB — BACTERIA BLD CULT: NORMAL

## 2024-01-22 ENCOUNTER — TELEPHONE (OUTPATIENT)
Dept: PRIMARY CARE | Facility: CLINIC | Age: 74
End: 2024-01-22
Payer: MEDICARE

## 2024-01-22 DIAGNOSIS — R39.9 UTI SYMPTOMS: ICD-10-CM

## 2024-01-22 NOTE — PROGRESS NOTES
"[facility]    Vanessa Son \"Valerie" is a 73 y.o. female who was recently seen in the ED for stool from her vagina x2 days. CT a/p with contrast visualized throughout the vaginal canal compatible with rectovaginal fistula.     No abdominal pain. The drainage of stool of her vagina started on 1/10/24. She has a history of lichen sclerosis and the drainage causes burning. She is also having air from her vagina that she thinks started before the stool coming out but she is unsure when. She has burning on a daily basis. No pneumaturia. She has been on mostly liquids since her ED visit. She moves her bowels once every 3rd day but has stool from her vagina daily. No hematochezia or melena. She has lost about 30 pounds since this started. No urgency or incontinence prior to this. She wears a pad and changes it about 7-8 times per day. She has a history of endometrial cancer treated with hysterectomy and BSO. No radiation. Drinks 1 glass of wine on occasion. No prior heavy alcohol use.     CT a/p 1/12/24: Previous hysterectomy. Nonspecific thickening of the vaginal wall. Positive contrast visualized throughout the vaginal canal compatible with rectovaginal fistula. Ill-defined wall thickening near the anorectal junction which is inseparable from the posterior vaginal wall, most likely the site of communication. Colonic diverticulosis. Indeterminate mild wall thickening of the proximal-mid sigmoid colon could be related to chronic diverticulitis. Colonic neoplasm not excluded. Cirrhosis. Portal hypertension changes including splenomegaly and recanalized paraumbilical vein.    Colonoscopy 3/2019 ([facility]): Internal grade 1 hemorrhoids. Scoped to 20cm, very tortuous colon and advancement of the scope was very difficult.     Former smoker- quit when she was 26/No ETOH/No Illicit drug use  PMH: endometrial cancer s/p hysterectomy and BSO over 30 years ago, DM, CKD stage 1, HLD, lichen sclerosis  PSH: open appendectomy, open " cholecystectomy, hysterectomy  No family history of CRC or IBD  Employment:     Past Medical History:   Diagnosis Date    History of malignant neoplasm     Hx of mammogram 06/2017    cat 2    Mammogram declined        Past Surgical History:   Procedure Laterality Date    APPENDECTOMY  1963    CATARACT EXTRACTION  2018    CHOLECYSTECTOMY  1974    COLONOSCOPY  2019    Incomplete so Barium Enema + tics only    HYSTERECTOMY  1991    uterine CA limited no rad tx nor chemo    TONSILLECTOMY  1991    TOTAL KNEE ARTHROPLASTY Right 10/2021    TUBAL LIGATION  1977     Current Outpatient Medications   Medication Sig Dispense Refill    amLODIPine (Norvasc) 5 mg tablet Take 1 tablet (5 mg) by mouth once daily. 90 tablet 3    aspirin 81 mg EC tablet Take 1 tablet (81 mg) by mouth once daily.      hydrocortisone 1 % cream Apply topically 2 times a day. 90 g 3    indapamide (Lozol) 1.25 mg tablet Take 1 tablet (1.25 mg) by mouth once daily. 90 tablet 3    insulin lispro (HumaLOG KwikPen Insulin) 100 unit/mL injection Inject 12 Units under the skin 3 times a day with meals. 45 mL 3    Lantus Solostar U-100 Insulin 100 unit/mL (3 mL) pen INJECT SUBCUTANEOUSLY 100 UNITS  DAILY 90 mL 3    losartan-hydrochlorothiazide (Hyzaar) 50-12.5 mg tablet Take 0.5 tablets by mouth once daily. 45 tablet 0    magnesium 250 mg tablet Take by mouth.      metoprolol tartrate (Lopressor) 100 mg tablet Take 1 tablet (100 mg) by mouth 2 times a day. 180 tablet 3    OneTouch Ultra Test strip TEST 3 TIMES DAILY 300 strip 3    pen needle, diabetic (BD ULTRA-FINE MINI PEN NEEDLE MISC)       potassium citrate 99 mg capsule Take by mouth.      Kelsi's wort 300 mg capsule Take 300 mg by mouth.      Victoza 3-Abiel 0.6 mg/0.1 mL (18 mg/3 mL) injection INJECT SUBCUTANEOUSLY 1.8 MG  DAILY 27 mL 3    amoxicillin (Amoxil) 500 mg tablet Take 1 tablet (500 mg) by mouth. 4 Tabs 1 hour prior to procedure      cholecalciferol (Vitamin D-3) 5,000 Units tablet Take by  mouth.      colchicine 0.6 mg tablet TAKE 1 TABLET DAILY AS NEEDED  FOR GOUT 90 tablet 3     No current facility-administered medications for this visit.         Allergies   Allergen Reactions    Atorvastatin Unknown    Enalapril Unknown    Hydroxyzine Unknown    Simvastatin Unknown    Terazosin Unknown       Review of Systems   Constitutional:  Positive for unexpected weight change. Negative for activity change, appetite change, chills, diaphoresis, fatigue and fever.   Respiratory:  Negative for cough, chest tightness and shortness of breath.    Cardiovascular:  Negative for palpitations and leg swelling.   Gastrointestinal:  Negative for abdominal distention, abdominal pain, anal bleeding, diarrhea, nausea, rectal pain and vomiting.   Genitourinary:  Positive for vaginal discharge (Stool and air) and vaginal pain (Burning). Negative for difficulty urinating, dysuria and frequency.   Neurological:  Positive for weakness. Negative for dizziness and light-headedness.   All other systems reviewed and are negative.      Physical Exam  Constitutional:       Appearance: Normal appearance.   HENT:      Head: Normocephalic.   Eyes:      Pupils: Pupils are equal, round, and reactive to light.   Cardiovascular:      Rate and Rhythm: Normal rate and regular rhythm.      Pulses: Normal pulses.      Heart sounds: Normal heart sounds.   Pulmonary:      Effort: Pulmonary effort is normal.      Breath sounds: Normal breath sounds.   Abdominal:      General: There is no distension.      Palpations: Abdomen is soft. There is no mass.      Tenderness: There is no abdominal tenderness.      Hernia: No hernia is present.      Comments: Vertical midline infra-umbilical incisional scar, right lower quadrant transverse incisional scar, right paramedian incisional scar, all well healed.   Musculoskeletal:         General: Normal range of motion.      Cervical back: Normal range of motion.   Skin:     General: Skin is warm and dry.       Capillary Refill: Capillary refill takes less than 2 seconds.   Neurological:      General: No focal deficit present.      Mental Status: She is alert and oriented to person, place, and time. Mental status is at baseline.   Psychiatric:         Mood and Affect: Mood normal.         Behavior: Behavior normal.         Thought Content: Thought content normal.         Judgment: Judgment normal.         Assessment and Plan:   #Rectovaginal fistula; per CT appears to be low, possibly at level of anorectal junction  -  For EUA, insertion of seton; 2/16  -  Patient instructed to start high fiber diet and initiate daily fiber supplementation with metamucil or benefiber  -  Further surgical planning regarding repair options after fistula anatomy has been defined with EUA  -  PAT  -  Lithotomy position in OR    #Cirrhotic appearing liver on imaging  -  Counseled regarding cessation of alcohol intake    Haim Argueta MD   1/31/2024  10:10 AM

## 2024-01-22 NOTE — TELEPHONE ENCOUNTER
Patient phones the office today after being seen at the [facility] on 1-12-24 for a UTI and Rectovaginal Fistula.     Patient was placed on Cefalexin which she has finished and is not feeling any better. Patient is scheduled to see Dr. Haim Argueta on Friday who is a general surgeon.     Patient is concerned that her UTI symptoms are still there and would like to know if she should see CHEO or have another round of medication.     Please advise and contact patient at [phone]    Thank you.

## 2024-01-22 NOTE — H&P (VIEW-ONLY)
"hospitals    Vanessa Son \"Valerie" is a 73 y.o. female who was recently seen in the ED for stool from her vagina x2 days. CT a/p with contrast visualized throughout the vaginal canal compatible with rectovaginal fistula.     No abdominal pain. The drainage of stool of her vagina started on 1/10/24. She has a history of lichen sclerosis and the drainage causes burning. She is also having air from her vagina that she thinks started before the stool coming out but she is unsure when. She has burning on a daily basis. No pneumaturia. She has been on mostly liquids since her ED visit. She moves her bowels once every 3rd day but has stool from her vagina daily. No hematochezia or melena. She has lost about 30 pounds since this started. No urgency or incontinence prior to this. She wears a pad and changes it about 7-8 times per day. She has a history of endometrial cancer treated with hysterectomy and BSO. No radiation. Drinks 1 glass of wine on occasion. No prior heavy alcohol use.     CT a/p 1/12/24: Previous hysterectomy. Nonspecific thickening of the vaginal wall. Positive contrast visualized throughout the vaginal canal compatible with rectovaginal fistula. Ill-defined wall thickening near the anorectal junction which is inseparable from the posterior vaginal wall, most likely the site of communication. Colonic diverticulosis. Indeterminate mild wall thickening of the proximal-mid sigmoid colon could be related to chronic diverticulitis. Colonic neoplasm not excluded. Cirrhosis. Portal hypertension changes including splenomegaly and recanalized paraumbilical vein.    Colonoscopy 3/2019 ([facility]): Internal grade 1 hemorrhoids. Scoped to 20cm, very tortuous colon and advancement of the scope was very difficult.     Former smoker- quit when she was 26/No ETOH/No Illicit drug use  PMH: endometrial cancer s/p hysterectomy and BSO over 30 years ago, DM, CKD stage 1, HLD, lichen sclerosis  PSH: open appendectomy, open " cholecystectomy, hysterectomy  No family history of CRC or IBD  Employment:     Past Medical History:   Diagnosis Date    History of malignant neoplasm     Hx of mammogram 06/2017    cat 2    Mammogram declined        Past Surgical History:   Procedure Laterality Date    APPENDECTOMY  1963    CATARACT EXTRACTION  2018    CHOLECYSTECTOMY  1974    COLONOSCOPY  2019    Incomplete so Barium Enema + tics only    HYSTERECTOMY  1991    uterine CA limited no rad tx nor chemo    TONSILLECTOMY  1991    TOTAL KNEE ARTHROPLASTY Right 10/2021    TUBAL LIGATION  1977     Current Outpatient Medications   Medication Sig Dispense Refill    amLODIPine (Norvasc) 5 mg tablet Take 1 tablet (5 mg) by mouth once daily. 90 tablet 3    aspirin 81 mg EC tablet Take 1 tablet (81 mg) by mouth once daily.      hydrocortisone 1 % cream Apply topically 2 times a day. 90 g 3    indapamide (Lozol) 1.25 mg tablet Take 1 tablet (1.25 mg) by mouth once daily. 90 tablet 3    insulin lispro (HumaLOG KwikPen Insulin) 100 unit/mL injection Inject 12 Units under the skin 3 times a day with meals. 45 mL 3    Lantus Solostar U-100 Insulin 100 unit/mL (3 mL) pen INJECT SUBCUTANEOUSLY 100 UNITS  DAILY 90 mL 3    losartan-hydrochlorothiazide (Hyzaar) 50-12.5 mg tablet Take 0.5 tablets by mouth once daily. 45 tablet 0    magnesium 250 mg tablet Take by mouth.      metoprolol tartrate (Lopressor) 100 mg tablet Take 1 tablet (100 mg) by mouth 2 times a day. 180 tablet 3    OneTouch Ultra Test strip TEST 3 TIMES DAILY 300 strip 3    pen needle, diabetic (BD ULTRA-FINE MINI PEN NEEDLE MISC)       potassium citrate 99 mg capsule Take by mouth.      Kelsi's wort 300 mg capsule Take 300 mg by mouth.      Victoza 3-Abiel 0.6 mg/0.1 mL (18 mg/3 mL) injection INJECT SUBCUTANEOUSLY 1.8 MG  DAILY 27 mL 3    amoxicillin (Amoxil) 500 mg tablet Take 1 tablet (500 mg) by mouth. 4 Tabs 1 hour prior to procedure      cholecalciferol (Vitamin D-3) 5,000 Units tablet Take by  mouth.      colchicine 0.6 mg tablet TAKE 1 TABLET DAILY AS NEEDED  FOR GOUT 90 tablet 3     No current facility-administered medications for this visit.         Allergies   Allergen Reactions    Atorvastatin Unknown    Enalapril Unknown    Hydroxyzine Unknown    Simvastatin Unknown    Terazosin Unknown       Review of Systems   Constitutional:  Positive for unexpected weight change. Negative for activity change, appetite change, chills, diaphoresis, fatigue and fever.   Respiratory:  Negative for cough, chest tightness and shortness of breath.    Cardiovascular:  Negative for palpitations and leg swelling.   Gastrointestinal:  Negative for abdominal distention, abdominal pain, anal bleeding, diarrhea, nausea, rectal pain and vomiting.   Genitourinary:  Positive for vaginal discharge (Stool and air) and vaginal pain (Burning). Negative for difficulty urinating, dysuria and frequency.   Neurological:  Positive for weakness. Negative for dizziness and light-headedness.   All other systems reviewed and are negative.      Physical Exam  Constitutional:       Appearance: Normal appearance.   HENT:      Head: Normocephalic.   Eyes:      Pupils: Pupils are equal, round, and reactive to light.   Cardiovascular:      Rate and Rhythm: Normal rate and regular rhythm.      Pulses: Normal pulses.      Heart sounds: Normal heart sounds.   Pulmonary:      Effort: Pulmonary effort is normal.      Breath sounds: Normal breath sounds.   Abdominal:      General: There is no distension.      Palpations: Abdomen is soft. There is no mass.      Tenderness: There is no abdominal tenderness.      Hernia: No hernia is present.      Comments: Vertical midline infra-umbilical incisional scar, right lower quadrant transverse incisional scar, right paramedian incisional scar, all well healed.   Musculoskeletal:         General: Normal range of motion.      Cervical back: Normal range of motion.   Skin:     General: Skin is warm and dry.       Capillary Refill: Capillary refill takes less than 2 seconds.   Neurological:      General: No focal deficit present.      Mental Status: She is alert and oriented to person, place, and time. Mental status is at baseline.   Psychiatric:         Mood and Affect: Mood normal.         Behavior: Behavior normal.         Thought Content: Thought content normal.         Judgment: Judgment normal.         Assessment and Plan:   #Rectovaginal fistula; per CT appears to be low, possibly at level of anorectal junction  -  For EUA, insertion of seton; 2/16  -  Patient instructed to start high fiber diet and initiate daily fiber supplementation with metamucil or benefiber  -  Further surgical planning regarding repair options after fistula anatomy has been defined with EUA  -  PAT  -  Lithotomy position in OR    #Cirrhotic appearing liver on imaging  -  Counseled regarding cessation of alcohol intake    Haim Argueta MD   1/31/2024  10:10 AM

## 2024-01-31 ENCOUNTER — OFFICE VISIT (OUTPATIENT)
Dept: SURGERY | Facility: CLINIC | Age: 74
End: 2024-01-31
Payer: MEDICARE

## 2024-01-31 VITALS
BODY MASS INDEX: 36.15 KG/M2 | HEIGHT: 65 IN | DIASTOLIC BLOOD PRESSURE: 74 MMHG | TEMPERATURE: 97.4 F | WEIGHT: 217 LBS | HEART RATE: 92 BPM | SYSTOLIC BLOOD PRESSURE: 125 MMHG

## 2024-01-31 DIAGNOSIS — N82.4 COLOVAGINAL FISTULA: ICD-10-CM

## 2024-01-31 DIAGNOSIS — N82.3 RECTOVAGINAL FISTULA: Primary | ICD-10-CM

## 2024-01-31 PROCEDURE — 3066F NEPHROPATHY DOC TX: CPT | Performed by: STUDENT IN AN ORGANIZED HEALTH CARE EDUCATION/TRAINING PROGRAM

## 2024-01-31 PROCEDURE — 1126F AMNT PAIN NOTED NONE PRSNT: CPT | Performed by: STUDENT IN AN ORGANIZED HEALTH CARE EDUCATION/TRAINING PROGRAM

## 2024-01-31 PROCEDURE — 99214 OFFICE O/P EST MOD 30 MIN: CPT | Performed by: STUDENT IN AN ORGANIZED HEALTH CARE EDUCATION/TRAINING PROGRAM

## 2024-01-31 PROCEDURE — 1036F TOBACCO NON-USER: CPT | Performed by: STUDENT IN AN ORGANIZED HEALTH CARE EDUCATION/TRAINING PROGRAM

## 2024-01-31 PROCEDURE — 3078F DIAST BP <80 MM HG: CPT | Performed by: STUDENT IN AN ORGANIZED HEALTH CARE EDUCATION/TRAINING PROGRAM

## 2024-01-31 PROCEDURE — 1159F MED LIST DOCD IN RCRD: CPT | Performed by: STUDENT IN AN ORGANIZED HEALTH CARE EDUCATION/TRAINING PROGRAM

## 2024-01-31 PROCEDURE — 99204 OFFICE O/P NEW MOD 45 MIN: CPT | Performed by: STUDENT IN AN ORGANIZED HEALTH CARE EDUCATION/TRAINING PROGRAM

## 2024-01-31 PROCEDURE — 3074F SYST BP LT 130 MM HG: CPT | Performed by: STUDENT IN AN ORGANIZED HEALTH CARE EDUCATION/TRAINING PROGRAM

## 2024-01-31 PROCEDURE — 3008F BODY MASS INDEX DOCD: CPT | Performed by: STUDENT IN AN ORGANIZED HEALTH CARE EDUCATION/TRAINING PROGRAM

## 2024-01-31 ASSESSMENT — ENCOUNTER SYMPTOMS
PALPITATIONS: 0
COUGH: 0
FATIGUE: 0
VOMITING: 0
FREQUENCY: 0
ABDOMINAL DISTENTION: 0
DIZZINESS: 0
LIGHT-HEADEDNESS: 0
DIFFICULTY URINATING: 0
CHILLS: 0
NAUSEA: 0
CHEST TIGHTNESS: 0
SHORTNESS OF BREATH: 0
APPETITE CHANGE: 0
FEVER: 0
ACTIVITY CHANGE: 0
DIAPHORESIS: 0
DYSURIA: 0
UNEXPECTED WEIGHT CHANGE: 1
ANAL BLEEDING: 0
ABDOMINAL PAIN: 0
WEAKNESS: 1
RECTAL PAIN: 0
DIARRHEA: 0

## 2024-02-09 ENCOUNTER — PRE-ADMISSION TESTING (OUTPATIENT)
Dept: PREADMISSION TESTING | Facility: HOSPITAL | Age: 74
End: 2024-02-09
Payer: MEDICARE

## 2024-02-09 ENCOUNTER — LAB (OUTPATIENT)
Dept: LAB | Facility: LAB | Age: 74
End: 2024-02-09
Payer: MEDICARE

## 2024-02-09 VITALS
RESPIRATION RATE: 16 BRPM | WEIGHT: 219.8 LBS | SYSTOLIC BLOOD PRESSURE: 153 MMHG | TEMPERATURE: 97.2 F | BODY MASS INDEX: 36.62 KG/M2 | HEIGHT: 65 IN | OXYGEN SATURATION: 98 % | HEART RATE: 72 BPM | DIASTOLIC BLOOD PRESSURE: 64 MMHG

## 2024-02-09 DIAGNOSIS — Z01.818 PREOP EXAMINATION: ICD-10-CM

## 2024-02-09 DIAGNOSIS — N82.4 COLOVAGINAL FISTULA: ICD-10-CM

## 2024-02-09 DIAGNOSIS — Z01.818 PREOP EXAMINATION: Primary | ICD-10-CM

## 2024-02-09 LAB
ANION GAP SERPL CALC-SCNC: 11 MMOL/L (ref 10–20)
BUN SERPL-MCNC: 18 MG/DL (ref 6–23)
CALCIUM SERPL-MCNC: 10.7 MG/DL (ref 8.6–10.3)
CHLORIDE SERPL-SCNC: 98 MMOL/L (ref 98–107)
CO2 SERPL-SCNC: 35 MMOL/L (ref 21–32)
CREAT SERPL-MCNC: 0.77 MG/DL (ref 0.5–1.05)
EGFRCR SERPLBLD CKD-EPI 2021: 82 ML/MIN/1.73M*2
GLUCOSE SERPL-MCNC: 106 MG/DL (ref 74–99)
POTASSIUM SERPL-SCNC: 3.9 MMOL/L (ref 3.5–5.3)
SODIUM SERPL-SCNC: 140 MMOL/L (ref 136–145)

## 2024-02-09 PROCEDURE — 99203 OFFICE O/P NEW LOW 30 MIN: CPT

## 2024-02-09 PROCEDURE — 36415 COLL VENOUS BLD VENIPUNCTURE: CPT

## 2024-02-09 PROCEDURE — 80048 BASIC METABOLIC PNL TOTAL CA: CPT

## 2024-02-09 RX ORDER — VIT C/E/ZN/COPPR/LUTEIN/ZEAXAN 250MG-90MG
1 CAPSULE ORAL EVERY EVENING
COMMUNITY

## 2024-02-09 RX ORDER — ACETAMINOPHEN 500 MG
5000 TABLET ORAL DAILY
COMMUNITY

## 2024-02-09 RX ORDER — MAGNESIUM HYDROXIDE 400 MG/5ML
1 SUSPENSION, ORAL (FINAL DOSE FORM) ORAL EVERY EVENING
COMMUNITY

## 2024-02-09 ASSESSMENT — CHADS2 SCORE
CHADS2 SCORE: 2
CHF: NO
PRIOR STROKE OR TIA OR THROMBOEMBOLISM: NO
AGE GREATER THAN OR EQUAL TO 75: NO
DIABETES: YES
HYPERTENSION: YES

## 2024-02-09 ASSESSMENT — DUKE ACTIVITY SCORE INDEX (DASI)
CAN YOU TAKE CARE OF YOURSELF (EAT, DRESS, BATHE, OR USE TOILET): YES
CAN YOU CLIMB A FLIGHT OF STAIRS OR WALK UP A HILL: YES
CAN YOU WALK INDOORS, SUCH AS AROUND YOUR HOUSE: YES
CAN YOU DO MODERATE WORK AROUND THE HOUSE LIKE VACUUMING, SWEEPING FLOORS OR CARRYING GROCERIES: YES
CAN YOU DO YARD WORK LIKE RAKING LEAVES, WEEDING OR PUSHING A MOWER: NO
CAN YOU WALK A BLOCK OR TWO ON LEVEL GROUND: YES
DASI METS SCORE: 5.1
CAN YOU DO HEAVY WORK AROUND THE HOUSE LIKE SCRUBBING FLOORS OR LIFTING AND MOVING HEAVY FURNITURE: NO
CAN YOU HAVE SEXUAL RELATIONS: NO
TOTAL_SCORE: 18.95
CAN YOU DO LIGHT WORK AROUND THE HOUSE LIKE DUSTING OR WASHING DISHES: YES
CAN YOU PARTICIPATE IN STRENOUS SPORTS LIKE SWIMMING, SINGLES TENNIS, FOOTBALL, BASKETBALL, OR SKIING: NO
CAN YOU RUN A SHORT DISTANCE: NO
CAN YOU PARTICIPATE IN MODERATE RECREATIONAL ACTIVITIES LIKE GOLF, BOWLING, DANCING, DOUBLES TENNIS OR THROWING A BASEBALL OR FOOTBALL: NO

## 2024-02-09 ASSESSMENT — LIFESTYLE VARIABLES: SMOKING_STATUS: NONSMOKER

## 2024-02-09 ASSESSMENT — ACTIVITIES OF DAILY LIVING (ADL): ADL_SCORE: 0

## 2024-02-09 NOTE — PREPROCEDURE INSTRUCTIONS
Medication List            Accurate as of February 9, 2024 12:05 PM. Always use your most recent med list.                amLODIPine 5 mg tablet  Commonly known as: Norvasc  Take 1 tablet (5 mg) by mouth once daily.  Medication Adjustments for Surgery: Take morning of surgery with sip of water, no other fluids     aspirin 81 mg EC tablet  Medication Adjustments for Surgery: Stop 7 days before surgery     cholecalciferol 5,000 Units tablet  Commonly known as: Vitamin D-3  Medication Adjustments for Surgery: Stop 7 days before surgery     colchicine 0.6 mg tablet  TAKE 1 TABLET DAILY AS NEEDED  FOR GOUT  Medication Adjustments for Surgery: Other (Comment)  Notes to patient: Coordinate with prescribing provider for further instructions on this medications prior to surgery.     hydrocortisone 1 % cream  Apply topically 2 times a day.  Medication Adjustments for Surgery: Stop 1 day before surgery     indapamide 1.25 mg tablet  Commonly known as: Lozol  Take 1 tablet (1.25 mg) by mouth once daily.  Medication Adjustments for Surgery: Continue until night before surgery     insulin lispro 100 unit/mL injection  Commonly known as: HumaLOG KwikPen Insulin  Inject 12 Units under the skin 3 times a day with meals.  Medication Adjustments for Surgery: Other (Comment)  Notes to patient: Coordinate with prescribing provider for further instructions on this medications prior to surgery.     KELP ORAL  Medication Adjustments for Surgery: Stop 7 days before surgery     Lantus Solostar U-100 Insulin 100 unit/mL (3 mL) pen  Generic drug: insulin glargine  INJECT SUBCUTANEOUSLY 100 UNITS  DAILY  Medication Adjustments for Surgery: Other (Comment)  Notes to patient: Coordinate with prescribing provider for further instructions on this medications prior to surgery.     losartan-hydrochlorothiazide 50-12.5 mg tablet  Commonly known as: Hyzaar  Take 0.5 tablets by mouth once daily.  Medication Adjustments for Surgery: Other  (Comment)  Notes to patient: Please stop 24 hours prior to surgery      magnesium 250 mg tablet  Medication Adjustments for Surgery: Continue until night before surgery     metoprolol tartrate 100 mg tablet  Commonly known as: Lopressor  Take 1 tablet (100 mg) by mouth 2 times a day.  Medication Adjustments for Surgery: Take morning of surgery with sip of water, no other fluids     omega 3-dha-epa-fish oil 450 mg (128 mg- 322 mg)-650 mg capsule,delayed release(DR/EC)  Medication Adjustments for Surgery: Stop 7 days before surgery     OneTouch Ultra Test strip  Generic drug: blood sugar diagnostic  TEST 3 TIMES DAILY     potassium gluconate 595 mg (99 mg) tablet  Medication Adjustments for Surgery: Stop 7 days before surgery     [place]'s wort 300 mg capsule  Medication Adjustments for Surgery: Stop 7 days before surgery     Victoza 3-Abiel 0.6 mg/0.1 mL (18 mg/3 mL) injection  Generic drug: liraglutide  INJECT SUBCUTANEOUSLY 1.8 MG  DAILY  Medication Adjustments for Surgery: Continue until night before surgery                          PRE-OPERATIVE INSTRUCTIONS    You will receive notification one business day prior to your surgery to confirm your arrival time and additional information. It is important that you answer your phone and/or check your messages during this time.    Please enter the building through the Outpatient entrance. Take the elevator off the lobby to the 2nd floor and check in at the Outpatient Surgery desk    INSTRUCTIONS:  Talk to your surgeon for instructions if you should stop your aspirin, blood thinner, or diabetes medicines.  DO NOT take any multivitamins or over the counter supplements for 7-10 days before surgery.  If not being admitted, you must have an adult immediately available to drive you home after surgery. We also highly recommend you have someone stay with you for the entire day and night of your surgery.  For children having surgery, a parent or legal guardian must accompany them  to the surgery center. If this is not possible, please call [phone] to make additional arrangements.  For adults who are unable to consent or make medical decisions for themselves, a legal guardian or Power of  must accompany them to the surgery center. If this is not possible, please call [phone] to make additional arrangements.  Wear comfortable, loose fitting clothing.  All jewelry and piercings must be removed. If you are unable to remove an item or have a dermal piercing, please be sure to tell the nurse when you arrive for surgery.  Nail polish and make-up must be removed.  Avoid smoking or consuming alcohol for 24 hours before surgery.  To help prevent infection, please take a shower/bath and wash your hair the night before and/or morning of surgery.    Additional instructions about eating and drinking before surgery:  Do not eat any solid foods after midnight. Milk, nutritional drinks/supplements, and infant formula are considered solid foods.  You may drink up to 12 oz. of clear liquids up to 2 hours before your arrival time for surgery, unless directed otherwise by your surgeon. Clear liquids include water, non-carbonated sports drinks (Gatorade), black tea or coffee (no creamers) and breast milk.    If you received a blue folder, please review additional information provided inside the folder regarding additional preparation.     If you have any questions or concerns, please call Pre-Admission Testing at [phone].                    Preoperative Bathing instructions    Follow these instructions the evening before and morning of surgery:  Do not shave the day before or day of surgery.  Remove all jewelry until after surgery. Take off rings and take out all body-piercing jewelry.  Use a clean wash cloth and towel.  Wash your face and hair with your normal soap and shampoo before you use the CHG soap.  Use a wash cloth to clean your skin with the CHG soap. Use enough CHG soap to cover  the skin on your whole body. Use the same amount as you would with your normal soap.  Do not use the CHG soap on your face, eyes, ears, or head.  Do not scrub your skin too hard.  Be sure to clean the area well where surgery will be done.  Do not wash with your normal soap after the CHG soap.  Keep away from the water stream when you put CHG soap on. This keeps it from rinsing off too soon.  Rinse your body well.  Pat yourself dry with a clean, soft towel.  Put on clean clothing.  Do not put on any deodorants, lotions, or oils after showering. These might block how the CHG soap works.
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
## 2024-02-11 ENCOUNTER — PREP FOR PROCEDURE (OUTPATIENT)
Dept: SURGERY | Facility: HOSPITAL | Age: 74
End: 2024-02-11
Payer: MEDICARE

## 2024-02-11 RX ORDER — METRONIDAZOLE 500 MG/100ML
500 INJECTION, SOLUTION INTRAVENOUS ONCE
Status: CANCELLED | OUTPATIENT
Start: 2024-02-11 | End: 2024-02-11

## 2024-02-11 RX ORDER — SODIUM CHLORIDE, SODIUM LACTATE, POTASSIUM CHLORIDE, CALCIUM CHLORIDE 600; 310; 30; 20 MG/100ML; MG/100ML; MG/100ML; MG/100ML
100 INJECTION, SOLUTION INTRAVENOUS CONTINUOUS
Status: CANCELLED | OUTPATIENT
Start: 2024-02-11

## 2024-02-21 ENCOUNTER — PREP FOR PROCEDURE (OUTPATIENT)
Dept: SURGERY | Facility: HOSPITAL | Age: 74
End: 2024-02-21
Payer: MEDICARE

## 2024-02-23 ENCOUNTER — HOSPITAL ENCOUNTER (OUTPATIENT)
Facility: HOSPITAL | Age: 74
Setting detail: OUTPATIENT SURGERY
Discharge: HOME | End: 2024-02-23
Attending: STUDENT IN AN ORGANIZED HEALTH CARE EDUCATION/TRAINING PROGRAM | Admitting: STUDENT IN AN ORGANIZED HEALTH CARE EDUCATION/TRAINING PROGRAM
Payer: MEDICARE

## 2024-02-23 ENCOUNTER — ANESTHESIA EVENT (OUTPATIENT)
Dept: OPERATING ROOM | Facility: HOSPITAL | Age: 74
End: 2024-02-23
Payer: MEDICARE

## 2024-02-23 ENCOUNTER — ANESTHESIA (OUTPATIENT)
Dept: OPERATING ROOM | Facility: HOSPITAL | Age: 74
End: 2024-02-23
Payer: MEDICARE

## 2024-02-23 VITALS
WEIGHT: 213 LBS | HEIGHT: 65 IN | SYSTOLIC BLOOD PRESSURE: 149 MMHG | HEART RATE: 77 BPM | TEMPERATURE: 96.8 F | OXYGEN SATURATION: 97 % | BODY MASS INDEX: 35.49 KG/M2 | RESPIRATION RATE: 16 BRPM | DIASTOLIC BLOOD PRESSURE: 72 MMHG

## 2024-02-23 DIAGNOSIS — N82.4 COLOVAGINAL FISTULA: Primary | ICD-10-CM

## 2024-02-23 LAB
GLUCOSE BLD MANUAL STRIP-MCNC: 168 MG/DL (ref 74–99)
GLUCOSE BLD MANUAL STRIP-MCNC: 172 MG/DL (ref 74–99)
GLUCOSE BLD MANUAL STRIP-MCNC: 95 MG/DL (ref 74–99)

## 2024-02-23 PROCEDURE — 57452 EXAM OF CERVIX W/SCOPE: CPT | Performed by: STUDENT IN AN ORGANIZED HEALTH CARE EDUCATION/TRAINING PROGRAM

## 2024-02-23 PROCEDURE — 7100000002 HC RECOVERY ROOM TIME - EACH INCREMENTAL 1 MINUTE: Performed by: STUDENT IN AN ORGANIZED HEALTH CARE EDUCATION/TRAINING PROGRAM

## 2024-02-23 PROCEDURE — 99100 ANES PT EXTEME AGE<1 YR&>70: CPT | Performed by: ANESTHESIOLOGY

## 2024-02-23 PROCEDURE — 3600000002 HC OR TIME - INITIAL BASE CHARGE - PROCEDURE LEVEL TWO: Performed by: STUDENT IN AN ORGANIZED HEALTH CARE EDUCATION/TRAINING PROGRAM

## 2024-02-23 PROCEDURE — 7100000010 HC PHASE TWO TIME - EACH INCREMENTAL 1 MINUTE: Performed by: STUDENT IN AN ORGANIZED HEALTH CARE EDUCATION/TRAINING PROGRAM

## 2024-02-23 PROCEDURE — 2500000004 HC RX 250 GENERAL PHARMACY W/ HCPCS (ALT 636 FOR OP/ED): Performed by: ANESTHESIOLOGIST ASSISTANT

## 2024-02-23 PROCEDURE — A57452 PR COLPOSCOPY,CERVIX W/ADJ VAGINA: Performed by: ANESTHESIOLOGIST ASSISTANT

## 2024-02-23 PROCEDURE — 45341 SIGMOIDOSCOPY W/ULTRASOUND: CPT | Performed by: STUDENT IN AN ORGANIZED HEALTH CARE EDUCATION/TRAINING PROGRAM

## 2024-02-23 PROCEDURE — 2720000007 HC OR 272 NO HCPCS: Performed by: STUDENT IN AN ORGANIZED HEALTH CARE EDUCATION/TRAINING PROGRAM

## 2024-02-23 PROCEDURE — 7100000009 HC PHASE TWO TIME - INITIAL BASE CHARGE: Performed by: STUDENT IN AN ORGANIZED HEALTH CARE EDUCATION/TRAINING PROGRAM

## 2024-02-23 PROCEDURE — 2500000005 HC RX 250 GENERAL PHARMACY W/O HCPCS: Performed by: ANESTHESIOLOGIST ASSISTANT

## 2024-02-23 PROCEDURE — 3600000007 HC OR TIME - EACH INCREMENTAL 1 MINUTE - PROCEDURE LEVEL TWO: Performed by: STUDENT IN AN ORGANIZED HEALTH CARE EDUCATION/TRAINING PROGRAM

## 2024-02-23 PROCEDURE — 2500000004 HC RX 250 GENERAL PHARMACY W/ HCPCS (ALT 636 FOR OP/ED): Performed by: STUDENT IN AN ORGANIZED HEALTH CARE EDUCATION/TRAINING PROGRAM

## 2024-02-23 PROCEDURE — A57452 PR COLPOSCOPY,CERVIX W/ADJ VAGINA: Performed by: ANESTHESIOLOGY

## 2024-02-23 PROCEDURE — 3700000001 HC GENERAL ANESTHESIA TIME - INITIAL BASE CHARGE: Performed by: STUDENT IN AN ORGANIZED HEALTH CARE EDUCATION/TRAINING PROGRAM

## 2024-02-23 PROCEDURE — 7100000001 HC RECOVERY ROOM TIME - INITIAL BASE CHARGE: Performed by: STUDENT IN AN ORGANIZED HEALTH CARE EDUCATION/TRAINING PROGRAM

## 2024-02-23 PROCEDURE — 82947 ASSAY GLUCOSE BLOOD QUANT: CPT

## 2024-02-23 PROCEDURE — 3700000002 HC GENERAL ANESTHESIA TIME - EACH INCREMENTAL 1 MINUTE: Performed by: STUDENT IN AN ORGANIZED HEALTH CARE EDUCATION/TRAINING PROGRAM

## 2024-02-23 RX ORDER — ONDANSETRON HYDROCHLORIDE 2 MG/ML
4 INJECTION, SOLUTION INTRAVENOUS ONCE AS NEEDED
Status: COMPLETED | OUTPATIENT
Start: 2024-02-23 | End: 2024-02-23

## 2024-02-23 RX ORDER — MIDAZOLAM HYDROCHLORIDE 1 MG/ML
INJECTION, SOLUTION INTRAMUSCULAR; INTRAVENOUS AS NEEDED
Status: DISCONTINUED | OUTPATIENT
Start: 2024-02-23 | End: 2024-02-23

## 2024-02-23 RX ORDER — LABETALOL HYDROCHLORIDE 5 MG/ML
5 INJECTION, SOLUTION INTRAVENOUS ONCE AS NEEDED
Status: DISCONTINUED | OUTPATIENT
Start: 2024-02-23 | End: 2024-02-23 | Stop reason: HOSPADM

## 2024-02-23 RX ORDER — FENTANYL CITRATE 50 UG/ML
25 INJECTION, SOLUTION INTRAMUSCULAR; INTRAVENOUS EVERY 5 MIN PRN
Status: DISCONTINUED | OUTPATIENT
Start: 2024-02-23 | End: 2024-02-23 | Stop reason: HOSPADM

## 2024-02-23 RX ORDER — ALBUTEROL SULFATE 0.83 MG/ML
2.5 SOLUTION RESPIRATORY (INHALATION) ONCE AS NEEDED
Status: DISCONTINUED | OUTPATIENT
Start: 2024-02-23 | End: 2024-02-23 | Stop reason: HOSPADM

## 2024-02-23 RX ORDER — SODIUM CHLORIDE, SODIUM LACTATE, POTASSIUM CHLORIDE, CALCIUM CHLORIDE 600; 310; 30; 20 MG/100ML; MG/100ML; MG/100ML; MG/100ML
100 INJECTION, SOLUTION INTRAVENOUS CONTINUOUS
Status: DISCONTINUED | OUTPATIENT
Start: 2024-02-23 | End: 2024-02-23 | Stop reason: HOSPADM

## 2024-02-23 RX ORDER — PROPOFOL 10 MG/ML
INJECTION, EMULSION INTRAVENOUS AS NEEDED
Status: DISCONTINUED | OUTPATIENT
Start: 2024-02-23 | End: 2024-02-23

## 2024-02-23 RX ORDER — METRONIDAZOLE 500 MG/100ML
500 INJECTION, SOLUTION INTRAVENOUS ONCE
Status: COMPLETED | OUTPATIENT
Start: 2024-02-23 | End: 2024-02-23

## 2024-02-23 RX ORDER — ONDANSETRON HYDROCHLORIDE 2 MG/ML
INJECTION, SOLUTION INTRAVENOUS AS NEEDED
Status: DISCONTINUED | OUTPATIENT
Start: 2024-02-23 | End: 2024-02-23

## 2024-02-23 RX ORDER — LIDOCAINE HYDROCHLORIDE 10 MG/ML
0.1 INJECTION, SOLUTION EPIDURAL; INFILTRATION; INTRACAUDAL; PERINEURAL ONCE
Status: DISCONTINUED | OUTPATIENT
Start: 2024-02-23 | End: 2024-02-23 | Stop reason: HOSPADM

## 2024-02-23 RX ORDER — MEPERIDINE HYDROCHLORIDE 50 MG/ML
12.5 INJECTION INTRAMUSCULAR; INTRAVENOUS; SUBCUTANEOUS EVERY 10 MIN PRN
Status: DISCONTINUED | OUTPATIENT
Start: 2024-02-23 | End: 2024-02-23 | Stop reason: HOSPADM

## 2024-02-23 RX ORDER — OXYCODONE HYDROCHLORIDE 5 MG/1
5 TABLET ORAL EVERY 4 HOURS PRN
Status: DISCONTINUED | OUTPATIENT
Start: 2024-02-23 | End: 2024-02-23 | Stop reason: HOSPADM

## 2024-02-23 RX ORDER — CEFAZOLIN SODIUM 2 G/100ML
2 INJECTION, SOLUTION INTRAVENOUS ONCE
Status: COMPLETED | OUTPATIENT
Start: 2024-02-23 | End: 2024-02-23

## 2024-02-23 RX ORDER — HYDROMORPHONE HYDROCHLORIDE 1 MG/ML
0.5 INJECTION, SOLUTION INTRAMUSCULAR; INTRAVENOUS; SUBCUTANEOUS EVERY 5 MIN PRN
Status: DISCONTINUED | OUTPATIENT
Start: 2024-02-23 | End: 2024-02-23 | Stop reason: HOSPADM

## 2024-02-23 RX ORDER — HYDROMORPHONE HYDROCHLORIDE 1 MG/ML
INJECTION, SOLUTION INTRAMUSCULAR; INTRAVENOUS; SUBCUTANEOUS AS NEEDED
Status: DISCONTINUED | OUTPATIENT
Start: 2024-02-23 | End: 2024-02-23

## 2024-02-23 RX ORDER — DEXAMETHASONE SODIUM PHOSPHATE 4 MG/ML
INJECTION, SOLUTION INTRA-ARTICULAR; INTRALESIONAL; INTRAMUSCULAR; INTRAVENOUS; SOFT TISSUE AS NEEDED
Status: DISCONTINUED | OUTPATIENT
Start: 2024-02-23 | End: 2024-02-23

## 2024-02-23 RX ORDER — METOCLOPRAMIDE HYDROCHLORIDE 5 MG/ML
INJECTION INTRAMUSCULAR; INTRAVENOUS AS NEEDED
Status: DISCONTINUED | OUTPATIENT
Start: 2024-02-23 | End: 2024-02-23

## 2024-02-23 RX ORDER — SUCCINYLCHOLINE CHLORIDE 20 MG/ML
INJECTION INTRAMUSCULAR; INTRAVENOUS AS NEEDED
Status: DISCONTINUED | OUTPATIENT
Start: 2024-02-23 | End: 2024-02-23

## 2024-02-23 RX ORDER — LIDOCAINE HYDROCHLORIDE 20 MG/ML
INJECTION, SOLUTION EPIDURAL; INFILTRATION; INTRACAUDAL; PERINEURAL AS NEEDED
Status: DISCONTINUED | OUTPATIENT
Start: 2024-02-23 | End: 2024-02-23

## 2024-02-23 RX ORDER — FENTANYL CITRATE 50 UG/ML
INJECTION, SOLUTION INTRAMUSCULAR; INTRAVENOUS AS NEEDED
Status: DISCONTINUED | OUTPATIENT
Start: 2024-02-23 | End: 2024-02-23

## 2024-02-23 RX ADMIN — SUCCINYLCHOLINE CHLORIDE 100 MG: 20 INJECTION, SOLUTION INTRAMUSCULAR; INTRAVENOUS at 15:36

## 2024-02-23 RX ADMIN — FENTANYL CITRATE 25 MCG: 50 INJECTION, SOLUTION INTRAMUSCULAR; INTRAVENOUS at 15:46

## 2024-02-23 RX ADMIN — METOCLOPRAMIDE 10 MG: 5 INJECTION, SOLUTION INTRAMUSCULAR; INTRAVENOUS at 15:29

## 2024-02-23 RX ADMIN — METRONIDAZOLE 500 MG: 500 INJECTION, SOLUTION INTRAVENOUS at 15:46

## 2024-02-23 RX ADMIN — ONDANSETRON 4 MG: 2 INJECTION INTRAMUSCULAR; INTRAVENOUS at 17:50

## 2024-02-23 RX ADMIN — PROPOFOL 50 MG: 10 INJECTION, EMULSION INTRAVENOUS at 15:55

## 2024-02-23 RX ADMIN — SODIUM CHLORIDE, POTASSIUM CHLORIDE, SODIUM LACTATE AND CALCIUM CHLORIDE: 600; 310; 30; 20 INJECTION, SOLUTION INTRAVENOUS at 15:29

## 2024-02-23 RX ADMIN — FENTANYL CITRATE 25 MCG: 50 INJECTION, SOLUTION INTRAMUSCULAR; INTRAVENOUS at 15:36

## 2024-02-23 RX ADMIN — LIDOCAINE HYDROCHLORIDE 100 MG: 20 INJECTION, SOLUTION EPIDURAL; INFILTRATION; INTRACAUDAL; PERINEURAL at 15:36

## 2024-02-23 RX ADMIN — PROMETHAZINE HYDROCHLORIDE 6.25 MG: 25 INJECTION INTRAMUSCULAR; INTRAVENOUS at 18:52

## 2024-02-23 RX ADMIN — HYDROMORPHONE HYDROCHLORIDE 0.5 MG: 1 INJECTION, SOLUTION INTRAMUSCULAR; INTRAVENOUS; SUBCUTANEOUS at 16:10

## 2024-02-23 RX ADMIN — HYDROMORPHONE HYDROCHLORIDE 0.5 MG: 1 INJECTION, SOLUTION INTRAMUSCULAR; INTRAVENOUS; SUBCUTANEOUS at 16:07

## 2024-02-23 RX ADMIN — MIDAZOLAM 2 MG: 1 INJECTION INTRAMUSCULAR; INTRAVENOUS at 15:29

## 2024-02-23 RX ADMIN — FENTANYL CITRATE 50 MCG: 50 INJECTION, SOLUTION INTRAMUSCULAR; INTRAVENOUS at 15:54

## 2024-02-23 RX ADMIN — CEFAZOLIN SODIUM 2 G: 2 INJECTION, SOLUTION INTRAVENOUS at 15:40

## 2024-02-23 RX ADMIN — DEXAMETHASONE SODIUM PHOSPHATE 4 MG: 4 INJECTION, SOLUTION INTRAMUSCULAR; INTRAVENOUS at 15:40

## 2024-02-23 RX ADMIN — PROPOFOL 150 MG: 10 INJECTION, EMULSION INTRAVENOUS at 15:36

## 2024-02-23 RX ADMIN — ONDANSETRON 4 MG: 2 INJECTION, SOLUTION INTRAMUSCULAR; INTRAVENOUS at 15:42

## 2024-02-23 SDOH — HEALTH STABILITY: MENTAL HEALTH: CURRENT SMOKER: 0

## 2024-02-23 ASSESSMENT — PAIN - FUNCTIONAL ASSESSMENT
PAIN_FUNCTIONAL_ASSESSMENT: 0-10

## 2024-02-23 ASSESSMENT — COLUMBIA-SUICIDE SEVERITY RATING SCALE - C-SSRS
1. IN THE PAST MONTH, HAVE YOU WISHED YOU WERE DEAD OR WISHED YOU COULD GO TO SLEEP AND NOT WAKE UP?: NO
2. HAVE YOU ACTUALLY HAD ANY THOUGHTS OF KILLING YOURSELF?: NO
6. HAVE YOU EVER DONE ANYTHING, STARTED TO DO ANYTHING, OR PREPARED TO DO ANYTHING TO END YOUR LIFE?: NO

## 2024-02-23 ASSESSMENT — PAIN SCALES - GENERAL
PAINLEVEL_OUTOF10: 0 - NO PAIN
PAINLEVEL_OUTOF10: 3
PAINLEVEL_OUTOF10: 0 - NO PAIN
PAINLEVEL_OUTOF10: 0 - NO PAIN
PAINLEVEL_OUTOF10: 2
PAIN_LEVEL: 0
PAINLEVEL_OUTOF10: 0 - NO PAIN

## 2024-02-23 ASSESSMENT — PAIN DESCRIPTION - DESCRIPTORS: DESCRIPTORS: DISCOMFORT

## 2024-02-23 NOTE — BRIEF OP NOTE
"Date: 2024  OR Location: [facility] OR    Name: Vanessa Son \"Valerie", : 1950, Age: 74 y.o., MRN: [id number], Sex: female    Diagnosis  Pre-op Diagnosis     * Colovaginal fistula [N82.4] Post-op Diagnosis     * Colovaginal fistula [N82.4]     Procedures  Exam under anesthesia, anorectal  Flexible sigmoidoscopy  Vaginoscopy    Surgeons      * Haim Argueta - Primary    Resident/Fellow/Other Assistant:  Surgeon(s) and Role:  Chloe Yung MD - Fellow    Procedure Summary  Anesthesia: General  ASA: III  Anesthesia Staff: Anesthesiologist: Mirta Castañeda MD  C-AA: REYNA Bridges  Estimated Blood Loss: 0mL  Intra-op Medications: Administrations occurring from 1550 to 1720 on 24:  * No intraprocedure medications in log *           Anesthesia Record               Intraprocedure I/O Totals       None           Specimen: No specimens collected     Staff:   Circulator: Brielle Magaña RN          Findings: Colovaginal fistula; vaginal fistula sinus appears to be at vaginal cuff (high).    Complications:  None; patient tolerated the procedure well.     Disposition: PACU - hemodynamically stable.  Condition: stable  Specimens Collected: No specimens collected  Attending Attestation: I was present and scrubbed for the entire procedure.    Haim Argueta  Phone Number: [phone]  "

## 2024-02-23 NOTE — DISCHARGE INSTRUCTIONS
Use diaper cream daily around external vagina to protect skin.    Call office at [phone] to schedule follow-up appointment in 1-2 weeks.    You will need an MRI to further evaluate and confirm suspected location of colovaginal fistula.

## 2024-02-23 NOTE — ANESTHESIA POSTPROCEDURE EVALUATION
"Patient: Vanessa Son \"Jodie\"    Procedure Summary       Date: 02/23/24 Room / Location: [facility] OR 06 / [facility] OR    Anesthesia Start: 1529 Anesthesia Stop:     Procedure: EUA, FLEXIBLE SIGMOIDOSCOPY Diagnosis:       Colovaginal fistula      (Colovaginal fistula [N82.4])    Surgeons: Haim Argueta MD Responsible Provider: Mirta Castañeda MD    Anesthesia Type: general ASA Status: 3            Anesthesia Type: general    Vitals Value Taken Time   /86 02/23/24 1650   Temp 36 02/23/24 1650   Pulse 75 02/23/24 1650   Resp 17 02/23/24 1650   SpO2 96 02/23/24 1650       Anesthesia Post Evaluation    Patient location during evaluation: PACU  Patient participation: complete - patient cannot participate  Level of consciousness: sleepy but conscious  Pain score: 0  Pain management: adequate  Multimodal analgesia pain management approach  Airway patency: patent  Two or more strategies used to mitigate risk of obstructive sleep apnea  Cardiovascular status: acceptable and stable  Respiratory status: acceptable and room air  Hydration status: acceptable  Postoperative Nausea and Vomiting: none        No notable events documented.    "

## 2024-02-23 NOTE — OP NOTE
"EUA, FLEXIBLE SIGMOIDOSCOPY Operative Note     Date: 2024  OR Location: [facility] OR    Name: Vanessa Son \"Valerie", : 1950, Age: 74 y.o., MRN: [id number], Sex: female    Diagnosis  Pre-op Diagnosis     * Colovaginal fistula [N82.4] Post-op Diagnosis     * Colovaginal fistula [N82.4]     Procedures  Exam under anesthesia, anorectal  Flexible sigmoidoscopy  Vaginoscopy    Surgeons      * Haim Argueta - Primary    Resident/Fellow/Other Assistant:  Surgeon(s) and Role:  Chloe Yung MD - [place]    Procedure Summary  Anesthesia: General  ASA: III  Anesthesia Staff: Anesthesiologist: Mirta Castañeda MD  C-AA: REYNA Bridges  Estimated Blood Loss: 10mL  Intra-op Medications: Administrations occurring from 1550 to 1720 on 24:  * No intraprocedure medications in log *           Anesthesia Record               Intraprocedure I/O Totals       None           Specimen: No specimens collected     Staff:   Circulator: Brielle Magaña RN         Drains and/or Catheters: * None in log *    Tourniquet Times:         Implants:     Findings: Colovaginal fistula; vaginal fistula sinus appears to be at vaginal cuff (high).    Indications: Jodie Son is an 74 y.o. female who is having surgery for Colovaginal fistula [N82.4].     The patient was seen in the preoperative area. The risks, benefits, complications, treatment options, non-operative alternatives, expected recovery and outcomes were discussed with the patient. The possibilities of reaction to medication, pulmonary aspiration, injury to surrounding structures, bleeding, recurrent infection, the need for additional procedures, failure to diagnose a condition, and creating a complication requiring transfusion or operation were discussed with the patient. The patient concurred with the proposed plan, giving informed consent.  The site of surgery was properly noted/marked if necessary per policy. The patient has been actively warmed in preoperative area. " Preoperative antibiotics have been ordered and given within 1 hours of incision. Venous thrombosis prophylaxis have been ordered including bilateral sequential compression devices    Procedure Details: Safety checklist was performed in the preoperative area.  The patient was brought to the operating room.  Sequential compression devices were applied to bilateral lower extremities.  Following induction of general anesthesia, the patient was placed in lithotomy position using the yellow-fin stirrups.  The patient's arms were abducted and gently fixed to arm boards.  The perineum, vaginal canal, and perianal region were prepped with betadine solution and the patient draped in the usual sterile fashion.  A time out was performed, once again confirming correct patient and correct procedure.      Following completion of perioperative antibiotics, a perineal and perianal exam was performed.  There was stool emanating from the vagina.  There was extensive erythema and excoriations of the vaginal introitus.  There were non-thrombosed external hemorrhoids.  A well-lubricated index finger was inserted into the anorectal canal and a 360 degree fiber sweep of the anorectal canal performed.  Small pieces of solid stool were removed.  There were no palpable lesions or masses.  There was no blood on exam.  A well-lubricated index finger was inserted into the vagina.  There was some nodularity to the posterior wall of the distal vaginal canal.  Palpation of the rectovaginal septum was performed and noted to be thin.      A well-lubricated [place] retractor was now inserted into the anorectal canal.  Another 360 degree exam was performed.  Aside from small-moderate internal hemorrhoids, there were no other mucosal abnormalities.  A separate [place] retractor was inserted into the vaginal canal.  To improve visualization, this was exchanged for a bivalve vaginal retractor.  Close examination of the nodularity in the  posterior distal vaginal canal demonstrated no obvious fistula sinus.  The retractor that was inserted into the anorectal canal was now removed.  The vagina was filled with normal saline.  Using a bulb asepto syringe, air was insufflated into the anorectal canal while examining the vaginal canal for bubbles.  While some bubbles were noted within the vaginal canal, the precise location could not be determined.      A flexible sigmoidoscopy was now performed.  The sigmoidoscope was introduced into the anus and advanced proximally towards the rectosigmoid.  Insufflation with the sigmoidoscope was now able to demonstrate bubbling of air emanating from the superior vaginal canal.  The sigmoidoscope was removed from the anus, cleansed and inserted into the vagina to perform vaginoscopy.  At the dome of the vaginal canal, a small sinus was identified with clear identification of feculent debris embedded within the sinus.  At this point, the endoscope was removed from the vagina.      A surgical count was performed and confirmed to be correct.  The vaginal canal, perineum and perianal region were cleansed with normal saline and dried.  A sign out was performed.  The patient was awakened and taken to the recovery area in stable condition.    Complications:  None; patient tolerated the procedure well.    Disposition: PACU - hemodynamically stable.  Condition: stable         Additional Details: N/A    Attending Attestation: I was present and scrubbed for the entire procedure.    Haim Argueta  Phone Number: [phone]

## 2024-02-23 NOTE — INTERVAL H&P NOTE
H&P reviewed. The patient was examined and there are no changes to the H&P.   Please call her to ask when she started having symptoms and what are the symptoms. Then I can place the order in epic and she can go to the drive thru at [facility] from 6am -10 am monday thru friday.

## 2024-02-23 NOTE — ANESTHESIA PREPROCEDURE EVALUATION
"Patient: Vanessa Son \"Jodie\"    Procedure Information       Date/Time: 02/23/24 1550    Procedure: EUA, possible seton insertion    Location: [facility] OR 06 / [facility] OR    Surgeons: Haim Argueta MD          Vitals:    02/23/24 1330   BP: 143/73   Pulse: 77   Resp: 16   Temp: 36.4 °C (97.5 °F)   SpO2: 98%       Past Surgical History:   Procedure Laterality Date    APPENDECTOMY  1963    CATARACT EXTRACTION  2018    CHOLECYSTECTOMY  1974    COLONOSCOPY  2019    Incomplete so Barium Enema + tics only    HYSTERECTOMY  1991    uterine CA limited no rad tx nor chemo    JOINT REPLACEMENT      TONSILLECTOMY  1991    TOTAL KNEE ARTHROPLASTY Right 10/2021    TUBAL LIGATION  1977     Past Medical History:   Diagnosis Date    Adverse effect of anesthesia     Anxiety     Arthritis     Cataract     CKD (chronic kidney disease)     Delayed emergence from general anesthesia     Depression     Diabetes mellitus (CMS/HCC)     Diverticulosis     Fractures     GERD (gastroesophageal reflux disease)     Gout     History of blood transfusion     History of malignant neoplasm     Hx of mammogram 06/2017    cat 2    Hyperlipidemia     Hypertension     Lichen sclerosus     Lumbar disc disease     Mammogram declined     PONV (postoperative nausea and vomiting)     Uterine cancer (CMS/HCC)        Current Facility-Administered Medications:     ceFAZolin in dextrose (iso-os) (Ancef) IVPB 2 g, 2 g, intravenous, Once, Haim Argueta MD    lactated Ringer's infusion, 100 mL/hr, intravenous, Continuous, Haim Argueta MD    metroNIDAZOLE (Flagyl) 500 mg in NaCl (iso-os) 100 mL, 500 mg, intravenous, Once, Haim Argueta MD  Prior to Admission medications    Medication Sig Start Date End Date Taking? Authorizing Provider   amLODIPine (Norvasc) 5 mg tablet Take 1 tablet (5 mg) by mouth once daily. 8/18/23  Yes Lalitha Ramirez MD   aspirin 81 mg EC tablet Take 1 tablet (81 mg) by mouth once daily. 5/6/19  Yes Historical Provider, MD "   cholecalciferol (Vitamin D-3) 5,000 Units tablet Take 1 tablet (5,000 Units) by mouth once daily.   Yes Historical Provider, MD   colchicine 0.6 mg tablet TAKE 1 TABLET DAILY AS NEEDED  FOR GOUT 8/21/23  Yes Lalitha Ramirez MD   hydrocortisone 1 % cream Apply topically 2 times a day. 8/14/23  Yes Lalitha Ramirez MD   indapamide (Lozol) 1.25 mg tablet Take 1 tablet (1.25 mg) by mouth once daily.  Patient taking differently: Take 1 tablet (1.25 mg) by mouth once daily in the evening. 8/18/23  Yes Lalitha Ramirez MD   insulin lispro (HumaLOG KwikPen Insulin) 100 unit/mL injection Inject 12 Units under the skin 3 times a day with meals. 10/13/23  Yes Lalitha Ramirez MD   KELP ORAL Take 1 capsule by mouth once daily in the evening. (Brown Seaweed Caps)   Yes Historical Provider, MD   Lananujus Solostar U-100 Insulin 100 unit/mL (3 mL) pen INJECT SUBCUTANEOUSLY 100 UNITS  DAILY  Patient taking differently: Inject 80 Units under the skin once daily at bedtime. 8/10/23  Yes Lalitha Raimrez MD   losartan-hydrochlorothiazide (Hyzaar) 50-12.5 mg tablet Take 0.5 tablets by mouth once daily.  Patient taking differently: Take 0.5 tablets by mouth once daily in the evening. 10/9/23  Yes Lalitha Ramirez MD   magnesium 250 mg tablet Take 1 tablet (250 mg) by mouth once daily in the evening.   Yes Historical Provider, MD   metoprolol tartrate (Lopressor) 100 mg tablet Take 1 tablet (100 mg) by mouth 2 times a day. 8/18/23  Yes Lalitha Ramirez MD   omega 3-dha-epa-fish oil 450 mg (128 mg- 322 mg)-650 mg capsule,delayed release(DR/EC) Take 1 capsule by mouth once daily in the evening.   Yes Historical Provider, MD   OneTouch Ultra Test strip TEST 3 TIMES DAILY 11/6/23  Yes Lalitha Ramirez MD   potassium gluconate 595 mg (99 mg) tablet Take 1 tablet by mouth once daily in the evening.   Yes Historical Provider, MD   Coffeen's wort 300 mg capsule Take 300 mg by mouth once daily in the  "evening.   Yes Historical Provider, MD   Victoza 3-Abiel 0.6 mg/0.1 mL (18 mg/3 mL) injection INJECT SUBCUTANEOUSLY 1.8 MG  DAILY 23  Yes Lalitha Ramirez MD     Allergies   Allergen Reactions    Norgestrel-Ethinyl Estradiol Psychosis    Codeine Nausea/vomiting    Atorvastatin Myalgia    Enalapril Confusion    Hydroxyzine Itching and Rash     \"Made condition worse\"    Simvastatin Myalgia    Terazosin Confusion     Social History     Tobacco Use    Smoking status: Former     Types: Cigarettes     Quit date:      Years since quittin.1     Passive exposure: Past    Smokeless tobacco: Never   Substance Use Topics    Alcohol use: Yes     Comment: Occassional         Chemistry    Lab Results   Component Value Date/Time     2024 1232    K 3.9 2024 1232    CL 98 2024 1232    CO2 35 (H) 2024 1232    BUN 18 2024 1232    CREATININE 0.77 2024 1232    Lab Results   Component Value Date/Time    CALCIUM 10.7 (H) 2024 1232    ALKPHOS 65 2024 1100    AST 29 2024 1100    ALT 21 2024 1100    BILITOT 0.7 2024 1100          Lab Results   Component Value Date/Time    WBC 5.7 2024 1100    HGB 13.7 2024 1100    HCT 39.6 2024 1100     2024 1100     Lab Results   Component Value Date/Time    PROTIME 13.4 (H) 2021 0937    INR 1.1 2021 0937     No results found for this or any previous visit (from the past 4464 hour(s)).   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 (CMS/[facility])      Musculoskeletal "   (+) Primary osteoarthritis of right knee      Other   (+) Arthritis of knee, left       Clinical information reviewed:   Tobacco  Allergies  Meds   Med Hx  Surg Hx  OB Status  Fam Hx  Soc   Hx        NPO Detail:  NPO/Void Status  Date of Last Liquid: 02/23/24  Time of Last Liquid: 1100  Date of Last Solid: 02/23/24  Time of Last Solid: 0930 (2 pieces of toast and tea)  Last Intake Type: Clear fluids  Time of Last Void: 1200         Physical Exam    Airway  Mallampati: II  TM distance: >3 FB  Neck ROM: full     Cardiovascular - normal exam  Rhythm: regular  Rate: normal     Dental - normal exam     Pulmonary - normal exam     Abdominal - normal exam  Abdomen: soft             Anesthesia Plan    History of general anesthesia?: yes  History of complications of general anesthesia?: yes    ASA 3     general   (History of memory loss after surgery)  The patient is not a current smoker.  Patient was previously instructed to abstain from smoking on day of procedure.  Patient did not smoke on day of procedure.  Education provided regarding risk of obstructive sleep apnea.  intravenous induction   Postoperative administration of opioids is intended.  Anesthetic plan and risks discussed with patient.  Use of blood products discussed with patient who.

## 2024-02-23 NOTE — ANESTHESIA PROCEDURE NOTES
Airway  Date/Time: 2/23/2024 3:39 PM  Urgency: elective    Airway not difficult    Staffing  Performed: REYNA   Authorized by: Mirta Castañeda MD    Performed by: REYNA Bridges  Patient location during procedure: OR    Indications and Patient Condition  Indications for airway management: anesthesia and airway protection  Spontaneous Ventilation: absent  Sedation level: deep  Preoxygenated: yes  Patient position: sniffing  MILS maintained throughout  Mask difficulty assessment: 0 - not attempted    Final Airway Details  Final airway type: endotracheal airway      Successful airway: ETT  Cuffed: yes   Successful intubation technique: video laryngoscopy  Facilitating devices/methods: intubating stylet  Endotracheal tube insertion site: oral  Blade: Maria Elena  Blade size: #4  ETT size (mm): 7.0  Cormack-Lehane Classification: grade I - full view of glottis  Placement verified by: chest auscultation and capnometry   Inital cuff pressure (cm H2O): 12  Cuff volume (mL): 6  Measured from: lips  ETT to lips (cm): 23  Number of attempts at approach: 1  Number of other approaches attempted: 0    Additional Comments  RSI with cricoid pressure

## 2024-02-26 DIAGNOSIS — N82.4 COLOVAGINAL FISTULA: ICD-10-CM

## 2024-03-04 ENCOUNTER — APPOINTMENT (OUTPATIENT)
Dept: RADIOLOGY | Facility: HOSPITAL | Age: 74
End: 2024-03-04
Payer: MEDICARE

## 2024-03-06 ENCOUNTER — APPOINTMENT (OUTPATIENT)
Dept: SURGERY | Facility: CLINIC | Age: 74
End: 2024-03-06
Payer: MEDICARE

## 2024-03-14 ENCOUNTER — HOSPITAL ENCOUNTER (OUTPATIENT)
Dept: RADIOLOGY | Facility: CLINIC | Age: 74
Discharge: HOME | End: 2024-03-14
Payer: MEDICARE

## 2024-03-14 DIAGNOSIS — N82.4 COLOVAGINAL FISTULA: ICD-10-CM

## 2024-03-14 PROCEDURE — 72197 MRI PELVIS W/O & W/DYE: CPT | Performed by: RADIOLOGY

## 2024-03-14 PROCEDURE — A9575 INJ GADOTERATE MEGLUMI 0.1ML: HCPCS | Performed by: STUDENT IN AN ORGANIZED HEALTH CARE EDUCATION/TRAINING PROGRAM

## 2024-03-14 PROCEDURE — 72197 MRI PELVIS W/O & W/DYE: CPT

## 2024-03-14 PROCEDURE — 2550000001 HC RX 255 CONTRASTS: Performed by: STUDENT IN AN ORGANIZED HEALTH CARE EDUCATION/TRAINING PROGRAM

## 2024-03-14 RX ORDER — GADOTERATE MEGLUMINE 376.9 MG/ML
20 INJECTION INTRAVENOUS
Status: COMPLETED | OUTPATIENT
Start: 2024-03-14 | End: 2024-03-14

## 2024-03-14 RX ADMIN — GADOTERATE MEGLUMINE 20 ML: 376.9 INJECTION INTRAVENOUS at 14:49

## 2024-03-18 NOTE — PROGRESS NOTES
"Subjective   Patient ID: Vanessa Son \"Valerie" is a 74 y.o. female who is s/p EUA flex/sig on 2/23/24 for a colovaginal fistula.    HPI  At the dome of the vaginal canal, a small sinus was identified with clear identification of the feculent debris embedded within the sinus.    MRI pelvis 3/15/24  IMPRESSION:  Previous hysterectomy.      Short tract rectovaginal fistula which extends from the caudal margin  of a thickened segment of the mid sigmoid colon to the vaginal cuff.      Scattered small colonic diverticula of the distal colon with mild  circumferential wall thickening of the sigmoid colon and adjacent  trace fluid signal. Findings could reflect chronic diverticulitis  although component of acute diverticulitis is not excluded.      Circumferential wall thickening of the urinary bladder may be  partially exaggerated by underdistention. Cystitis not excluded.        No issues with urination now.  No c/o any fever/chills.  No c/o any abdominal pain.  She is still having stool and air coming through the vagina.    Abdominal surgeries:  Appendectomy  Cholecystectomy  Hysterectomy-'91  Tubal ligation    Past Medical History:   Diagnosis Date    Adverse effect of anesthesia     Anxiety     Arthritis     Cataract     CKD (chronic kidney disease)     Delayed emergence from general anesthesia     Depression     Diabetes mellitus (CMS/HCC)     Diverticulosis     Fractures     GERD (gastroesophageal reflux disease)     Gout     History of blood transfusion     History of malignant neoplasm     Hx of mammogram 06/2017    cat 2    Hyperlipidemia     Hypertension     Lichen sclerosus     Lumbar disc disease     Mammogram declined     PONV (postoperative nausea and vomiting)     Uterine cancer (CMS/HCC)      Past Surgical History:   Procedure Laterality Date    APPENDECTOMY  1963    CATARACT EXTRACTION  2018    CHOLECYSTECTOMY  1974    COLONOSCOPY  2019    Incomplete so Barium Enema + tics only    HYSTERECTOMY  1991    " uterine CA limited no rad tx nor chemo    JOINT REPLACEMENT      TONSILLECTOMY  1991    TOTAL KNEE ARTHROPLASTY Right 10/2021    TUBAL LIGATION  1977       Review of Systems   Constitutional:  Negative for activity change, appetite change, chills, fatigue, fever and unexpected weight change.   Respiratory:  Negative for cough, choking, chest tightness, shortness of breath and wheezing.    Cardiovascular:  Negative for chest pain, palpitations and leg swelling.   Gastrointestinal:  Negative for abdominal distention, abdominal pain, anal bleeding, blood in stool, constipation, diarrhea, nausea, rectal pain and vomiting.   Genitourinary:  Positive for vaginal discharge. Negative for difficulty urinating, dysuria, frequency and hematuria.        Air and stool per vagina.   Neurological:  Negative for dizziness, weakness and light-headedness.   Psychiatric/Behavioral:  Negative for agitation.    All other systems reviewed and are negative.    Current Outpatient Medications   Medication Sig Dispense Refill    amLODIPine (Norvasc) 5 mg tablet Take 1 tablet (5 mg) by mouth once daily. 90 tablet 3    aspirin 81 mg EC tablet Take 1 tablet (81 mg) by mouth once daily.      cholecalciferol (Vitamin D-3) 5,000 Units tablet Take 1 tablet (5,000 Units) by mouth once daily.      colchicine 0.6 mg tablet TAKE 1 TABLET DAILY AS NEEDED  FOR GOUT 90 tablet 3    hydrocortisone 1 % cream Apply topically 2 times a day. 90 g 3    indapamide (Lozol) 1.25 mg tablet Take 1 tablet (1.25 mg) by mouth once daily. (Patient taking differently: Take 1 tablet (1.25 mg) by mouth once daily in the evening.) 90 tablet 3    insulin lispro (HumaLOG KwikPen Insulin) 100 unit/mL injection Inject 12 Units under the skin 3 times a day with meals. 45 mL 3    KELP ORAL Take 1 capsule by mouth once daily in the evening. (Brown Seaweed Caps)      Lantus Solostar U-100 Insulin 100 unit/mL (3 mL) pen INJECT SUBCUTANEOUSLY 100 UNITS  DAILY (Patient taking  "differently: Inject 80 Units under the skin once daily at bedtime.) 90 mL 3    losartan-hydrochlorothiazide (Hyzaar) 50-12.5 mg tablet Take 0.5 tablets by mouth once daily. (Patient taking differently: Take 0.5 tablets by mouth once daily in the evening.) 45 tablet 0    magnesium 250 mg tablet Take 1 tablet (250 mg) by mouth once daily in the evening.      metoprolol tartrate (Lopressor) 100 mg tablet Take 1 tablet (100 mg) by mouth 2 times a day. 180 tablet 3    omega 3-dha-epa-fish oil 450 mg (128 mg- 322 mg)-650 mg capsule,delayed release(DR/EC) Take 1 capsule by mouth once daily in the evening.      OneTouch Ultra Test strip TEST 3 TIMES DAILY 300 strip 3    potassium gluconate 595 mg (99 mg) tablet Take 1 tablet by mouth once daily in the evening.      Kelsi's wort 300 mg capsule Take 300 mg by mouth once daily in the evening.      Victoza 3-Abiel 0.6 mg/0.1 mL (18 mg/3 mL) injection INJECT SUBCUTANEOUSLY 1.8 MG  DAILY 27 mL 3     No current facility-administered medications for this visit.     Allergies   Allergen Reactions    Norgestrel-Ethinyl Estradiol Psychosis    Codeine Nausea/vomiting    Atorvastatin Myalgia    Enalapril Confusion    Hydroxyzine Itching and Rash     \"Made condition worse\"    Simvastatin Myalgia    Terazosin Confusion         Objective   Physical Exam  Constitutional:       Appearance: Normal appearance.   HENT:      Head: Normocephalic.   Cardiovascular:      Rate and Rhythm: Normal rate and regular rhythm.      Pulses: Normal pulses.      Heart sounds: Normal heart sounds.   Pulmonary:      Effort: Pulmonary effort is normal.      Breath sounds: Normal breath sounds.   Abdominal:      General: Abdomen is flat. Bowel sounds are normal.      Palpations: Abdomen is soft.      Comments: Well-healed right paramedian, right lower quadrant transverse, and vertical midline infra-umbilical incisional scars.   Musculoskeletal:         General: Normal range of motion.      Cervical back: Normal " range of motion and neck supple.   Skin:     General: Skin is warm and dry.   Neurological:      General: No focal deficit present.      Mental Status: She is alert and oriented to person, place, and time.   Psychiatric:         Mood and Affect: Mood normal.         Behavior: Behavior normal.         Assessment/Plan   #Colovaginal fistula  #Obesity, BMI 35.8  -  MRI results reviewed with patient; sigmoid to dome of vaginal cuff fistula  -  Offered open sigmoidectomy, colorectal anastomosis, flexible sigmoidoscopy, possible stoma  -  R/B/A discussed with patient and accompanying  including risks of bleeding, infection, anastomotic complications including leak  -  Plan for 5/13/2024; prep for procedure completed  -  Bowel prep day before surgery  -  PAT requested; CMP ordered for preoperative assessment to check albumin  -  Will need stoma marking preoperatively    Haim Argueta MD   3/19/2024  1:06 PM

## 2024-03-19 ENCOUNTER — OFFICE VISIT (OUTPATIENT)
Dept: SURGERY | Facility: CLINIC | Age: 74
End: 2024-03-19
Payer: MEDICARE

## 2024-03-19 VITALS
DIASTOLIC BLOOD PRESSURE: 80 MMHG | HEART RATE: 75 BPM | BODY MASS INDEX: 35.82 KG/M2 | SYSTOLIC BLOOD PRESSURE: 160 MMHG | WEIGHT: 215 LBS | HEIGHT: 65 IN

## 2024-03-19 DIAGNOSIS — N82.4 COLOVAGINAL FISTULA: Primary | ICD-10-CM

## 2024-03-19 DIAGNOSIS — E66.9 CLASS 2 OBESITY WITH BODY MASS INDEX (BMI) OF 35.0 TO 35.9 IN ADULT, UNSPECIFIED OBESITY TYPE, UNSPECIFIED WHETHER SERIOUS COMORBIDITY PRESENT: ICD-10-CM

## 2024-03-19 PROCEDURE — 1159F MED LIST DOCD IN RCRD: CPT | Performed by: STUDENT IN AN ORGANIZED HEALTH CARE EDUCATION/TRAINING PROGRAM

## 2024-03-19 PROCEDURE — 99215 OFFICE O/P EST HI 40 MIN: CPT | Performed by: STUDENT IN AN ORGANIZED HEALTH CARE EDUCATION/TRAINING PROGRAM

## 2024-03-19 PROCEDURE — 1036F TOBACCO NON-USER: CPT | Performed by: STUDENT IN AN ORGANIZED HEALTH CARE EDUCATION/TRAINING PROGRAM

## 2024-03-19 PROCEDURE — 3077F SYST BP >= 140 MM HG: CPT | Performed by: STUDENT IN AN ORGANIZED HEALTH CARE EDUCATION/TRAINING PROGRAM

## 2024-03-19 PROCEDURE — 3008F BODY MASS INDEX DOCD: CPT | Performed by: STUDENT IN AN ORGANIZED HEALTH CARE EDUCATION/TRAINING PROGRAM

## 2024-03-19 PROCEDURE — 3079F DIAST BP 80-89 MM HG: CPT | Performed by: STUDENT IN AN ORGANIZED HEALTH CARE EDUCATION/TRAINING PROGRAM

## 2024-03-19 RX ORDER — METRONIDAZOLE 500 MG/100ML
500 INJECTION, SOLUTION INTRAVENOUS ONCE
Status: CANCELLED | OUTPATIENT
Start: 2024-03-19

## 2024-03-19 RX ORDER — CEFAZOLIN SODIUM 2 G/100ML
2 INJECTION, SOLUTION INTRAVENOUS ONCE
Status: CANCELLED | OUTPATIENT
Start: 2024-03-19 | End: 2024-03-19

## 2024-03-19 RX ORDER — SODIUM CHLORIDE, SODIUM LACTATE, POTASSIUM CHLORIDE, CALCIUM CHLORIDE 600; 310; 30; 20 MG/100ML; MG/100ML; MG/100ML; MG/100ML
100 INJECTION, SOLUTION INTRAVENOUS CONTINUOUS
Status: CANCELLED | OUTPATIENT
Start: 2024-03-19

## 2024-03-19 ASSESSMENT — ENCOUNTER SYMPTOMS
UNEXPECTED WEIGHT CHANGE: 0
DIFFICULTY URINATING: 0
DIARRHEA: 0
COUGH: 0
FATIGUE: 0
APPETITE CHANGE: 0
ABDOMINAL PAIN: 0
PALPITATIONS: 0
CONSTIPATION: 0
BLOOD IN STOOL: 0
RECTAL PAIN: 0
ANAL BLEEDING: 0
NAUSEA: 0
FEVER: 0
LIGHT-HEADEDNESS: 0
ABDOMINAL DISTENTION: 0
AGITATION: 0
DYSURIA: 0
CHOKING: 0
CHEST TIGHTNESS: 0
WEAKNESS: 0
WHEEZING: 0
DIZZINESS: 0
CHILLS: 0
ACTIVITY CHANGE: 0
HEMATURIA: 0
FREQUENCY: 0
VOMITING: 0
SHORTNESS OF BREATH: 0

## 2024-04-02 ENCOUNTER — TELEPHONE (OUTPATIENT)
Dept: PRIMARY CARE | Facility: CLINIC | Age: 74
End: 2024-04-02
Payer: MEDICARE

## 2024-04-02 DIAGNOSIS — I10 ESSENTIAL HYPERTENSION: ICD-10-CM

## 2024-04-02 RX ORDER — LOSARTAN POTASSIUM AND HYDROCHLOROTHIAZIDE 12.5; 5 MG/1; MG/1
0.5 TABLET ORAL EVERY EVENING
Qty: 45 TABLET | Refills: 3 | Status: SHIPPED | OUTPATIENT
Start: 2024-04-02

## 2024-04-29 ENCOUNTER — LAB (OUTPATIENT)
Dept: LAB | Facility: LAB | Age: 74
End: 2024-04-29
Payer: MEDICARE

## 2024-04-29 ENCOUNTER — PRE-ADMISSION TESTING (OUTPATIENT)
Dept: PREADMISSION TESTING | Facility: HOSPITAL | Age: 74
End: 2024-04-29
Payer: MEDICARE

## 2024-04-29 VITALS
OXYGEN SATURATION: 98 % | WEIGHT: 225.31 LBS | DIASTOLIC BLOOD PRESSURE: 67 MMHG | HEIGHT: 65 IN | SYSTOLIC BLOOD PRESSURE: 164 MMHG | HEART RATE: 73 BPM | TEMPERATURE: 96.8 F | RESPIRATION RATE: 16 BRPM | BODY MASS INDEX: 37.54 KG/M2

## 2024-04-29 DIAGNOSIS — R73.9 TEMPORARY INCREASE IN BLOOD SUGAR FOLLOWING PROCEDURE: ICD-10-CM

## 2024-04-29 DIAGNOSIS — Z01.818 PREOP TESTING: ICD-10-CM

## 2024-04-29 DIAGNOSIS — N82.4 COLOVAGINAL FISTULA: ICD-10-CM

## 2024-04-29 DIAGNOSIS — Z01.818 PREOP TESTING: Primary | ICD-10-CM

## 2024-04-29 LAB
ABO GROUP (TYPE) IN BLOOD: NORMAL
ALBUMIN SERPL BCP-MCNC: 4 G/DL (ref 3.4–5)
ALP SERPL-CCNC: 51 U/L (ref 33–136)
ALT SERPL W P-5'-P-CCNC: 21 U/L (ref 7–45)
ANION GAP SERPL CALC-SCNC: 11 MMOL/L (ref 10–20)
ANTIBODY SCREEN: NORMAL
AST SERPL W P-5'-P-CCNC: 23 U/L (ref 9–39)
BB ANTIBODY IDENTIFICATION: NORMAL
BILIRUB SERPL-MCNC: 0.7 MG/DL (ref 0–1.2)
BUN SERPL-MCNC: 20 MG/DL (ref 6–23)
CALCIUM SERPL-MCNC: 9.8 MG/DL (ref 8.6–10.3)
CASE #: NORMAL
CHLORIDE SERPL-SCNC: 101 MMOL/L (ref 98–107)
CO2 SERPL-SCNC: 32 MMOL/L (ref 21–32)
CREAT SERPL-MCNC: 0.81 MG/DL (ref 0.5–1.05)
EGFRCR SERPLBLD CKD-EPI 2021: 77 ML/MIN/1.73M*2
EST. AVERAGE GLUCOSE BLD GHB EST-MCNC: 148 MG/DL
GLUCOSE SERPL-MCNC: 135 MG/DL (ref 74–99)
HBA1C MFR BLD: 6.8 %
POTASSIUM SERPL-SCNC: 4.1 MMOL/L (ref 3.5–5.3)
PROT SERPL-MCNC: 6.6 G/DL (ref 6.4–8.2)
RH FACTOR (ANTIGEN D): NORMAL
SODIUM SERPL-SCNC: 140 MMOL/L (ref 136–145)

## 2024-04-29 PROCEDURE — 87081 CULTURE SCREEN ONLY: CPT | Mod: STJLAB

## 2024-04-29 PROCEDURE — 86900 BLOOD TYPING SEROLOGIC ABO: CPT

## 2024-04-29 PROCEDURE — 36415 COLL VENOUS BLD VENIPUNCTURE: CPT

## 2024-04-29 PROCEDURE — 80053 COMPREHEN METABOLIC PANEL: CPT

## 2024-04-29 PROCEDURE — 86901 BLOOD TYPING SEROLOGIC RH(D): CPT

## 2024-04-29 PROCEDURE — 86850 RBC ANTIBODY SCREEN: CPT

## 2024-04-29 PROCEDURE — 83036 HEMOGLOBIN GLYCOSYLATED A1C: CPT

## 2024-04-29 PROCEDURE — 99214 OFFICE O/P EST MOD 30 MIN: CPT | Performed by: NURSE PRACTITIONER

## 2024-04-29 RX ORDER — CHLORHEXIDINE GLUCONATE ORAL RINSE 1.2 MG/ML
SOLUTION DENTAL
Qty: 473 ML | Refills: 0 | Status: SHIPPED | OUTPATIENT
Start: 2024-04-29 | End: 2024-05-18 | Stop reason: HOSPADM

## 2024-04-29 ASSESSMENT — DUKE ACTIVITY SCORE INDEX (DASI)
CAN YOU HAVE SEXUAL RELATIONS: NO
CAN YOU RUN A SHORT DISTANCE: NO
TOTAL_SCORE: 18.95
CAN YOU DO YARD WORK LIKE RAKING LEAVES, WEEDING OR PUSHING A MOWER: NO
CAN YOU DO LIGHT WORK AROUND THE HOUSE LIKE DUSTING OR WASHING DISHES: YES
DASI METS SCORE: 5.1
CAN YOU DO MODERATE WORK AROUND THE HOUSE LIKE VACUUMING, SWEEPING FLOORS OR CARRYING GROCERIES: YES
CAN YOU PARTICIPATE IN STRENOUS SPORTS LIKE SWIMMING, SINGLES TENNIS, FOOTBALL, BASKETBALL, OR SKIING: NO
CAN YOU PARTICIPATE IN MODERATE RECREATIONAL ACTIVITIES LIKE GOLF, BOWLING, DANCING, DOUBLES TENNIS OR THROWING A BASEBALL OR FOOTBALL: NO
CAN YOU TAKE CARE OF YOURSELF (EAT, DRESS, BATHE, OR USE TOILET): YES
CAN YOU WALK INDOORS, SUCH AS AROUND YOUR HOUSE: YES
CAN YOU DO HEAVY WORK AROUND THE HOUSE LIKE SCRUBBING FLOORS OR LIFTING AND MOVING HEAVY FURNITURE: NO
CAN YOU WALK A BLOCK OR TWO ON LEVEL GROUND: YES
CAN YOU CLIMB A FLIGHT OF STAIRS OR WALK UP A HILL: YES

## 2024-04-29 ASSESSMENT — ACTIVITIES OF DAILY LIVING (ADL): ADL_SCORE: 0

## 2024-04-29 ASSESSMENT — LIFESTYLE VARIABLES: SMOKING_STATUS: SMOKER

## 2024-04-29 ASSESSMENT — PAIN SCALES - GENERAL: PAINLEVEL_OUTOF10: 0 - NO PAIN

## 2024-04-29 ASSESSMENT — PAIN - FUNCTIONAL ASSESSMENT: PAIN_FUNCTIONAL_ASSESSMENT: 0-10

## 2024-04-29 NOTE — PREPROCEDURE INSTRUCTIONS
Medication List            Accurate as of April 29, 2024  9:05 AM. Always use your most recent med list.                amLODIPine 5 mg tablet  Commonly known as: Norvasc  Take 1 tablet (5 mg) by mouth once daily.  Medication Adjustments for Surgery: Stop 1 day before surgery     aspirin 81 mg EC tablet  Medication Adjustments for Surgery: Stop 7 days before surgery     chlorhexidine 0.12 % solution  Commonly known as: Peridex  15 milliliter(s) orally once a day for 2 doses 15 ml  the night before surgery and 15 ml morning of surgery - swish for 30 seconds -DO NOT SWALLOW, SPIT OUT     cholecalciferol 5,000 Units tablet  Commonly known as: Vitamin D-3  Medication Adjustments for Surgery: Stop 7 days before surgery     colchicine 0.6 mg tablet  TAKE 1 TABLET DAILY AS NEEDED  FOR GOUT  Medication Adjustments for Surgery: Other (Comment)  Notes to patient: Collaborate with physician     hydrocortisone 1 % cream  Apply topically 2 times a day.  Medication Adjustments for Surgery: Continue until night before surgery     indapamide 1.25 mg tablet  Commonly known as: Lozol  Take 1 tablet (1.25 mg) by mouth once daily.  Medication Adjustments for Surgery: Take morning of surgery with sip of water, no other fluids     insulin lispro 100 unit/mL injection  Commonly known as: HumaLOG KwikPen Insulin  Inject 12 Units under the skin 3 times a day with meals.  Medication Adjustments for Surgery: Other (Comment)  Notes to patient: Take full dose on evening before OR  Hold day of OR     KELP ORAL  Medication Adjustments for Surgery: Stop 7 days before surgery     Lantus Solostar U-100 Insulin 100 unit/mL (3 mL) pen  Generic drug: insulin glargine  INJECT SUBCUTANEOUSLY 100 UNITS  DAILY  Medication Adjustments for Surgery: Other (Comment)  Notes to patient: Collaborate with physician     losartan-hydrochlorothiazide 50-12.5 mg tablet  Commonly known as: Hyzaar  Take 0.5 tablets by mouth once daily in the evening.  Medication  Adjustments for Surgery: Stop 1 day before surgery     magnesium 250 mg tablet  Medication Adjustments for Surgery: Take morning of surgery with sip of water, no other fluids     metoprolol tartrate 100 mg tablet  Commonly known as: Lopressor  Take 1 tablet (100 mg) by mouth 2 times a day.  Medication Adjustments for Surgery: Take morning of surgery with sip of water, no other fluids     omega 3-dha-epa-fish oil 450 mg (128 mg- 322 mg)-650 mg capsule,delayed release(DR/EC)  Medication Adjustments for Surgery: Stop 7 days before surgery     OneTouch Ultra Test strip  Generic drug: blood sugar diagnostic  TEST 3 TIMES DAILY     potassium gluconate 595 mg (99 mg) tablet  Medication Adjustments for Surgery: Stop 7 days before surgery     Kelsi's wort 300 mg capsule  Medication Adjustments for Surgery: Stop 7 days before surgery     Victoza 3-Abiel 0.6 mg/0.1 mL (18 mg/3 mL) injection  Generic drug: liraglutide  INJECT SUBCUTANEOUSLY 1.8 MG  DAILY  Medication Adjustments for Surgery: Continue until night before surgery                                  PRE-OPERATIVE INSTRUCTIONS    You will receive notification one business day prior to your procedure to confirm your arrival time. It is important that you answer your phone and/or check your messages during this time. If you do not hear from the surgery center by 5 pm. the day before your procedure, please call [phone].     Please enter the building through the Outpatient entrance and take the elevator off the lobby to the 2nd floor then check in at the Outpatient Surgery desk on the 2nd floor.    INSTRUCTIONS:  Talk to your surgeon for instructions if you should stop your aspirin, blood thinner, or diabetes medicines.  DO NOT take any multivitamins or over the counter supplements for 7-10 days before surgery.  If not being admitted, you must have an adult immediately available to drive you home after surgery. We also highly recommend you have someone stay with you  for the entire day and night of your surgery.  For children having surgery, a parent or legal guardian must accompany them to the surgery center. If this is not possible, please call [phone] to make additional arrangements.  For adults who are unable to consent or make medical decisions for themselves, a legal guardian or Power of  must accompany them to the surgery center. If this is not possible, please call [phone] to make additional arrangements.  Wear comfortable, loose fitting clothing.  All jewelry and piercings must be removed. If you are unable to remove an item or have a dermal piercing, please be sure to tell the nurse when you arrive for surgery.  Nail polish and make-up must be removed.  Avoid smoking or consuming alcohol for 24 hours before surgery.  To help prevent infection, please take a shower/bath and wash your hair the night before and/or morning of surgery (or follow other specific bathing instructions provided).    Preoperative Fasting Guidelines    Why must I stop eating and drinking near surgery time?  With sedation, food or liquid in your stomach can enter your lungs causing serious complications  Increases nausea and vomiting    When do I need to stop eating and drinking before my surgery?  Do not eat any solid food after midnight the night before your surgery/procedure unless otherwise instructed by your surgeon.   You may have up to 13.5 ounces of clear liquid until TWO hours before your instructed arrival time to the hospital.  This includes water, black tea/coffee, (no milk or cream) apple juice, and electrolyte drinks (Gatorade).   You may chew gum until TWO hours before your surgery/procedure      If applicable, notify your surgeons office immediately of any new skin changes that occur to the surgical limb.      If you have any questions or concerns, please call Pre-Admission Testing at [phone].
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
## 2024-05-01 LAB — STAPHYLOCOCCUS SPEC CULT: NORMAL

## 2024-05-02 ENCOUNTER — PREP FOR PROCEDURE (OUTPATIENT)
Dept: SURGERY | Facility: HOSPITAL | Age: 74
End: 2024-05-02
Payer: MEDICARE

## 2024-05-10 ENCOUNTER — LAB (OUTPATIENT)
Dept: LAB | Facility: LAB | Age: 74
End: 2024-05-10
Payer: MEDICARE

## 2024-05-10 DIAGNOSIS — N82.4 COLOVAGINAL FISTULA: ICD-10-CM

## 2024-05-10 DIAGNOSIS — N82.4 COLOVAGINAL FISTULA: Primary | ICD-10-CM

## 2024-05-10 LAB
ABO GROUP (TYPE) IN BLOOD: NORMAL
ANTIBODY SCREEN: NORMAL
BB ANTIBODY IDENTIFICATION: NORMAL
CASE #: NORMAL
RH FACTOR (ANTIGEN D): NORMAL

## 2024-05-10 PROCEDURE — 36415 COLL VENOUS BLD VENIPUNCTURE: CPT

## 2024-05-10 PROCEDURE — 86901 BLOOD TYPING SEROLOGIC RH(D): CPT

## 2024-05-10 PROCEDURE — 86922 COMPATIBILITY TEST ANTIGLOB: CPT

## 2024-05-10 PROCEDURE — 86870 RBC ANTIBODY IDENTIFICATION: CPT

## 2024-05-10 PROCEDURE — 86900 BLOOD TYPING SEROLOGIC ABO: CPT

## 2024-05-10 PROCEDURE — 86850 RBC ANTIBODY SCREEN: CPT

## 2024-05-13 ENCOUNTER — ANESTHESIA (OUTPATIENT)
Dept: OPERATING ROOM | Facility: HOSPITAL | Age: 74
DRG: 331 | End: 2024-05-13
Payer: MEDICARE

## 2024-05-13 ENCOUNTER — ANESTHESIA EVENT (OUTPATIENT)
Dept: OPERATING ROOM | Facility: HOSPITAL | Age: 74
DRG: 331 | End: 2024-05-13
Payer: MEDICARE

## 2024-05-13 ENCOUNTER — HOSPITAL ENCOUNTER (INPATIENT)
Facility: HOSPITAL | Age: 74
LOS: 5 days | Discharge: HOME | DRG: 331 | End: 2024-05-18
Attending: STUDENT IN AN ORGANIZED HEALTH CARE EDUCATION/TRAINING PROGRAM | Admitting: STUDENT IN AN ORGANIZED HEALTH CARE EDUCATION/TRAINING PROGRAM
Payer: MEDICARE

## 2024-05-13 ENCOUNTER — APPOINTMENT (OUTPATIENT)
Dept: RADIOLOGY | Facility: HOSPITAL | Age: 74
DRG: 331 | End: 2024-05-13
Payer: MEDICARE

## 2024-05-13 DIAGNOSIS — N82.4 COLOVAGINAL FISTULA: Primary | ICD-10-CM

## 2024-05-13 LAB
GLUCOSE BLD MANUAL STRIP-MCNC: 131 MG/DL (ref 74–99)
GLUCOSE BLD MANUAL STRIP-MCNC: 226 MG/DL (ref 74–99)
GLUCOSE BLD MANUAL STRIP-MCNC: 246 MG/DL (ref 74–99)
GLUCOSE BLD MANUAL STRIP-MCNC: 286 MG/DL (ref 74–99)
GLUCOSE BLD MANUAL STRIP-MCNC: 291 MG/DL (ref 74–99)

## 2024-05-13 PROCEDURE — 2500000001 HC RX 250 WO HCPCS SELF ADMINISTERED DRUGS (ALT 637 FOR MEDICARE OP): Performed by: STUDENT IN AN ORGANIZED HEALTH CARE EDUCATION/TRAINING PROGRAM

## 2024-05-13 PROCEDURE — 2500000004 HC RX 250 GENERAL PHARMACY W/ HCPCS (ALT 636 FOR OP/ED): Mod: JZ | Performed by: STUDENT IN AN ORGANIZED HEALTH CARE EDUCATION/TRAINING PROGRAM

## 2024-05-13 PROCEDURE — 0TJB8ZZ INSPECTION OF BLADDER, VIA NATURAL OR ARTIFICIAL OPENING ENDOSCOPIC: ICD-10-PCS | Performed by: UROLOGY

## 2024-05-13 PROCEDURE — 52005 CYSTO W/URTRL CATHJ: CPT | Performed by: UROLOGY

## 2024-05-13 PROCEDURE — 99100 ANES PT EXTEME AGE<1 YR&>70: CPT | Performed by: ANESTHESIOLOGY

## 2024-05-13 PROCEDURE — 2500000004 HC RX 250 GENERAL PHARMACY W/ HCPCS (ALT 636 FOR OP/ED): Mod: JZ | Performed by: NURSE ANESTHETIST, CERTIFIED REGISTERED

## 2024-05-13 PROCEDURE — 2720000007 HC OR 272 NO HCPCS: Performed by: STUDENT IN AN ORGANIZED HEALTH CARE EDUCATION/TRAINING PROGRAM

## 2024-05-13 PROCEDURE — 0TNB0ZZ RELEASE BLADDER, OPEN APPROACH: ICD-10-PCS | Performed by: STUDENT IN AN ORGANIZED HEALTH CARE EDUCATION/TRAINING PROGRAM

## 2024-05-13 PROCEDURE — 3600000008 HC OR TIME - EACH INCREMENTAL 1 MINUTE - PROCEDURE LEVEL THREE: Performed by: STUDENT IN AN ORGANIZED HEALTH CARE EDUCATION/TRAINING PROGRAM

## 2024-05-13 PROCEDURE — 3600000003 HC OR TIME - INITIAL BASE CHARGE - PROCEDURE LEVEL THREE: Performed by: STUDENT IN AN ORGANIZED HEALTH CARE EDUCATION/TRAINING PROGRAM

## 2024-05-13 PROCEDURE — 7100000002 HC RECOVERY ROOM TIME - EACH INCREMENTAL 1 MINUTE: Performed by: STUDENT IN AN ORGANIZED HEALTH CARE EDUCATION/TRAINING PROGRAM

## 2024-05-13 PROCEDURE — 0UBG4ZZ EXCISION OF VAGINA, PERCUTANEOUS ENDOSCOPIC APPROACH: ICD-10-PCS | Performed by: STUDENT IN AN ORGANIZED HEALTH CARE EDUCATION/TRAINING PROGRAM

## 2024-05-13 PROCEDURE — 7100000001 HC RECOVERY ROOM TIME - INITIAL BASE CHARGE: Performed by: STUDENT IN AN ORGANIZED HEALTH CARE EDUCATION/TRAINING PROGRAM

## 2024-05-13 PROCEDURE — 44146 PARTIAL REMOVAL OF COLON: CPT | Performed by: STUDENT IN AN ORGANIZED HEALTH CARE EDUCATION/TRAINING PROGRAM

## 2024-05-13 PROCEDURE — 88307 TISSUE EXAM BY PATHOLOGIST: CPT | Performed by: PATHOLOGY

## 2024-05-13 PROCEDURE — 0DTN0ZZ RESECTION OF SIGMOID COLON, OPEN APPROACH: ICD-10-PCS | Performed by: STUDENT IN AN ORGANIZED HEALTH CARE EDUCATION/TRAINING PROGRAM

## 2024-05-13 PROCEDURE — 3700000002 HC GENERAL ANESTHESIA TIME - EACH INCREMENTAL 1 MINUTE: Performed by: STUDENT IN AN ORGANIZED HEALTH CARE EDUCATION/TRAINING PROGRAM

## 2024-05-13 PROCEDURE — A44146 PR PART REMOVAL COLON W COLOPROC,COLOST: Performed by: ANESTHESIOLOGY

## 2024-05-13 PROCEDURE — 2500000005 HC RX 250 GENERAL PHARMACY W/O HCPCS: Performed by: NURSE ANESTHETIST, CERTIFIED REGISTERED

## 2024-05-13 PROCEDURE — 0DJD8ZZ INSPECTION OF LOWER INTESTINAL TRACT, VIA NATURAL OR ARTIFICIAL OPENING ENDOSCOPIC: ICD-10-PCS | Performed by: STUDENT IN AN ORGANIZED HEALTH CARE EDUCATION/TRAINING PROGRAM

## 2024-05-13 PROCEDURE — 88307 TISSUE EXAM BY PATHOLOGIST: CPT | Mod: TC,PARLAB,STJLAB | Performed by: STUDENT IN AN ORGANIZED HEALTH CARE EDUCATION/TRAINING PROGRAM

## 2024-05-13 PROCEDURE — 36415 COLL VENOUS BLD VENIPUNCTURE: CPT | Performed by: STUDENT IN AN ORGANIZED HEALTH CARE EDUCATION/TRAINING PROGRAM

## 2024-05-13 PROCEDURE — 2500000004 HC RX 250 GENERAL PHARMACY W/ HCPCS (ALT 636 FOR OP/ED): Performed by: STUDENT IN AN ORGANIZED HEALTH CARE EDUCATION/TRAINING PROGRAM

## 2024-05-13 PROCEDURE — C1769 GUIDE WIRE: HCPCS | Performed by: STUDENT IN AN ORGANIZED HEALTH CARE EDUCATION/TRAINING PROGRAM

## 2024-05-13 PROCEDURE — 74420 UROGRAPHY RTRGR +-KUB: CPT | Performed by: UROLOGY

## 2024-05-13 PROCEDURE — 45341 SIGMOIDOSCOPY W/ULTRASOUND: CPT | Performed by: STUDENT IN AN ORGANIZED HEALTH CARE EDUCATION/TRAINING PROGRAM

## 2024-05-13 PROCEDURE — 44139 MOBILIZATION OF COLON: CPT | Performed by: STUDENT IN AN ORGANIZED HEALTH CARE EDUCATION/TRAINING PROGRAM

## 2024-05-13 PROCEDURE — A44146 PR PART REMOVAL COLON W COLOPROC,COLOST: Performed by: NURSE ANESTHETIST, CERTIFIED REGISTERED

## 2024-05-13 PROCEDURE — 2500000002 HC RX 250 W HCPCS SELF ADMINISTERED DRUGS (ALT 637 FOR MEDICARE OP, ALT 636 FOR OP/ED): Performed by: STUDENT IN AN ORGANIZED HEALTH CARE EDUCATION/TRAINING PROGRAM

## 2024-05-13 PROCEDURE — BT141ZZ FLUOROSCOPY OF KIDNEYS, URETERS AND BLADDER USING LOW OSMOLAR CONTRAST: ICD-10-PCS | Performed by: UROLOGY

## 2024-05-13 PROCEDURE — 3700000001 HC GENERAL ANESTHESIA TIME - INITIAL BASE CHARGE: Performed by: STUDENT IN AN ORGANIZED HEALTH CARE EDUCATION/TRAINING PROGRAM

## 2024-05-13 PROCEDURE — 1100000001 HC PRIVATE ROOM DAILY

## 2024-05-13 PROCEDURE — 2500000005 HC RX 250 GENERAL PHARMACY W/O HCPCS: Performed by: STUDENT IN AN ORGANIZED HEALTH CARE EDUCATION/TRAINING PROGRAM

## 2024-05-13 PROCEDURE — P9045 ALBUMIN (HUMAN), 5%, 250 ML: HCPCS | Mod: JZ | Performed by: NURSE ANESTHETIST, CERTIFIED REGISTERED

## 2024-05-13 PROCEDURE — 0UNG0ZZ RELEASE VAGINA, OPEN APPROACH: ICD-10-PCS | Performed by: STUDENT IN AN ORGANIZED HEALTH CARE EDUCATION/TRAINING PROGRAM

## 2024-05-13 PROCEDURE — 82947 ASSAY GLUCOSE BLOOD QUANT: CPT | Mod: 91

## 2024-05-13 PROCEDURE — 2500000004 HC RX 250 GENERAL PHARMACY W/ HCPCS (ALT 636 FOR OP/ED): Performed by: ANESTHESIOLOGY

## 2024-05-13 PROCEDURE — 0DTP0ZZ RESECTION OF RECTUM, OPEN APPROACH: ICD-10-PCS | Performed by: STUDENT IN AN ORGANIZED HEALTH CARE EDUCATION/TRAINING PROGRAM

## 2024-05-13 RX ORDER — SODIUM CHLORIDE, SODIUM LACTATE, POTASSIUM CHLORIDE, CALCIUM CHLORIDE 600; 310; 30; 20 MG/100ML; MG/100ML; MG/100ML; MG/100ML
50 INJECTION, SOLUTION INTRAVENOUS CONTINUOUS
Status: DISCONTINUED | OUTPATIENT
Start: 2024-05-13 | End: 2024-05-14

## 2024-05-13 RX ORDER — METHYLENE BLUE 5 MG/ML
INJECTION INTRAVENOUS AS NEEDED
Status: DISCONTINUED | OUTPATIENT
Start: 2024-05-13 | End: 2024-05-13

## 2024-05-13 RX ORDER — LIDOCAINE HYDROCHLORIDE 20 MG/ML
INJECTION, SOLUTION EPIDURAL; INFILTRATION; INTRACAUDAL; PERINEURAL AS NEEDED
Status: DISCONTINUED | OUTPATIENT
Start: 2024-05-13 | End: 2024-05-13

## 2024-05-13 RX ORDER — LIDOCAINE HYDROCHLORIDE 10 MG/ML
0.1 INJECTION INFILTRATION; PERINEURAL ONCE
Status: CANCELLED | OUTPATIENT
Start: 2024-05-13 | End: 2024-05-13

## 2024-05-13 RX ORDER — HYDROMORPHONE HYDROCHLORIDE 1 MG/ML
INJECTION, SOLUTION INTRAMUSCULAR; INTRAVENOUS; SUBCUTANEOUS AS NEEDED
Status: DISCONTINUED | OUTPATIENT
Start: 2024-05-13 | End: 2024-05-13

## 2024-05-13 RX ORDER — COLCHICINE 0.6 MG/1
0.6 TABLET ORAL DAILY PRN
Status: DISCONTINUED | OUTPATIENT
Start: 2024-05-13 | End: 2024-05-18 | Stop reason: HOSPADM

## 2024-05-13 RX ORDER — ENOXAPARIN SODIUM 100 MG/ML
40 INJECTION SUBCUTANEOUS EVERY 24 HOURS
Status: DISCONTINUED | OUTPATIENT
Start: 2024-05-14 | End: 2024-05-18 | Stop reason: HOSPADM

## 2024-05-13 RX ORDER — PROPOFOL 10 MG/ML
INJECTION, EMULSION INTRAVENOUS AS NEEDED
Status: DISCONTINUED | OUTPATIENT
Start: 2024-05-13 | End: 2024-05-13

## 2024-05-13 RX ORDER — NALOXONE HYDROCHLORIDE 0.4 MG/ML
0.2 INJECTION, SOLUTION INTRAMUSCULAR; INTRAVENOUS; SUBCUTANEOUS EVERY 5 MIN PRN
Status: DISCONTINUED | OUTPATIENT
Start: 2024-05-13 | End: 2024-05-18 | Stop reason: HOSPADM

## 2024-05-13 RX ORDER — SODIUM CHLORIDE, SODIUM LACTATE, POTASSIUM CHLORIDE, CALCIUM CHLORIDE 600; 310; 30; 20 MG/100ML; MG/100ML; MG/100ML; MG/100ML
100 INJECTION, SOLUTION INTRAVENOUS CONTINUOUS
Status: DISCONTINUED | OUTPATIENT
Start: 2024-05-13 | End: 2024-05-13

## 2024-05-13 RX ORDER — OXYCODONE AND ACETAMINOPHEN 5; 325 MG/1; MG/1
1 TABLET ORAL EVERY 4 HOURS PRN
Status: DISCONTINUED | OUTPATIENT
Start: 2024-05-13 | End: 2024-05-13 | Stop reason: HOSPADM

## 2024-05-13 RX ORDER — ACETAMINOPHEN 325 MG/1
975 TABLET ORAL ONCE
Status: CANCELLED | OUTPATIENT
Start: 2024-05-13 | End: 2024-05-13

## 2024-05-13 RX ORDER — METRONIDAZOLE 500 MG/100ML
INJECTION, SOLUTION INTRAVENOUS AS NEEDED
Status: DISCONTINUED | OUTPATIENT
Start: 2024-05-13 | End: 2024-05-13

## 2024-05-13 RX ORDER — AMLODIPINE BESYLATE 5 MG/1
5 TABLET ORAL DAILY
Status: DISCONTINUED | OUTPATIENT
Start: 2024-05-13 | End: 2024-05-18 | Stop reason: HOSPADM

## 2024-05-13 RX ORDER — ALBUTEROL SULFATE 0.83 MG/ML
2.5 SOLUTION RESPIRATORY (INHALATION) ONCE AS NEEDED
Status: DISCONTINUED | OUTPATIENT
Start: 2024-05-13 | End: 2024-05-13 | Stop reason: HOSPADM

## 2024-05-13 RX ORDER — PROPOFOL 10 MG/ML
INJECTION, EMULSION INTRAVENOUS CONTINUOUS PRN
Status: DISCONTINUED | OUTPATIENT
Start: 2024-05-13 | End: 2024-05-13

## 2024-05-13 RX ORDER — METOPROLOL TARTRATE 50 MG/1
50 TABLET ORAL 2 TIMES DAILY
Status: DISCONTINUED | OUTPATIENT
Start: 2024-05-13 | End: 2024-05-18 | Stop reason: HOSPADM

## 2024-05-13 RX ORDER — ASPIRIN 81 MG/1
81 TABLET ORAL DAILY
Status: DISCONTINUED | OUTPATIENT
Start: 2024-05-13 | End: 2024-05-13

## 2024-05-13 RX ORDER — LABETALOL HYDROCHLORIDE 5 MG/ML
5 INJECTION, SOLUTION INTRAVENOUS ONCE AS NEEDED
Status: DISCONTINUED | OUTPATIENT
Start: 2024-05-13 | End: 2024-05-13 | Stop reason: HOSPADM

## 2024-05-13 RX ORDER — OXYCODONE AND ACETAMINOPHEN 5; 325 MG/1; MG/1
1 TABLET ORAL EVERY 4 HOURS PRN
Status: DISCONTINUED | OUTPATIENT
Start: 2024-05-13 | End: 2024-05-13

## 2024-05-13 RX ORDER — MIDAZOLAM HYDROCHLORIDE 1 MG/ML
INJECTION, SOLUTION INTRAMUSCULAR; INTRAVENOUS AS NEEDED
Status: DISCONTINUED | OUTPATIENT
Start: 2024-05-13 | End: 2024-05-13

## 2024-05-13 RX ORDER — PHENYLEPHRINE HCL IN 0.9% NACL 1 MG/10 ML
SYRINGE (ML) INTRAVENOUS AS NEEDED
Status: DISCONTINUED | OUTPATIENT
Start: 2024-05-13 | End: 2024-05-13

## 2024-05-13 RX ORDER — DEXTROSE 50 % IN WATER (D50W) INTRAVENOUS SYRINGE
12.5
Status: DISCONTINUED | OUTPATIENT
Start: 2024-05-13 | End: 2024-05-18 | Stop reason: HOSPADM

## 2024-05-13 RX ORDER — HYDRALAZINE HYDROCHLORIDE 20 MG/ML
5 INJECTION INTRAMUSCULAR; INTRAVENOUS EVERY 30 MIN PRN
Status: DISCONTINUED | OUTPATIENT
Start: 2024-05-13 | End: 2024-05-13 | Stop reason: HOSPADM

## 2024-05-13 RX ORDER — HYDROMORPHONE HCL/0.9% NACL/PF 15 MG/30ML
PATIENT CONTROLLED ANALGESIA SYRINGE INTRAVENOUS CONTINUOUS
Status: DISCONTINUED | OUTPATIENT
Start: 2024-05-13 | End: 2024-05-14

## 2024-05-13 RX ORDER — HYDRALAZINE HYDROCHLORIDE 20 MG/ML
5 INJECTION INTRAMUSCULAR; INTRAVENOUS EVERY 30 MIN PRN
Status: DISCONTINUED | OUTPATIENT
Start: 2024-05-13 | End: 2024-05-13

## 2024-05-13 RX ORDER — LIDOCAINE HYDROCHLORIDE 10 MG/ML
0.1 INJECTION INFILTRATION; PERINEURAL ONCE
Status: DISCONTINUED | OUTPATIENT
Start: 2024-05-13 | End: 2024-05-13 | Stop reason: HOSPADM

## 2024-05-13 RX ORDER — ONDANSETRON HYDROCHLORIDE 2 MG/ML
4 INJECTION, SOLUTION INTRAVENOUS ONCE AS NEEDED
Status: DISCONTINUED | OUTPATIENT
Start: 2024-05-13 | End: 2024-05-13

## 2024-05-13 RX ORDER — DIPHENHYDRAMINE HYDROCHLORIDE 50 MG/ML
12.5 INJECTION INTRAMUSCULAR; INTRAVENOUS ONCE AS NEEDED
Status: DISCONTINUED | OUTPATIENT
Start: 2024-05-13 | End: 2024-05-13

## 2024-05-13 RX ORDER — ALBUTEROL SULFATE 0.83 MG/ML
2.5 SOLUTION RESPIRATORY (INHALATION) ONCE AS NEEDED
Status: DISCONTINUED | OUTPATIENT
Start: 2024-05-13 | End: 2024-05-13

## 2024-05-13 RX ORDER — ROCURONIUM BROMIDE 50 MG/5 ML
SYRINGE (ML) INTRAVENOUS AS NEEDED
Status: DISCONTINUED | OUTPATIENT
Start: 2024-05-13 | End: 2024-05-13

## 2024-05-13 RX ORDER — SODIUM CHLORIDE, SODIUM LACTATE, POTASSIUM CHLORIDE, CALCIUM CHLORIDE 600; 310; 30; 20 MG/100ML; MG/100ML; MG/100ML; MG/100ML
INJECTION, SOLUTION INTRAVENOUS CONTINUOUS PRN
Status: DISCONTINUED | OUTPATIENT
Start: 2024-05-13 | End: 2024-05-13

## 2024-05-13 RX ORDER — NALOXONE HYDROCHLORIDE 0.4 MG/ML
0.2 INJECTION, SOLUTION INTRAMUSCULAR; INTRAVENOUS; SUBCUTANEOUS AS NEEDED
Status: DISCONTINUED | OUTPATIENT
Start: 2024-05-13 | End: 2024-05-15

## 2024-05-13 RX ORDER — CEFAZOLIN SODIUM 2 G/100ML
2 INJECTION, SOLUTION INTRAVENOUS ONCE
Status: COMPLETED | OUTPATIENT
Start: 2024-05-13 | End: 2024-05-13

## 2024-05-13 RX ORDER — ONDANSETRON HYDROCHLORIDE 2 MG/ML
INJECTION, SOLUTION INTRAVENOUS AS NEEDED
Status: DISCONTINUED | OUTPATIENT
Start: 2024-05-13 | End: 2024-05-13

## 2024-05-13 RX ORDER — DEXTROSE 50 % IN WATER (D50W) INTRAVENOUS SYRINGE
25
Status: DISCONTINUED | OUTPATIENT
Start: 2024-05-13 | End: 2024-05-18 | Stop reason: HOSPADM

## 2024-05-13 RX ORDER — DIPHENHYDRAMINE HYDROCHLORIDE 50 MG/ML
12.5 INJECTION INTRAMUSCULAR; INTRAVENOUS ONCE AS NEEDED
Status: DISCONTINUED | OUTPATIENT
Start: 2024-05-13 | End: 2024-05-13 | Stop reason: HOSPADM

## 2024-05-13 RX ORDER — ONDANSETRON HYDROCHLORIDE 2 MG/ML
4 INJECTION, SOLUTION INTRAVENOUS ONCE AS NEEDED
Status: DISCONTINUED | OUTPATIENT
Start: 2024-05-13 | End: 2024-05-13 | Stop reason: HOSPADM

## 2024-05-13 RX ORDER — OXYCODONE HYDROCHLORIDE 5 MG/1
5 TABLET ORAL EVERY 4 HOURS PRN
Status: DISCONTINUED | OUTPATIENT
Start: 2024-05-13 | End: 2024-05-13 | Stop reason: HOSPADM

## 2024-05-13 RX ORDER — OXYCODONE HYDROCHLORIDE 5 MG/1
5 TABLET ORAL EVERY 4 HOURS PRN
Status: DISCONTINUED | OUTPATIENT
Start: 2024-05-13 | End: 2024-05-13

## 2024-05-13 RX ORDER — FAMOTIDINE 10 MG/ML
INJECTION INTRAVENOUS AS NEEDED
Status: DISCONTINUED | OUTPATIENT
Start: 2024-05-13 | End: 2024-05-13

## 2024-05-13 RX ORDER — SODIUM CHLORIDE, SODIUM LACTATE, POTASSIUM CHLORIDE, CALCIUM CHLORIDE 600; 310; 30; 20 MG/100ML; MG/100ML; MG/100ML; MG/100ML
100 INJECTION, SOLUTION INTRAVENOUS CONTINUOUS
Status: DISCONTINUED | OUTPATIENT
Start: 2024-05-13 | End: 2024-05-14

## 2024-05-13 RX ORDER — LABETALOL HYDROCHLORIDE 5 MG/ML
5 INJECTION, SOLUTION INTRAVENOUS ONCE AS NEEDED
Status: DISCONTINUED | OUTPATIENT
Start: 2024-05-13 | End: 2024-05-13

## 2024-05-13 RX ORDER — ALBUMIN HUMAN 50 G/1000ML
SOLUTION INTRAVENOUS AS NEEDED
Status: DISCONTINUED | OUTPATIENT
Start: 2024-05-13 | End: 2024-05-13

## 2024-05-13 RX ORDER — ACETAMINOPHEN 325 MG/1
650 TABLET ORAL EVERY 6 HOURS
Status: DISCONTINUED | OUTPATIENT
Start: 2024-05-13 | End: 2024-05-18 | Stop reason: HOSPADM

## 2024-05-13 RX ORDER — FENTANYL CITRATE 50 UG/ML
INJECTION, SOLUTION INTRAMUSCULAR; INTRAVENOUS AS NEEDED
Status: DISCONTINUED | OUTPATIENT
Start: 2024-05-13 | End: 2024-05-13

## 2024-05-13 RX ORDER — METRONIDAZOLE 500 MG/100ML
500 INJECTION, SOLUTION INTRAVENOUS ONCE
Status: COMPLETED | OUTPATIENT
Start: 2024-05-13 | End: 2024-05-13

## 2024-05-13 RX ORDER — FUROSEMIDE 10 MG/ML
INJECTION INTRAMUSCULAR; INTRAVENOUS AS NEEDED
Status: DISCONTINUED | OUTPATIENT
Start: 2024-05-13 | End: 2024-05-13

## 2024-05-13 RX ADMIN — Medication 100 MCG: at 11:19

## 2024-05-13 RX ADMIN — ACETAMINOPHEN 650 MG: 325 TABLET ORAL at 22:00

## 2024-05-13 RX ADMIN — Medication 10 MG: at 13:14

## 2024-05-13 RX ADMIN — PROPOFOL 50 MG: 10 INJECTION, EMULSION INTRAVENOUS at 08:03

## 2024-05-13 RX ADMIN — SODIUM CHLORIDE, POTASSIUM CHLORIDE, SODIUM LACTATE AND CALCIUM CHLORIDE: 600; 310; 30; 20 INJECTION, SOLUTION INTRAVENOUS at 13:19

## 2024-05-13 RX ADMIN — Medication 2 L/MIN: at 18:00

## 2024-05-13 RX ADMIN — HYDROMORPHONE HYDROCHLORIDE 0.5 MG: 1 INJECTION, SOLUTION INTRAMUSCULAR; INTRAVENOUS; SUBCUTANEOUS at 11:12

## 2024-05-13 RX ADMIN — Medication 150 MCG: at 08:23

## 2024-05-13 RX ADMIN — Medication 10 MG: at 09:17

## 2024-05-13 RX ADMIN — Medication 10 MG: at 11:16

## 2024-05-13 RX ADMIN — SODIUM CHLORIDE, POTASSIUM CHLORIDE, SODIUM LACTATE AND CALCIUM CHLORIDE: 600; 310; 30; 20 INJECTION, SOLUTION INTRAVENOUS at 08:05

## 2024-05-13 RX ADMIN — Medication 10 MG: at 10:24

## 2024-05-13 RX ADMIN — Medication 100 MCG: at 12:57

## 2024-05-13 RX ADMIN — PROPOFOL 50 MCG/KG/MIN: 10 INJECTION, EMULSION INTRAVENOUS at 07:58

## 2024-05-13 RX ADMIN — CEFAZOLIN SODIUM 2 G: 2 INJECTION, SOLUTION INTRAVENOUS at 12:00

## 2024-05-13 RX ADMIN — HYDROMORPHONE HYDROCHLORIDE 0.5 MG: 1 INJECTION, SOLUTION INTRAMUSCULAR; INTRAVENOUS; SUBCUTANEOUS at 13:33

## 2024-05-13 RX ADMIN — SODIUM CHLORIDE, POTASSIUM CHLORIDE, SODIUM LACTATE AND CALCIUM CHLORIDE 50 ML/HR: 600; 310; 30; 20 INJECTION, SOLUTION INTRAVENOUS at 15:51

## 2024-05-13 RX ADMIN — FAMOTIDINE 20 MG: 10 INJECTION, SOLUTION INTRAVENOUS at 07:46

## 2024-05-13 RX ADMIN — MIDAZOLAM 1 MG: 1 INJECTION INTRAMUSCULAR; INTRAVENOUS at 07:38

## 2024-05-13 RX ADMIN — Medication 20 MG: at 08:28

## 2024-05-13 RX ADMIN — Medication 10 MG: at 10:59

## 2024-05-13 RX ADMIN — Medication 2 PERCENT: at 20:00

## 2024-05-13 RX ADMIN — SODIUM CHLORIDE, POTASSIUM CHLORIDE, SODIUM LACTATE AND CALCIUM CHLORIDE: 600; 310; 30; 20 INJECTION, SOLUTION INTRAVENOUS at 07:38

## 2024-05-13 RX ADMIN — Medication 100 MCG: at 11:34

## 2024-05-13 RX ADMIN — Medication 10 MG: at 11:57

## 2024-05-13 RX ADMIN — SODIUM CHLORIDE, POTASSIUM CHLORIDE, SODIUM LACTATE AND CALCIUM CHLORIDE: 600; 310; 30; 20 INJECTION, SOLUTION INTRAVENOUS at 10:26

## 2024-05-13 RX ADMIN — FENTANYL CITRATE 50 MCG: 50 INJECTION, SOLUTION INTRAMUSCULAR; INTRAVENOUS at 08:33

## 2024-05-13 RX ADMIN — Medication 200 MCG: at 09:04

## 2024-05-13 RX ADMIN — METRONIDAZOLE 500 MG: 500 INJECTION, SOLUTION INTRAVENOUS at 07:38

## 2024-05-13 RX ADMIN — ONDANSETRON 4 MG: 2 INJECTION INTRAMUSCULAR; INTRAVENOUS at 07:43

## 2024-05-13 RX ADMIN — Medication 10 MG: at 09:48

## 2024-05-13 RX ADMIN — ACETAMINOPHEN 650 MG: 325 TABLET ORAL at 17:08

## 2024-05-13 RX ADMIN — PROPOFOL 50 MCG/KG/MIN: 10 INJECTION, EMULSION INTRAVENOUS at 08:03

## 2024-05-13 RX ADMIN — ALBUMIN HUMAN 250 ML: 0.05 INJECTION, SOLUTION INTRAVENOUS at 13:35

## 2024-05-13 RX ADMIN — FUROSEMIDE 10 MG: 10 INJECTION, SOLUTION INTRAMUSCULAR; INTRAVENOUS at 13:23

## 2024-05-13 RX ADMIN — Medication 100 MCG: at 10:35

## 2024-05-13 RX ADMIN — SODIUM CHLORIDE, POTASSIUM CHLORIDE, SODIUM LACTATE AND CALCIUM CHLORIDE 50 ML/HR: 600; 310; 30; 20 INJECTION, SOLUTION INTRAVENOUS at 17:08

## 2024-05-13 RX ADMIN — METRONIDAZOLE 500 MG: 500 INJECTION, SOLUTION INTRAVENOUS at 07:01

## 2024-05-13 RX ADMIN — ONDANSETRON 4 MG: 2 INJECTION INTRAMUSCULAR; INTRAVENOUS at 14:42

## 2024-05-13 RX ADMIN — SUGAMMADEX 200 MG: 100 INJECTION, SOLUTION INTRAVENOUS at 14:45

## 2024-05-13 RX ADMIN — FENTANYL CITRATE 50 MCG: 50 INJECTION, SOLUTION INTRAMUSCULAR; INTRAVENOUS at 07:50

## 2024-05-13 RX ADMIN — Medication 20 MG: at 08:33

## 2024-05-13 RX ADMIN — CEFAZOLIN SODIUM 2 G: 2 INJECTION, SOLUTION INTRAVENOUS at 08:00

## 2024-05-13 RX ADMIN — METHYLENE BLUE 15 MG: 5 INJECTION INTRAVENOUS at 13:03

## 2024-05-13 RX ADMIN — LIDOCAINE HYDROCHLORIDE 100 MG: 20 INJECTION, SOLUTION EPIDURAL; INFILTRATION; INTRACAUDAL; PERINEURAL at 07:50

## 2024-05-13 RX ADMIN — HYDROMORPHONE HYDROCHLORIDE: 10 INJECTION, SOLUTION INTRAMUSCULAR; INTRAVENOUS; SUBCUTANEOUS at 15:51

## 2024-05-13 RX ADMIN — DEXAMETHASONE SODIUM PHOSPHATE 8 MG: 4 INJECTION, SOLUTION INTRAMUSCULAR; INTRAVENOUS at 08:00

## 2024-05-13 RX ADMIN — PROPOFOL 150 MG: 10 INJECTION, EMULSION INTRAVENOUS at 07:50

## 2024-05-13 RX ADMIN — Medication 10 MG: at 09:16

## 2024-05-13 RX ADMIN — INSULIN HUMAN 6 UNITS: 100 INJECTION, SOLUTION PARENTERAL at 18:32

## 2024-05-13 RX ADMIN — Medication 50 MG: at 07:50

## 2024-05-13 RX ADMIN — Medication 10 MG: at 10:10

## 2024-05-13 RX ADMIN — Medication 150 MCG: at 08:40

## 2024-05-13 RX ADMIN — METOPROLOL TARTRATE 50 MG: 50 TABLET, FILM COATED ORAL at 21:59

## 2024-05-13 RX ADMIN — HYDROMORPHONE HYDROCHLORIDE 0.2 MG: 0.2 INJECTION, SOLUTION INTRAMUSCULAR; INTRAVENOUS; SUBCUTANEOUS at 15:42

## 2024-05-13 SDOH — SOCIAL STABILITY: SOCIAL INSECURITY: HAS ANYONE EVER THREATENED TO HURT YOUR FAMILY OR YOUR PETS?: NO

## 2024-05-13 SDOH — SOCIAL STABILITY: SOCIAL INSECURITY: DO YOU FEEL ANYONE HAS EXPLOITED OR TAKEN ADVANTAGE OF YOU FINANCIALLY OR OF YOUR PERSONAL PROPERTY?: NO

## 2024-05-13 SDOH — SOCIAL STABILITY: SOCIAL INSECURITY: ARE THERE ANY APPARENT SIGNS OF INJURIES/BEHAVIORS THAT COULD BE RELATED TO ABUSE/NEGLECT?: NO

## 2024-05-13 SDOH — SOCIAL STABILITY: SOCIAL INSECURITY: ABUSE: ADULT

## 2024-05-13 SDOH — SOCIAL STABILITY: SOCIAL INSECURITY: HAVE YOU HAD THOUGHTS OF HARMING ANYONE ELSE?: NO

## 2024-05-13 SDOH — SOCIAL STABILITY: SOCIAL INSECURITY: DOES ANYONE TRY TO KEEP YOU FROM HAVING/CONTACTING OTHER FRIENDS OR DOING THINGS OUTSIDE YOUR HOME?: NO

## 2024-05-13 SDOH — SOCIAL STABILITY: SOCIAL INSECURITY: ARE YOU OR HAVE YOU BEEN THREATENED OR ABUSED PHYSICALLY, EMOTIONALLY, OR SEXUALLY BY ANYONE?: NO

## 2024-05-13 SDOH — SOCIAL STABILITY: SOCIAL INSECURITY: HAVE YOU HAD ANY THOUGHTS OF HARMING ANYONE ELSE?: NO

## 2024-05-13 SDOH — SOCIAL STABILITY: SOCIAL INSECURITY: DO YOU FEEL UNSAFE GOING BACK TO THE PLACE WHERE YOU ARE LIVING?: NO

## 2024-05-13 SDOH — HEALTH STABILITY: MENTAL HEALTH: CURRENT SMOKER: 0

## 2024-05-13 SDOH — SOCIAL STABILITY: SOCIAL INSECURITY: WERE YOU ABLE TO COMPLETE ALL THE BEHAVIORAL HEALTH SCREENINGS?: YES

## 2024-05-13 ASSESSMENT — PAIN SCALES - GENERAL
PAINLEVEL_OUTOF10: 0 - NO PAIN
PAINLEVEL_OUTOF10: 5 - MODERATE PAIN
PAINLEVEL_OUTOF10: 6
PAINLEVEL_OUTOF10: 0 - NO PAIN
PAINLEVEL_OUTOF10: 0 - NO PAIN

## 2024-05-13 ASSESSMENT — ENCOUNTER SYMPTOMS
ABDOMINAL DISTENTION: 0
CHEST TIGHTNESS: 0
UNEXPECTED WEIGHT CHANGE: 0
DIARRHEA: 0
ABDOMINAL PAIN: 0
ANAL BLEEDING: 0
RECTAL PAIN: 0
CONSTIPATION: 0
CHILLS: 0
PALPITATIONS: 0
FEVER: 0
FATIGUE: 0
WHEEZING: 0
SHORTNESS OF BREATH: 0
VOMITING: 0
COUGH: 0
WEAKNESS: 0
FREQUENCY: 0
AGITATION: 0
DIZZINESS: 1
DIFFICULTY URINATING: 0
NAUSEA: 0
APPETITE CHANGE: 0
LIGHT-HEADEDNESS: 1
BLOOD IN STOOL: 0
CHOKING: 0
HEMATURIA: 0
DYSURIA: 0
ACTIVITY CHANGE: 0

## 2024-05-13 ASSESSMENT — PAIN - FUNCTIONAL ASSESSMENT
PAIN_FUNCTIONAL_ASSESSMENT: 0-10
PAIN_FUNCTIONAL_ASSESSMENT: UNABLE TO SELF-REPORT
PAIN_FUNCTIONAL_ASSESSMENT: 0-10
PAIN_FUNCTIONAL_ASSESSMENT: 0-10

## 2024-05-13 ASSESSMENT — COGNITIVE AND FUNCTIONAL STATUS - GENERAL
DRESSING REGULAR UPPER BODY CLOTHING: A LITTLE
DAILY ACTIVITIY SCORE: 20
DRESSING REGULAR UPPER BODY CLOTHING: A LITTLE
MOBILITY SCORE: 19
PERSONAL GROOMING: A LITTLE
MOVING FROM LYING ON BACK TO SITTING ON SIDE OF FLAT BED WITH BEDRAILS: A LITTLE
DAILY ACTIVITIY SCORE: 18
EATING MEALS: A LITTLE
WALKING IN HOSPITAL ROOM: A LITTLE
MOVING TO AND FROM BED TO CHAIR: A LITTLE
MOVING TO AND FROM BED TO CHAIR: A LITTLE
HELP NEEDED FOR BATHING: A LITTLE
CLIMB 3 TO 5 STEPS WITH RAILING: A LITTLE
DRESSING REGULAR LOWER BODY CLOTHING: A LITTLE
PATIENT BASELINE BEDBOUND: NO
MOBILITY SCORE: 18
TOILETING: A LITTLE
TURNING FROM BACK TO SIDE WHILE IN FLAT BAD: A LITTLE
STANDING UP FROM CHAIR USING ARMS: A LITTLE
CLIMB 3 TO 5 STEPS WITH RAILING: A LITTLE
STANDING UP FROM CHAIR USING ARMS: A LITTLE
HELP NEEDED FOR BATHING: A LITTLE
TURNING FROM BACK TO SIDE WHILE IN FLAT BAD: A LITTLE
TOILETING: A LITTLE
DRESSING REGULAR LOWER BODY CLOTHING: A LITTLE
WALKING IN HOSPITAL ROOM: A LITTLE

## 2024-05-13 ASSESSMENT — ACTIVITIES OF DAILY LIVING (ADL)
JUDGMENT_ADEQUATE_SAFELY_COMPLETE_DAILY_ACTIVITIES: YES
DRESSING YOURSELF: INDEPENDENT
FEEDING YOURSELF: INDEPENDENT
PATIENT'S MEMORY ADEQUATE TO SAFELY COMPLETE DAILY ACTIVITIES?: YES
BATHING: INDEPENDENT
HEARING - RIGHT EAR: FUNCTIONAL
TOILETING: INDEPENDENT
ADEQUATE_TO_COMPLETE_ADL: YES
GROOMING: INDEPENDENT
LACK_OF_TRANSPORTATION: NO
HEARING - LEFT EAR: FUNCTIONAL
WALKS IN HOME: INDEPENDENT

## 2024-05-13 ASSESSMENT — PATIENT HEALTH QUESTIONNAIRE - PHQ9
1. LITTLE INTEREST OR PLEASURE IN DOING THINGS: NOT AT ALL
2. FEELING DOWN, DEPRESSED OR HOPELESS: NOT AT ALL
SUM OF ALL RESPONSES TO PHQ9 QUESTIONS 1 & 2: 0

## 2024-05-13 ASSESSMENT — LIFESTYLE VARIABLES
HOW OFTEN DO YOU HAVE A DRINK CONTAINING ALCOHOL: NEVER
HOW OFTEN DO YOU HAVE 6 OR MORE DRINKS ON ONE OCCASION: NEVER
AUDIT-C TOTAL SCORE: 0
AUDIT-C TOTAL SCORE: 0
SUBSTANCE_ABUSE_PAST_12_MONTHS: NO
PRESCIPTION_ABUSE_PAST_12_MONTHS: NO
SKIP TO QUESTIONS 9-10: 1
HOW MANY STANDARD DRINKS CONTAINING ALCOHOL DO YOU HAVE ON A TYPICAL DAY: PATIENT DOES NOT DRINK

## 2024-05-13 ASSESSMENT — PAIN DESCRIPTION - DESCRIPTORS: DESCRIPTORS: SORE;TENDER

## 2024-05-13 ASSESSMENT — PAIN DESCRIPTION - LOCATION: LOCATION: ABDOMEN

## 2024-05-13 NOTE — ANESTHESIA POSTPROCEDURE EVALUATION
"Patient: Vanessa Son \"Jodie\"    Procedure Summary       Date: 05/13/24 Room / Location: [facility] OR 09 / [facility] OR    Anesthesia Start: 0738 Anesthesia Stop: 1509    Procedures:       Resection Sigmoid Colon (Abdomen)      Sigmoidoscopy (Abdomen)      Cystoscopy with Retrograde Pyelogram (Bilateral) Diagnosis:       Colovaginal fistula      (Colovaginal fistula [N82.4])    Surgeons: Haim Argueta MD; Vik Osborne MD Responsible Provider: Parker Ron MD    Anesthesia Type: general ASA Status: 3            Anesthesia Type: general    Vitals Value Taken Time   /72 05/13/24 1509   Temp 36.0 05/13/24 1509   Pulse 75 05/13/24 1508   Resp 13 05/13/24 1508   SpO2 100 % 05/13/24 1508   Vitals shown include unfiled device data.    Anesthesia Post Evaluation    Patient location during evaluation: PACU  Patient participation: complete - patient cannot participate  Level of consciousness: sleepy but conscious  Pain management: adequate  Multimodal analgesia pain management approach  Airway patency: patent  Two or more strategies used to mitigate risk of obstructive sleep apnea  Cardiovascular status: acceptable  Respiratory status: acceptable, face mask, room air, spontaneous ventilation and unassisted  Hydration status: acceptable  Postoperative Nausea and Vomiting: none  Comments: Handoff to Thu PACU        There were no known notable events for this encounter.    "

## 2024-05-13 NOTE — ANESTHESIA PROCEDURE NOTES
Peripheral IV  Date/Time: 5/13/2024 8:04 AM  Inserted by: ANDRZEJ Arellano-TOMMIE    Placement  Needle size: 20 G  Laterality: right  Location: wrist  Local anesthetic: none  Site prep: chlorhexidine  Technique: anatomical landmarks  Attempts: 2  Difficult Venous Access: Yes  Unsuccessful Attempt Location(s): right forearm

## 2024-05-13 NOTE — BRIEF OP NOTE
"Date: 2024  OR Location: [facility] OR    Name: Vanessa Son \"Jodie\", : 1950, Age: 74 y.o., MRN: [id number], Sex: female    Diagnosis  Pre-op Diagnosis     * Colovaginal fistula [N82.4] Post-op Diagnosis     * Colovaginal fistula [N82.4]     Procedures  Low anterior resection    Splenic flexure mobilization     Flexible sigmoidoscopy     (Urology - performed cystogram and cystoscopy)     Surgeons   Panel 1:     * Haim Argueta - Primary  Panel 2:     * Vik Osborne - Primary    Resident/Fellow/Other Assistant:  Iraj Albarran MD     Procedure Summary  Anesthesia: General  ASA: III  Anesthesia Staff: Anesthesiologist: Parker Ron MD  CRNA: ANDRZEJ Arellano-CRNA  C-AA: ERYNA Voss  Estimated Blood Loss: 200mL  Intra-op Medications:   Administrations occurring from 0730 to 1115 on 24:   Medication Name Total Dose   lactated Ringer's infusion Cannot be calculated   ceFAZolin in dextrose (iso-os) (Ancef) IVPB 2 g 2 g              Anesthesia Record               Intraprocedure I/O Totals          Intake    Propofol Drip 0.00 mL    The total shown is the total volume documented since Anesthesia Start was filed.    LR infusion 1000.00 mL    lactated Ringer's infusion 2000.00 mL    albumin human bottle 5% 250.00 mL    ceFAZolin in dextrose (iso-os) (Ancef) IVPB 2 g 200.00 mL    metroNIDAZOLE (Flagyl)  mg/100 mL (premix) 100.00 mL    Total Intake 3550 mL       Output    Urine 350 mL    Est. Blood Loss 200 mL    NG/OG Tube Output 20 mL    Total Output 570 mL       Net    Net Volume 2980 mL          Specimen:   ID Type Source Tests Collected by Time   1 : SIGMOID COLON AND ANASTOMOTIC DONUTS Tissue COLON - SIGMOID RESECTION SURGICAL PATHOLOGY EXAM Haim Argueta MD 2024 1010        Staff:   Circulator: Lakeshia Moore RN; Brielle Baker RN  Scrub Person: Marylou Holloway; Haim Covarrubias; Vince Villaseñor          Findings: Sigmoid with dense side wall adhesions, fistula " taken off vagina with obvious fistula opening. End to end colorectal anastomosis, no tension, negative leak test and normal flexible sigmoidoscopy. Urology performed cystoscopy and cystogram to rule out ureter injury, which was negative.     Complications:  None; patient tolerated the procedure well.     Disposition: PACU - hemodynamically stable.  Condition: stable  Specimens Collected:   ID Type Source Tests Collected by Time   1 : SIGMOID COLON AND ANASTOMOTIC DONUTS Tissue COLON - SIGMOID RESECTION SURGICAL PATHOLOGY EXAM Haim Argueta MD 5/13/2024 1010     Attending Attestation:     Haim Argueta  Phone Number: [phone]

## 2024-05-13 NOTE — OP NOTE
"  Date: 2024  OR Location: [facility] OR    Name: Vnaessa Son \"Jodie\", : 1950, Age: 74 y.o., MRN: [id number], Sex: female    Diagnosis  Pre-op Diagnosis     * Colovaginal fistula [N82.4] Post-op Diagnosis     * Colovaginal fistula [N82.4]     Procedures  1.  Cystoscopy and bilateral retrograde pyelogram  2.  Cystogram    Surgeons   Panel 1:     * Haim Argueta - Primary  Panel 2:     * Vik Osborne - Primary    Resident/Fellow/Other Assistant:  Surgeons and Role:  * No surgeons found with a matching role *    Procedure Summary  Anesthesia: General  ASA: III  Anesthesia Staff:   Anesthesiologist: Parker Ron MD  CRNA: ANDRZEJ Arellano-CRNA  C-AA: REYNA Voss  Estimated Blood Loss: Minimal  Intra-op Medications:   Medication Name Total Dose   sodium chloride 0.9 % irrigation solution 3,000 mL   ceFAZolin in dextrose (iso-os) (Ancef) IVPB 2 g 2 g              Anesthesia Record               Intraprocedure I/O Totals       None           Specimen:   Order Name Source Comment Collection Info Order Time   SURGICAL PATHOLOGY EXAM COLON - SIGMOID RESECTION Pre-op diagnosis:  Colovaginal fistula [N82.4] Collected By: Haim Argueta MD 2024 12:13 PM       Staff:   Circulator: Lakeshia Moore RN; Brielle Baker RN  Scrub Person: Marylou Holloway; Haim Covarrubias; Vince Villaseñor         Drains and/or Catheters:   Closed/Suction Drain Abdomen 15 Fr. (Active)   Dressing Status Clean;Dry 24 1507   Drainage Appearance Bloody 24 1507   Status To bulb suction 24 1507       Urethral Catheter 16 Fr. (Active)   Site Assessment Clean;Skin intact 24 1507   Collection Container Standard drainage bag 24 1507   Securement Method Securing device (Describe) 24 1507       Tourniquet Times:         Implants:     Findings: Normal bilateral retrograde pyelograms; normal cystogram    Indications: Vanessa Son \"Valerie" is an 74 y.o. female who is having surgery for " Colovaginal fistula [N82.4].  Patient was undergoing a sigmoid colectomy today for colovaginal fistula.  At the completion of resection of the colon, it was noted that the urine was hematuric.  There was concern for possible injury to ureters or bladder.  Intraoperative urologic consultation was requested.  There was no obvious injury noted.  I recommended cystoscopy and bilateral retrogrades to fully evaluate along with a cystogram.    Procedure Details: After the abdominal incision was closed, patient was repositioned in the dorsolithotomy position.  The previously placed catheter was removed.  Her genitalia were then prepped and draped in usual sterile fashion.    A 22 Angolan cystoscope was passed atraumatically per the urethra.  The bladder was inspected.  Along the left posterior wall the bladder, there was a small area of irritation/erythema.  The remainder of the bladder mucosa appeared normal.  No tumors, clots, stones, or foreign bodies were seen.  The right and left ureteral orifice were effluxing clear urine.    Attention was turned to the left ureteral orifice.  It was cannulated with a 5 Angolan open-ended catheter and a retrograde pyelogram was performed.  This was normal.  No hydronephrosis was noted.  There was no filling defects.  No extravasation was a seen.  Catheter was removed and there was prompt drainage into the bladder.  In a similar fashion, the right ureteral orifice was cannulated.  Retrograde pyelogram was performed.  This was also normal.  There was no hydronephrosis, filling defect, or extravasation.  Bladder was drained and the cystoscope was removed.  A 16 Angolan Godoy catheter was then placed.  The bladder was filled with 350 cc of dilute contrast.  Cystogram was performed.  No extravasation was seen.  Bladder was drained.  No residual contrast or postdrainage extravasation was seen.  At this point, the catheter was connected to gravity drainage.  The patient was awakened and taken  to the PACU in stable condition.    Complications:  None; patient tolerated the procedure well.    Disposition: PACU - hemodynamically stable.  Condition: stable     Vik Osborne MD

## 2024-05-13 NOTE — ANESTHESIA PREPROCEDURE EVALUATION
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 : Y  Date of Last Liquid: 05/12/24  Time of Last Liquid: 2100  Date of Last Solid: 05/11/24  Time of Last Solid: 1800  Last Intake Type: Carbohydrate drink  Time of Last Void: 0633         Physical Exam    Airway  Mallampati: II  TM distance: >3 FB  Neck ROM: full     Cardiovascular   Rhythm: regular  Rate: normal     Dental - normal exam     Pulmonary   Breath sounds clear to auscultation     Abdominal            Anesthesia Plan    History of general anesthesia?: yes  History of complications of general anesthesia?: no    ASA 3     general     The patient is not a current smoker.    intravenous induction   Anesthetic plan and risks discussed with patient.    Plan discussed with CRNA.      "

## 2024-05-13 NOTE — NURSING NOTE
1630- pt arrived to ortho at this time from PACU    EOS: this pt remained stable since arrival from PACU. VSS. Pain managed with PCA pump and tylenol. Continuous pulse ox applied to ensure patient's respiratory status is stable. Godoy and DIEGO producing adequate output. Safety maintained with nonskid footwear and bed alarms. Call light is in reach. Accuchecks ordered to maintain sugar levels since pt is a diabetic. No questions or needs at this time.
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
## 2024-05-13 NOTE — ANESTHESIA PROCEDURE NOTES
Airway  Date/Time: 5/13/2024 7:53 AM  Urgency: elective    Airway not difficult    Staffing  Performed: CRNA   Authorized by: Parker Ron MD    Performed by: ANDRZEJ Arellano-TOMMIE  Patient location during procedure: OR    Indications and Patient Condition  Indications for airway management: anesthesia  Spontaneous Ventilation: absent  Sedation level: deep  Preoxygenated: yes  Patient position: sniffing  Mask difficulty assessment: 2 - vent by mask + OA or adjuvant +/- NMBA    Final Airway Details  Final airway type: endotracheal airway      Successful airway: ETT  Cuffed: yes   Successful intubation technique: video laryngoscopy (Simmons)  Facilitating devices/methods: intubating stylet  Blade size: #3  ETT size (mm): 7.0  Cormack-Lehane Classification: grade I - full view of glottis  Placement verified by: chest auscultation, capnometry and palpation of cuff   Cuff volume (mL): 7  Measured from: lips  ETT to lips (cm): 22  Number of attempts at approach: 1

## 2024-05-14 LAB
ALBUMIN SERPL BCP-MCNC: 3.2 G/DL (ref 3.4–5)
ANION GAP SERPL CALC-SCNC: 13 MMOL/L (ref 10–20)
BLOOD EXPIRATION DATE: NORMAL
BLOOD EXPIRATION DATE: NORMAL
BUN SERPL-MCNC: 39 MG/DL (ref 6–23)
CALCIUM SERPL-MCNC: 8.4 MG/DL (ref 8.6–10.3)
CHLORIDE SERPL-SCNC: 97 MMOL/L (ref 98–107)
CO2 SERPL-SCNC: 27 MMOL/L (ref 21–32)
CREAT SERPL-MCNC: 1.22 MG/DL (ref 0.5–1.05)
DISPENSE STATUS: NORMAL
DISPENSE STATUS: NORMAL
EGFRCR SERPLBLD CKD-EPI 2021: 47 ML/MIN/1.73M*2
ERYTHROCYTE [DISTWIDTH] IN BLOOD BY AUTOMATED COUNT: 13.7 % (ref 11.5–14.5)
GLUCOSE BLD MANUAL STRIP-MCNC: 267 MG/DL (ref 74–99)
GLUCOSE BLD MANUAL STRIP-MCNC: 300 MG/DL (ref 74–99)
GLUCOSE BLD MANUAL STRIP-MCNC: 303 MG/DL (ref 74–99)
GLUCOSE BLD MANUAL STRIP-MCNC: 318 MG/DL (ref 74–99)
GLUCOSE SERPL-MCNC: 316 MG/DL (ref 74–99)
HCT VFR BLD AUTO: 33.2 % (ref 36–46)
HGB BLD-MCNC: 11.5 G/DL (ref 12–16)
MAGNESIUM SERPL-MCNC: 1.67 MG/DL (ref 1.6–2.4)
MCH RBC QN AUTO: 30.7 PG (ref 26–34)
MCHC RBC AUTO-ENTMCNC: 34.6 G/DL (ref 32–36)
MCV RBC AUTO: 89 FL (ref 80–100)
NRBC BLD-RTO: 0 /100 WBCS (ref 0–0)
PHOSPHATE SERPL-MCNC: 5.4 MG/DL (ref 2.5–4.9)
PLATELET # BLD AUTO: 151 X10*3/UL (ref 150–450)
POTASSIUM SERPL-SCNC: 3.9 MMOL/L (ref 3.5–5.3)
PRODUCT BLOOD TYPE: 600
PRODUCT BLOOD TYPE: 600
PRODUCT CODE: NORMAL
PRODUCT CODE: NORMAL
RBC # BLD AUTO: 3.74 X10*6/UL (ref 4–5.2)
SODIUM SERPL-SCNC: 133 MMOL/L (ref 136–145)
UNIT ABO: NORMAL
UNIT ABO: NORMAL
UNIT NUMBER: NORMAL
UNIT NUMBER: NORMAL
UNIT RH: NORMAL
UNIT RH: NORMAL
UNIT VOLUME: 350
UNIT VOLUME: 350
WBC # BLD AUTO: 8.4 X10*3/UL (ref 4.4–11.3)
XM INTEP: NORMAL
XM INTEP: NORMAL

## 2024-05-14 PROCEDURE — 85027 COMPLETE CBC AUTOMATED: CPT | Performed by: STUDENT IN AN ORGANIZED HEALTH CARE EDUCATION/TRAINING PROGRAM

## 2024-05-14 PROCEDURE — 36415 COLL VENOUS BLD VENIPUNCTURE: CPT | Performed by: STUDENT IN AN ORGANIZED HEALTH CARE EDUCATION/TRAINING PROGRAM

## 2024-05-14 PROCEDURE — 2500000004 HC RX 250 GENERAL PHARMACY W/ HCPCS (ALT 636 FOR OP/ED): Performed by: NURSE PRACTITIONER

## 2024-05-14 PROCEDURE — 2500000005 HC RX 250 GENERAL PHARMACY W/O HCPCS: Performed by: STUDENT IN AN ORGANIZED HEALTH CARE EDUCATION/TRAINING PROGRAM

## 2024-05-14 PROCEDURE — 2500000004 HC RX 250 GENERAL PHARMACY W/ HCPCS (ALT 636 FOR OP/ED): Performed by: STUDENT IN AN ORGANIZED HEALTH CARE EDUCATION/TRAINING PROGRAM

## 2024-05-14 PROCEDURE — 1100000001 HC PRIVATE ROOM DAILY

## 2024-05-14 PROCEDURE — 2500000001 HC RX 250 WO HCPCS SELF ADMINISTERED DRUGS (ALT 637 FOR MEDICARE OP): Performed by: STUDENT IN AN ORGANIZED HEALTH CARE EDUCATION/TRAINING PROGRAM

## 2024-05-14 PROCEDURE — 82947 ASSAY GLUCOSE BLOOD QUANT: CPT

## 2024-05-14 PROCEDURE — 83735 ASSAY OF MAGNESIUM: CPT | Performed by: STUDENT IN AN ORGANIZED HEALTH CARE EDUCATION/TRAINING PROGRAM

## 2024-05-14 PROCEDURE — 80069 RENAL FUNCTION PANEL: CPT | Performed by: STUDENT IN AN ORGANIZED HEALTH CARE EDUCATION/TRAINING PROGRAM

## 2024-05-14 PROCEDURE — 2500000001 HC RX 250 WO HCPCS SELF ADMINISTERED DRUGS (ALT 637 FOR MEDICARE OP): Performed by: NURSE PRACTITIONER

## 2024-05-14 RX ORDER — OXYCODONE HYDROCHLORIDE 10 MG/1
5 TABLET ORAL EVERY 4 HOURS PRN
Status: DISCONTINUED | OUTPATIENT
Start: 2024-05-14 | End: 2024-05-18 | Stop reason: HOSPADM

## 2024-05-14 RX ORDER — ONDANSETRON HYDROCHLORIDE 2 MG/ML
4 INJECTION, SOLUTION INTRAVENOUS EVERY 4 HOURS PRN
Status: DISCONTINUED | OUTPATIENT
Start: 2024-05-14 | End: 2024-05-18 | Stop reason: HOSPADM

## 2024-05-14 RX ORDER — OXYCODONE HYDROCHLORIDE 10 MG/1
10 TABLET ORAL EVERY 4 HOURS PRN
Status: DISCONTINUED | OUTPATIENT
Start: 2024-05-14 | End: 2024-05-18 | Stop reason: HOSPADM

## 2024-05-14 RX ADMIN — ENOXAPARIN SODIUM 40 MG: 40 INJECTION SUBCUTANEOUS at 08:52

## 2024-05-14 RX ADMIN — INSULIN HUMAN 6 UNITS: 100 INJECTION, SOLUTION PARENTERAL at 14:10

## 2024-05-14 RX ADMIN — ACETAMINOPHEN 650 MG: 325 TABLET ORAL at 04:01

## 2024-05-14 RX ADMIN — INSULIN HUMAN 8 UNITS: 100 INJECTION, SOLUTION PARENTERAL at 08:50

## 2024-05-14 RX ADMIN — INSULIN HUMAN 6 UNITS: 100 INJECTION, SOLUTION PARENTERAL at 18:34

## 2024-05-14 RX ADMIN — ACETAMINOPHEN 650 MG: 325 TABLET ORAL at 18:34

## 2024-05-14 RX ADMIN — ONDANSETRON 4 MG: 2 INJECTION INTRAMUSCULAR; INTRAVENOUS at 11:31

## 2024-05-14 RX ADMIN — Medication 96 PERCENT: at 08:00

## 2024-05-14 RX ADMIN — SODIUM CHLORIDE, POTASSIUM CHLORIDE, SODIUM LACTATE AND CALCIUM CHLORIDE 50 ML/HR: 600; 310; 30; 20 INJECTION, SOLUTION INTRAVENOUS at 07:35

## 2024-05-14 RX ADMIN — OXYCODONE HYDROCHLORIDE 5 MG: 10 TABLET ORAL at 21:11

## 2024-05-14 RX ADMIN — AMLODIPINE BESYLATE 5 MG: 5 TABLET ORAL at 08:52

## 2024-05-14 RX ADMIN — ACETAMINOPHEN 650 MG: 325 TABLET ORAL at 23:42

## 2024-05-14 RX ADMIN — METOPROLOL TARTRATE 50 MG: 50 TABLET, FILM COATED ORAL at 21:11

## 2024-05-14 RX ADMIN — METOPROLOL TARTRATE 50 MG: 50 TABLET, FILM COATED ORAL at 08:52

## 2024-05-14 RX ADMIN — ACETAMINOPHEN 650 MG: 325 TABLET ORAL at 11:31

## 2024-05-14 ASSESSMENT — COGNITIVE AND FUNCTIONAL STATUS - GENERAL
STANDING UP FROM CHAIR USING ARMS: A LITTLE
CLIMB 3 TO 5 STEPS WITH RAILING: A LITTLE
WALKING IN HOSPITAL ROOM: A LITTLE
MOVING TO AND FROM BED TO CHAIR: A LITTLE
MOBILITY SCORE: 20
DRESSING REGULAR LOWER BODY CLOTHING: A LITTLE
TOILETING: A LITTLE
DAILY ACTIVITIY SCORE: 22

## 2024-05-14 ASSESSMENT — PAIN SCALES - GENERAL
PAINLEVEL_OUTOF10: 2
PAINLEVEL_OUTOF10: 3
PAINLEVEL_OUTOF10: 4
PAINLEVEL_OUTOF10: 2

## 2024-05-14 ASSESSMENT — PAIN - FUNCTIONAL ASSESSMENT
PAIN_FUNCTIONAL_ASSESSMENT: 0-10

## 2024-05-14 NOTE — OP NOTE
"Resection Sigmoid Colon, Sigmoidoscopy Operative Note     Date: 2024  OR Location: [facility]    Name: Vanessa Son \"Jodie\", : 1950, Age: 74 y.o., MRN: [id number], Sex: female    Diagnosis  Pre-op Diagnosis     * Colovaginal fistula [N82.4] Post-op Diagnosis     * Colovaginal fistula [N82.4]     Procedures  Open low anterior resection (Colorectal)  Takedown of colovaginal fistula (Colorectal)  Mobilization of splenic flexure (Colorectal)  29 EEA colorectal anastomosis (Colorectal)  Flexible sigmoidoscopy (Colorectal)  Cystoscopy and bilateral retrograde peylogram (Urology)  Cystogram (Urology)    Surgeons   Panel 1:     * Haim Argueta - Primary  Panel 2:     * Vik Osborne - Primary    Resident/Fellow/Other Assistant:  Surgeons and Role:  * No surgeons found with a matching role *    Procedure Summary  Anesthesia: General  ASA: III  Anesthesia Staff: Anesthesiologist: Parker Ron MD  CRNA: ANDRZEJ Arellano-CRNA  C-AA: REYNA Voss  Estimated Blood Loss: 200mL  Intra-op Medications:   Administrations occurring from 0730 to 1115 on 24:   Medication Name Total Dose   lactated Ringer's infusion Cannot be calculated   ceFAZolin in dextrose (iso-os) (Ancef) IVPB 2 g 2 g              Anesthesia Record               Intraprocedure I/O Totals          Intake    Propofol Drip 229.99 mL    The total shown is the total volume documented since Anesthesia Start was filed.    LR infusion 1000.00 mL    lactated Ringer's infusion 2800.00 mL    albumin human bottle 5% 250.00 mL    ceFAZolin in dextrose (iso-os) (Ancef) IVPB 2 g 200.00 mL    metroNIDAZOLE (Flagyl)  mg/100 mL (premix) 100.00 mL    Total Intake 4579.99 mL       Output    Urine 350 mL    Est. Blood Loss 200 mL    NG/OG Tube Output 20 mL    Total Output 570 mL       Net    Net Volume 4009.99 mL          Specimen:   ID Type Source Tests Collected by Time   1 : SIGMOID COLON AND ANASTOMOTIC DONUTS Tissue COLON - SIGMOID " RESECTION SURGICAL PATHOLOGY EXAM Haim Argueta MD 5/13/2024 1010        Staff:   Circulator: Lakeshia Moore, ISABEL; Brielle Baker RN  Scrub Person: Marylou Holloway; Haim Covarrubias; Vince Villaseñor         Drains and/or Catheters:   Closed/Suction Drain Abdomen 15 Fr. (Active)   Dressing Status Clean;Dry 05/13/24 1617   Drainage Appearance Bloody 05/13/24 1617   Status To bulb suction 05/13/24 1507   Output (mL) 30 mL 05/13/24 2000       Urethral Catheter 16 Fr. (Active)   Site Assessment Clean;Skin intact 05/13/24 1617   Collection Container Standard drainage bag 05/13/24 1617   Securement Method Securing device (Describe) 05/13/24 1617   Output (mL) 125 mL 05/13/24 2000       Tourniquet Times:         Implants:     Findings: Sigmoid with dense side wall adhesions, fistula taken off vagina with obvious fistula opening. End to end colorectal anastomosis, no tension, negative leak test and normal flexible sigmoidoscopy. Urology performed cystoscopy and cystogram to rule out ureter injury, which was negative.     Indications: Jodie Son is an 74 y.o. female who is having surgery for Colovaginal fistula [N82.4].     The patient was seen in the preoperative area. The risks, benefits, complications, treatment options, non-operative alternatives, expected recovery and outcomes were discussed with the patient. The possibilities of reaction to medication, pulmonary aspiration, injury to surrounding structures, bleeding, recurrent infection, the need for additional procedures, failure to diagnose a condition, and creating a complication requiring transfusion or operation were discussed with the patient. The patient concurred with the proposed plan, giving informed consent.  The site of surgery was properly noted/marked if necessary per policy. The patient has been actively warmed in preoperative area. Preoperative antibiotics have been ordered and given within 1 hours of incision. Venous thrombosis prophylaxis  have been ordered including bilateral sequential compression devices    Procedure Details: Safety checklist was performed in preoperative area confirming correct patient and correct procedure.  Patient was brought to the operating room.  Sequential compression devices were applied to bilateral lower extremities.  Following induction of general anesthesia, the patient was placed in lithotomy position using the yellowfin stirrups.  Bilateral arms were abducted and gently fixed armboards.  A 16 Greek Godoy catheter was inserted under sterile technique.  The patient's abdomen was prepped with ChloraPrep and she was draped in the usual sterile fashion.  Timeout was performed, once again confirming correct patient and correct seizure.  Perioperative antibiotics were administered.    Midline incision was created and skin using #10 scalpel blade.  The incision incorporated the patient's previous midline incisional scar.  Electrocautery was used to carry the incision down through the soft tissues to the level of the fascia which was grasped and sharply divided between Kocher clamps using a #10 scalpel blade.  There were no adhesions at the fascial entry site.  There were no injuries incurred to the intra-abdominal viscera with fascial entry.  The fascial opening was then extended the full length of the skin incision which spanned from the epigastrium down to the pubic symphysis.  There were some adhesions between the small bowel and the anterior abdominal wall.  These adhesions were taken down sharply using Metzenbaum dissection.    The Lila retractor was now placed into the abdomen for retraction.  Additional adhesions between the small bowel and the right lower quadrant and right anterior pelvic walls were lysed using sharp Metzenbaum dissection and electrocautery.  Care was taken to ensure that there were no injuries to the bowel.  Once the adhesions were lysed, the small bowel was then retracted out of the pelvis and  packed into the superior abdomen.  The sigmoid colon was identified and found to course along the left lateral pelvic sidewall where there were dense adhesions towards the posterior bladder wall and vaginal cuff.  The sigmoid colon was initially mobilized along the white line of Toldt in the left lower quadrant lateral abdominal wall region.  This plane of dissection was then carried distally along the left lateral aspect of the sigmoid colon.  The anterior aspect of the sigmoid colon was then completely dissected free and mobilized off the vaginal cuff.  This was performed primarily using electrocautery.  During this dissection, the colovaginal fistula was taken down.  The fistulous opening on the anterior wall of the sigmoid colon was identified.  The sigmoid colon was then further mobilized away from the left lateral abdominal wall and left lower quadrant.  Of note, due to the extensive amount of adipose tissue and adhesions, the left ureter was not definitively identified.    At this point, the bowel was isolated immediately proximal and distal to the region of significant sigmoid colon thickening and edema.  Small windows were created at the mesenteric borders at the proximal and distal aspects.  The linear 75 mm blue load stapler was used to transect the colon at the proximal aspect.  The TX 60 mm blue load stapler was used to transect the sigmoid colon distally.  The intervening mesentery was then ligated and divided using the LigaSure device.  The sigmoid colon specimen was passed off the table for final pathology.    Inspection of the rectal stump staple line demonstrated good hemostasis.  The rectum was now sized with 25 mm, 28 mm, and 31 mm sizers.  There was difficulty advancing the sizers all the way up to the rectal stump staple line secondary to what appeared to be a sharp angulation of the rectum.  At this time, decision was made to direct attention towards complete mobilization of the descending  colon and the splenic flexure to provide adequate reach of the colonic conduit for planned creation of a colorectal anastomosis without tension.  As such, the descending colon was completely mobilized off the left lateral abdominal wall along the white line of Toldt.  This plane of dissection was carried proximally all the way to the level of the splenic flexure.  The lesser sac was then entered along the mid transverse colon region by dissecting the greater omentum off the superior border of the transverse colon.  This plane of dissection was then carried distally along the transverse colon meeting up with the previous point of dissection resulting in complete mobilization of the splenic flexure.  Of note the spleen was not visualized and was not injured.  There was a significant varices identified in the left upper quadrant and care was taken to avoid its injury.  With this mobilization, there was now excellent reach of the colonic conduit down to the pelvis for creation of a tension-free colorectal anastomosis.    Attention was now directed back down to the pelvis.  The rectum was once again size and again there was difficulty advancing the sizers all the way up to the rectal stump staple line.  A flexible sigmoidoscopy was performed.  There were no intraluminal obstructions.  At this time, decision was made to proceed with a side to end colorectal anastomosis.  The rectal stump staple line was transected using electrocautery.  Attempt was made to suture the 29 EEA anvil into the open rectal lumen using 0 Prolene placed in pursestring fashion.  However, the rectum at the site could not accommodate the size of the anvil.  Decision was thus made to take additional rectum down to the point at which the staplers could reach a new rectal stump staple line.  The rectum was mobilized in the plane between the mesorectum and the presacral fascia.  This plane of dissection was performed posteriorly and carried distally  along the rectum.  The right and left lateral planes of dissection were then developed.  The mesorectum was transected using LigaSure device and perpendicular fashion at the planned rectal transection site.  The rectum was then transected again using the TX 60 mm green load stapler.  This additional portion of rectum was passed off for final pathology with the previous sigmoid colon specimen.  The rectum was once again sized and now the 25 mm, 28 mm, and 31 mm sizers were able to be advanced to the new rectal stump staple line.  As such, decision was made to proceed with an end-to-end colorectal anastomosis.      The staple line of the colonic conduit was transected using electrocautery.  The anvil was now sutured into the colonic lumen in pursestring fashion using the 0 Prolene stitch.  The stapler was introduced through the anus and advanced to the top of the rectal stump staple line.  The pin was deployed anterior to the transverse staple line.  The pin and anvil were .  The stapler was slowly closed.  Care was taken to ensure proper not twisted orientation of the colonic conduit and that there were no adjacent pelvic structures introduced into the planned colorectal anastomotic staple line.  The stapler was then fired, opened, and removed from the anus.  There were 2 complete anastomotic donuts.  A flexible sigmoidoscopy was now performed.  There was good hemostasis of the intraluminal anastomotic staple line.  Insufflation of air across the staple line demonstrated no evidence of air leak from the submerged anastomosis.  The distal colon and rectum were then desufflated and the flexible sigmoidoscope removed from the anus.    At this time, patient was noted to have some blood in the urine.  Evaluation of the bladder demonstrated no evidence of bladder injury.  Reevaluation of the left and right lateral pelvic sidewalls failed to identify the right and left ureters.  There were no obvious ureteral  injuries.  There was no obvious welling of urine or fluid deep within the pelvis.  Methylene blue was intravenously administered and there was no evidence of staining within the pelvis.  Despite this, urology was consulted intraoperatively.  Plan was made to proceed with completion of the case followed by cystoscopy and evaluation of bilateral ureters.    The abdomen and pelvis were irrigated and suctioned dry.  A 15 Thai round drain was introduced into the abdomen via a stab incision.  The intra-abdominal component of the drain was placed deep within the pelvis anterior to the staple line of the colorectal anastomosis.  The external component of the drain was fixed to the skin using nylon stitch.  Preliminary count was performed and confirmed to be correct.  The midline fascia was now closed with 2 #1 looped PDS sutures beginning at each end meeting and tied in the middle.  The subcutaneous space was irrigated and suctioned dry.  Hemostasis was achieved with electrocautery.  Final surgical count was performed and confirmed to be correct.  The skin was then reapproximated using a skin stapler device.  The abdomen was cleansed and dried.  Dry dressings were applied.    Patient was now turned over to the urology team.  Please see their operative report for details.  In short, there is no evidence of bladder or bilateral ureteral injuries.    Signout was now performed.  Patient was awakened.  Patient was taken to the recovery area in stable condition.    Complications:  None; patient tolerated the procedure well.    Disposition: PACU - hemodynamically stable.  Condition: stable         Additional Details: N/A    Attending Attestation: I was present and scrubbed for the entire procedure.    Haim Argueta  Phone Number: [phone]

## 2024-05-14 NOTE — PROGRESS NOTES
05/14/24 0926   Discharge Planning   Living Arrangements Spouse/significant other   Support Systems Spouse/significant other   Type of Residence Private residence   Home or Post Acute Services None   Does the patient need discharge transport arranged? No     Met with patient at bedside. Admitted for bowel resection. Pt lives with spouse and was independent PTA with no HHC or DME. PCP is Dr Ramirez. Pt is able to manage her health. Was able to drive and obtain medications. Pt plans to return home with no new discharge needs. Family will provide transport.

## 2024-05-14 NOTE — PROGRESS NOTES
Spiritual Care Visit    Clinical Encounter Type  Visited With: Patient  Routine Visit: Introduction  Continue Visiting: No                                            Taxonomy  Intended Effects: Preserve dignity and respect, Helping someone feel comforted, Promote sense of peace, Gretchen affirmation  Methods: Offer spiritual/Congregational support  Interventions: Share words of hope and inspiration, [place]    Patient shared she goes to a [employer].  Patient enjoys [employer] and other [employer] preachers.  Patient is very active in her gretchen and prays often.   prayed at her request.

## 2024-05-14 NOTE — CARE PLAN
Problem: Pain  Goal: My pain/discomfort is manageable  Outcome: Progressing     Problem: Safety  Goal: Patient will be injury free during hospitalization  Outcome: Progressing     Problem: Safety  Goal: I will remain free of falls  Outcome: Progressing     Problem: Respiratory  Goal: Wean oxygen to maintain O2 saturation per order/standard this shift  Outcome: Progressing       The clinical goals for the shift include pt will ambulate with pain controlled

## 2024-05-14 NOTE — CARE PLAN
Problem: Pain  Goal: My pain/discomfort is manageable  Outcome: Progressing     Problem: Safety  Goal: Patient will be injury free during hospitalization  Outcome: Progressing  Goal: I will remain free of falls  Outcome: Progressing     Problem: Daily Care  Goal: Daily care needs are met  Outcome: Progressing     Problem: Psychosocial Needs  Goal: Demonstrates ability to cope with hospitalization/illness  Outcome: Progressing  Goal: Collaborate with me, my family, and caregiver to identify my specific goals  Outcome: Progressing     Problem: Discharge Barriers  Goal: My discharge needs are met  Outcome: Progressing     Problem: Skin  Goal: Decreased wound size/increased tissue granulation at next dressing change  Outcome: Progressing  Goal: Participates in plan/prevention/treatment measures  Outcome: Progressing  Goal: Prevent/manage excess moisture  Outcome: Progressing  Goal: Prevent/minimize sheer/friction injuries  Outcome: Progressing  Goal: Promote/optimize nutrition  Outcome: Progressing  Goal: Promote skin healing  Outcome: Progressing     Problem: Respiratory  Goal: Clear secretions with interventions this shift  Outcome: Progressing  Goal: Minimize anxiety/maximize coping throughout shift  Outcome: Progressing  Goal: Minimal/no exertional discomfort or dyspnea this shift  Outcome: Progressing  Goal: No signs of respiratory distress (eg. Use of accessory muscles. Peds grunting)  Outcome: Progressing  Goal: Patent airway maintained this shift  Outcome: Progressing  Goal: Tolerate mechanical ventilation evidenced by VS/agitation level this shift  Outcome: Progressing  Goal: Tolerate pulmonary toileting this shift  Outcome: Progressing  Goal: Verbalize decreased shortness of breath this shift  Outcome: Progressing  Goal: Wean oxygen to maintain O2 saturation per order/standard this shift  Outcome: Progressing  Goal: Increase self care and/or family involvement in next 24 hours  Outcome: Progressing   The  patient's goals for the shift include      The clinical goals for the shift include Pt will have minimal pain this shift and receive adequate rest.    Over the shift, the patient did have minimal pain and received adequate rest.

## 2024-05-14 NOTE — PROGRESS NOTES
"Vanessa Son 74 y.o. female    Subjective  Patient seen and examined this morning.  Denies nausea and vomiting.  No fever or chills. Tolerating clear liquids.  No flatus or BM as of yet.  Pain controlled with dilaudid PCA.  Godoy cathete with green tinged urine.  Up ambulating. No acute events overnight.    Objective  PHYSICAL EXAM:  Physical Exam  Vitals reviewed.   Constitutional:       General: She is awake.      Appearance: Normal appearance.   Cardiovascular:      Rate and Rhythm: Normal rate and regular rhythm.      Pulses: Normal pulses.      Heart sounds: Normal heart sounds.   Pulmonary:      Effort: Pulmonary effort is normal.      Breath sounds: Normal breath sounds and air entry.   Abdominal:      General: Abdomen is flat. There is no distension.      Palpations: Abdomen is soft.      Tenderness: There is no guarding or rebound.      Comments: Soft, non-distended, abdominal incision with staples intact, dressing with SS drainage.  DIEGO drain with SS drainage.  Appropriately TTP.    Genitourinary:     Comments: Godoy catheter with green tinged urine.   Musculoskeletal:         General: Normal range of motion.      Cervical back: Normal range of motion.   Skin:     General: Skin is warm and dry.   Neurological:      General: No focal deficit present.      Mental Status: She is alert and oriented to person, place, and time.   Psychiatric:         Behavior: Behavior is cooperative.         Vital signs in last 24 hours:  Vitals:    05/14/24 0852   BP: 116/59   Pulse: 76   Resp:    Temp:    SpO2:          Intake/Output this shift:    Intake/Output Summary (Last 24 hours) at 5/14/2024 1046  Last data filed at 5/14/2024 0900  Gross per 24 hour   Intake 3465.42 ml   Output 1315 ml   Net 2150.42 ml        Allergies:  Allergies   Allergen Reactions    Codeine Nausea/vomiting    Norgestrel-Ethinyl Estradiol Psychosis    Atorvastatin Myalgia    Enalapril Confusion    Hydroxyzine Itching and Rash     \"Made " "condition worse\"    Simvastatin Myalgia    Terazosin Confusion        Medications:  Scheduled medications  acetaminophen, 650 mg, oral, q6h  amLODIPine, 5 mg, oral, Daily  enoxaparin, 40 mg, subcutaneous, q24h  insulin regular, 0-10 Units, subcutaneous, TID with meals  metoprolol tartrate, 50 mg, oral, BID  oxygen, , inhalation, Continuous - Inhalation      Continuous medications  HYDROmorphone,   lactated Ringer's, 100 mL/hr, Last Rate: Stopped (05/13/24 1509)  lactated Ringer's, 50 mL/hr, Last Rate: 50 mL/hr (05/14/24 0735)      PRN medications  PRN medications: colchicine, dextrose, dextrose, glucagon, glucagon, naloxone, naloxone       Labs:  Results for orders placed or performed during the hospital encounter of 05/13/24 (from the past 24 hour(s))   POCT GLUCOSE   Result Value Ref Range    POCT Glucose 226 (H) 74 - 99 mg/dL   POCT GLUCOSE   Result Value Ref Range    POCT Glucose 246 (H) 74 - 99 mg/dL   POCT GLUCOSE   Result Value Ref Range    POCT Glucose 286 (H) 74 - 99 mg/dL   POCT GLUCOSE   Result Value Ref Range    POCT Glucose 291 (H) 74 - 99 mg/dL   CBC   Result Value Ref Range    WBC 8.4 4.4 - 11.3 x10*3/uL    nRBC 0.0 0.0 - 0.0 /100 WBCs    RBC 3.74 (L) 4.00 - 5.20 x10*6/uL    Hemoglobin 11.5 (L) 12.0 - 16.0 g/dL    Hematocrit 33.2 (L) 36.0 - 46.0 %    MCV 89 80 - 100 fL    MCH 30.7 26.0 - 34.0 pg    MCHC 34.6 32.0 - 36.0 g/dL    RDW 13.7 11.5 - 14.5 %    Platelets 151 150 - 450 x10*3/uL   Renal Function Panel   Result Value Ref Range    Glucose 316 (H) 74 - 99 mg/dL    Sodium 133 (L) 136 - 145 mmol/L    Potassium 3.9 3.5 - 5.3 mmol/L    Chloride 97 (L) 98 - 107 mmol/L    Bicarbonate 27 21 - 32 mmol/L    Anion Gap 13 10 - 20 mmol/L    Urea Nitrogen 39 (H) 6 - 23 mg/dL    Creatinine 1.22 (H) 0.50 - 1.05 mg/dL    eGFR 47 (L) >60 mL/min/1.73m*2    Calcium 8.4 (L) 8.6 - 10.3 mg/dL    Phosphorus 5.4 (H) 2.5 - 4.9 mg/dL    Albumin 3.2 (L) 3.4 - 5.0 g/dL   Magnesium   Result Value Ref Range    Magnesium 1.67 " 1.60 - 2.40 mg/dL   POCT GLUCOSE   Result Value Ref Range    POCT Glucose 303 (H) 74 - 99 mg/dL        Imaging:  FL fluoro images no charge    Result Date: 5/13/2024  These images are not reportable by radiology and will not be interpreted by  Radiologists.      Plan  Colovaginal fistula   POD#1:    Open low anterior resection (Colorectal)  Takedown of colovaginal fistula (Colorectal)  Mobilization of splenic flexure (Colorectal)  29 EEA colorectal anastomosis (Colorectal)  Flexible sigmoidoscopy (Colorectal)  Cystoscopy and bilateral retrograde peylogram (Urology)  Cystogram (Urology)    - surgery as above  - avss  - Diet: sips of clear liquids   - Pain control.  DC dilaudid PCA and start oral regimen   - Nausea: antiemetics PRN  - Encourage OOB and IS  - Lovenox for DVT prophylaxis  - Maintain DIEGO drain  - Remove castorena catheter  - DC IVF    #HTN  - Home norvasc and metoprolol resumed    #DM  - Accuchecks with sliding scale coverage    - PT/OT consult   - Daily labs      Plan of care discussed and patient seen with Dr. Kendall Harris, APRN-CNP    I spent 25 minutes in the professional and overall care of this patient.

## 2024-05-15 LAB
ANION GAP SERPL CALC-SCNC: 13 MMOL/L (ref 10–20)
BUN SERPL-MCNC: 51 MG/DL (ref 6–23)
CALCIUM SERPL-MCNC: 8.5 MG/DL (ref 8.6–10.3)
CHLORIDE SERPL-SCNC: 96 MMOL/L (ref 98–107)
CO2 SERPL-SCNC: 26 MMOL/L (ref 21–32)
CREAT SERPL-MCNC: 1.15 MG/DL (ref 0.5–1.05)
EGFRCR SERPLBLD CKD-EPI 2021: 50 ML/MIN/1.73M*2
ERYTHROCYTE [DISTWIDTH] IN BLOOD BY AUTOMATED COUNT: 13.8 % (ref 11.5–14.5)
GLUCOSE BLD MANUAL STRIP-MCNC: 282 MG/DL (ref 74–99)
GLUCOSE BLD MANUAL STRIP-MCNC: 296 MG/DL (ref 74–99)
GLUCOSE BLD MANUAL STRIP-MCNC: 298 MG/DL (ref 74–99)
GLUCOSE BLD MANUAL STRIP-MCNC: 310 MG/DL (ref 74–99)
GLUCOSE SERPL-MCNC: 268 MG/DL (ref 74–99)
HCT VFR BLD AUTO: 30.6 % (ref 36–46)
HGB BLD-MCNC: 10.4 G/DL (ref 12–16)
MAGNESIUM SERPL-MCNC: 1.81 MG/DL (ref 1.6–2.4)
MCH RBC QN AUTO: 30.7 PG (ref 26–34)
MCHC RBC AUTO-ENTMCNC: 34 G/DL (ref 32–36)
MCV RBC AUTO: 90 FL (ref 80–100)
NRBC BLD-RTO: 0 /100 WBCS (ref 0–0)
PHOSPHATE SERPL-MCNC: 1.9 MG/DL (ref 2.5–4.9)
PLATELET # BLD AUTO: 134 X10*3/UL (ref 150–450)
POTASSIUM SERPL-SCNC: 3.5 MMOL/L (ref 3.5–5.3)
RBC # BLD AUTO: 3.39 X10*6/UL (ref 4–5.2)
SODIUM SERPL-SCNC: 131 MMOL/L (ref 136–145)
WBC # BLD AUTO: 7 X10*3/UL (ref 4.4–11.3)

## 2024-05-15 PROCEDURE — 2500000001 HC RX 250 WO HCPCS SELF ADMINISTERED DRUGS (ALT 637 FOR MEDICARE OP): Performed by: NURSE PRACTITIONER

## 2024-05-15 PROCEDURE — 97530 THERAPEUTIC ACTIVITIES: CPT | Mod: GP

## 2024-05-15 PROCEDURE — 97165 OT EVAL LOW COMPLEX 30 MIN: CPT | Mod: GO

## 2024-05-15 PROCEDURE — 2500000004 HC RX 250 GENERAL PHARMACY W/ HCPCS (ALT 636 FOR OP/ED): Performed by: STUDENT IN AN ORGANIZED HEALTH CARE EDUCATION/TRAINING PROGRAM

## 2024-05-15 PROCEDURE — 36415 COLL VENOUS BLD VENIPUNCTURE: CPT | Performed by: STUDENT IN AN ORGANIZED HEALTH CARE EDUCATION/TRAINING PROGRAM

## 2024-05-15 PROCEDURE — 85027 COMPLETE CBC AUTOMATED: CPT | Performed by: STUDENT IN AN ORGANIZED HEALTH CARE EDUCATION/TRAINING PROGRAM

## 2024-05-15 PROCEDURE — 2500000002 HC RX 250 W HCPCS SELF ADMINISTERED DRUGS (ALT 637 FOR MEDICARE OP, ALT 636 FOR OP/ED): Performed by: NURSE PRACTITIONER

## 2024-05-15 PROCEDURE — 2500000005 HC RX 250 GENERAL PHARMACY W/O HCPCS: Performed by: STUDENT IN AN ORGANIZED HEALTH CARE EDUCATION/TRAINING PROGRAM

## 2024-05-15 PROCEDURE — 82374 ASSAY BLOOD CARBON DIOXIDE: CPT | Performed by: STUDENT IN AN ORGANIZED HEALTH CARE EDUCATION/TRAINING PROGRAM

## 2024-05-15 PROCEDURE — 82947 ASSAY GLUCOSE BLOOD QUANT: CPT

## 2024-05-15 PROCEDURE — 97161 PT EVAL LOW COMPLEX 20 MIN: CPT | Mod: GP

## 2024-05-15 PROCEDURE — 2500000001 HC RX 250 WO HCPCS SELF ADMINISTERED DRUGS (ALT 637 FOR MEDICARE OP): Performed by: STUDENT IN AN ORGANIZED HEALTH CARE EDUCATION/TRAINING PROGRAM

## 2024-05-15 PROCEDURE — 83735 ASSAY OF MAGNESIUM: CPT | Performed by: STUDENT IN AN ORGANIZED HEALTH CARE EDUCATION/TRAINING PROGRAM

## 2024-05-15 PROCEDURE — 84100 ASSAY OF PHOSPHORUS: CPT | Performed by: STUDENT IN AN ORGANIZED HEALTH CARE EDUCATION/TRAINING PROGRAM

## 2024-05-15 PROCEDURE — 1100000001 HC PRIVATE ROOM DAILY

## 2024-05-15 RX ORDER — INSULIN GLARGINE 100 [IU]/ML
80 INJECTION, SOLUTION SUBCUTANEOUS NIGHTLY
Status: DISCONTINUED | OUTPATIENT
Start: 2024-05-15 | End: 2024-05-18 | Stop reason: HOSPADM

## 2024-05-15 RX ORDER — INSULIN LISPRO 100 [IU]/ML
0-10 INJECTION, SOLUTION INTRAVENOUS; SUBCUTANEOUS
Status: DISCONTINUED | OUTPATIENT
Start: 2024-05-15 | End: 2024-05-17

## 2024-05-15 RX ADMIN — INSULIN HUMAN 6 UNITS: 100 INJECTION, SOLUTION PARENTERAL at 08:15

## 2024-05-15 RX ADMIN — METOPROLOL TARTRATE 50 MG: 50 TABLET, FILM COATED ORAL at 21:46

## 2024-05-15 RX ADMIN — INSULIN LISPRO 6 UNITS: 100 INJECTION, SOLUTION INTRAVENOUS; SUBCUTANEOUS at 16:53

## 2024-05-15 RX ADMIN — AMLODIPINE BESYLATE 5 MG: 5 TABLET ORAL at 08:15

## 2024-05-15 RX ADMIN — METOPROLOL TARTRATE 50 MG: 50 TABLET, FILM COATED ORAL at 08:15

## 2024-05-15 RX ADMIN — Medication 250 MG: at 12:08

## 2024-05-15 RX ADMIN — ACETAMINOPHEN 650 MG: 325 TABLET ORAL at 21:46

## 2024-05-15 RX ADMIN — Medication 2 L/MIN: at 08:00

## 2024-05-15 RX ADMIN — ENOXAPARIN SODIUM 40 MG: 40 INJECTION SUBCUTANEOUS at 08:15

## 2024-05-15 RX ADMIN — ACETAMINOPHEN 650 MG: 325 TABLET ORAL at 16:53

## 2024-05-15 RX ADMIN — Medication 250 MG: at 16:53

## 2024-05-15 RX ADMIN — INSULIN LISPRO 6 UNITS: 100 INJECTION, SOLUTION INTRAVENOUS; SUBCUTANEOUS at 12:08

## 2024-05-15 RX ADMIN — ACETAMINOPHEN 650 MG: 325 TABLET ORAL at 12:08

## 2024-05-15 RX ADMIN — INSULIN GLARGINE 80 UNITS: 100 INJECTION, SOLUTION SUBCUTANEOUS at 21:45

## 2024-05-15 RX ADMIN — ACETAMINOPHEN 650 MG: 325 TABLET ORAL at 04:36

## 2024-05-15 ASSESSMENT — COGNITIVE AND FUNCTIONAL STATUS - GENERAL
STANDING UP FROM CHAIR USING ARMS: A LITTLE
DRESSING REGULAR LOWER BODY CLOTHING: A LITTLE
CLIMB 3 TO 5 STEPS WITH RAILING: A LITTLE
DRESSING REGULAR UPPER BODY CLOTHING: A LITTLE
DAILY ACTIVITIY SCORE: 20
TOILETING: A LITTLE
HELP NEEDED FOR BATHING: A LITTLE
MOVING FROM LYING ON BACK TO SITTING ON SIDE OF FLAT BED WITH BEDRAILS: A LITTLE
WALKING IN HOSPITAL ROOM: A LITTLE
MOVING TO AND FROM BED TO CHAIR: A LITTLE
CLIMB 3 TO 5 STEPS WITH RAILING: A LITTLE
STANDING UP FROM CHAIR USING ARMS: A LITTLE
TURNING FROM BACK TO SIDE WHILE IN FLAT BAD: A LITTLE
TOILETING: A LITTLE
MOVING TO AND FROM BED TO CHAIR: A LITTLE
MOBILITY SCORE: 19
DRESSING REGULAR LOWER BODY CLOTHING: A LITTLE
MOBILITY SCORE: 18
TURNING FROM BACK TO SIDE WHILE IN FLAT BAD: A LITTLE
DAILY ACTIVITIY SCORE: 22
WALKING IN HOSPITAL ROOM: A LITTLE

## 2024-05-15 ASSESSMENT — PAIN SCALES - GENERAL
PAINLEVEL_OUTOF10: 2
PAINLEVEL_OUTOF10: 3
PAINLEVEL_OUTOF10: 2

## 2024-05-15 ASSESSMENT — ACTIVITIES OF DAILY LIVING (ADL): ADL_ASSISTANCE: INDEPENDENT

## 2024-05-15 ASSESSMENT — PAIN - FUNCTIONAL ASSESSMENT
PAIN_FUNCTIONAL_ASSESSMENT: 0-10

## 2024-05-15 NOTE — PROGRESS NOTES
"Occupational Therapy    Occupational Therapy    Evaluation    Patient Name: Vanessa Son \"Valerie"  MRN: [id number]  Today's Date: 5/15/2024  Time Calculation  Start Time: 1323  Stop Time: 1337  Time Calculation (min): 14 min    Assessment  IP OT Assessment  OT Assessment:  (Pt. presents with decrease in functional status with ADLs anf mobility due to recent surgery)  Prognosis: Good  Evaluation/Treatment Tolerance: Patient tolerated treatment well  End of Session Communication: Bedside nurse  End of Session Patient Position: Up in chair, Alarm on    Plan:  Treatment Interventions: ADL retraining, Functional transfer training  OT Frequency:  (2 follow ups)  OT Discharge Recommendations: Low intensity level of continued care  OT Recommended Transfer Status: Stand by assist  OT - OK to Discharge: Yes (Next level of care when cleared by medical team)    Subjective   Per EMR:  Jodie Son is a 74 y.o. female presenting with colovaginal fistula.  Last seen in office 3/19/2024.  PMH/PSH/medications/allergies reviewed and no changes.  Only new complaint is light-headedness/dizziness which she attributes to discontinuation of some of her medications.     S/p on 5/13/2024  Open low anterior resection (Colorectal)  Takedown of colovaginal fistula (Colorectal)  Mobilization of splenic flexure (Colorectal)  29 EEA colorectal anastomosis (Colorectal)  Flexible sigmoidoscopy (Colorectal)  Cystoscopy and bilateral retrograde peylogram (Urology)  Cystogram (Urology)    Current Problem:  1. Colovaginal fistula  Surgical Pathology Exam    Surgical Pathology Exam          General:  General  Reason for Referral: ADLs, discharge planning  Referred By: Dr. Argueta  Family/Caregiver Present: No  Co-Treatment: PT  Co-Treatment Reason: Safety  Prior to Session Communication: Bedside nurse  Patient Position Received: Bed, 2 rail up, Alarm on    Precautions:  Medical Precautions: Fall precautions  Post-Surgical Precautions: Abdominal surgery " precautions      Pain:  Pain Assessment  Pain Assessment: 0-10  Pain Score:  (2-3 with movement)  Pain Type: Surgical pain  Pain Location: Abdomen    Objective     Cognition:  Overall Cognitive Status: Within Functional Limits  Orientation Level: Oriented X4          Home Living:  Home Living Comments:  (Pt. lives with spouse in home with 2 entry steps with bedroom and bathroom on second floor with walk in shower, has shower chair. PLOF independent)     Prior Function:  Level of [place]: Independent with ADLs and functional transfers  ADL Assistance: Independent  Homemaking Assistance: Independent  Ambulatory Assistance: Independent        ADL:  Grooming Assistance: Stand by  Toileting Assistance with Device: Stand by  Toileting Deficit: Grab bar use    Activity Tolerance:  Endurance: Endurance does not limit participation in activity    Bed Mobility/Transfers:   Bed Mobility  Bed Mobility:  (supine to sit SUP with log roll technique)  Transfers  Transfer:  (sit<> stand SUP, toilet transfer SUP)    Ambulation/Gait Training:  Functional Mobility  Functional Mobility Performed:  (Pt. completes functional mobility with ww in halls SUP)    Sitting Balance:  Static Sitting Balance  Static Sitting-Balance Support: No upper extremity supported  Static Sitting-Level of Assistance: Independent    Standing Balance:  Static Standing Balance  Static Standing-Balance Support: Bilateral upper extremity supported  Static Standing-Level of Assistance: Close supervision      Sensation:  Sensation Comment: Denies numbness        Hand Function:  Hand Function  Gross Grasp: Functional  Coordination: Functional    Extremities: RUE   RUE : Within Functional Limits and LUE   LUE: Within Functional Limits    Outcome Measures: [facility] Daily Activity  Putting on and taking off regular lower body clothing: A little  Bathing (including washing, rinsing, drying): A little  Putting on and taking off regular upper body clothing: A  little  Toileting, which includes using toilet, bedpan or urinal: A little  Taking care of personal grooming such as brushing teeth: None  Eating Meals: None  Daily Activity - Total Score: 20          EDUCATION:  Education  Individual(s) Educated: Patient  Education Provided: Fall precautons, Risk and benefits of OT discussed with patient or other, POC discussed and agreed upon  Patient Response to Education: Patient/Caregiver Verbalized Understanding of Information      Goals:   Encounter Problems       Encounter Problems (Active)       Dressings Lower Extremities       STG - Patient to complete lower body dressing independent       Start:  05/15/24    Expected End:  05/29/24               Grooming       STG - Patient will tolerate standing 4-8 min       Start:  05/15/24    Expected End:  05/29/24               OT Transfers       STG - Patient will perform bed mobility MOD I       Start:  05/15/24    Expected End:  05/29/24            STG - Patient will perform toilet transfer MOD I       Start:  05/15/24    Expected End:  05/29/24

## 2024-05-15 NOTE — CARE PLAN
Problem: Pain  Goal: My pain/discomfort is manageable  Outcome: Progressing     Problem: Safety  Goal: Patient will be injury free during hospitalization  Outcome: Progressing  Goal: I will remain free of falls  Outcome: Progressing     Problem: Daily Care  Goal: Daily care needs are met  Outcome: Progressing     Problem: Psychosocial Needs  Goal: Demonstrates ability to cope with hospitalization/illness  Outcome: Progressing  Goal: Collaborate with me, my family, and caregiver to identify my specific goals  Outcome: Progressing     Problem: Discharge Barriers  Goal: My discharge needs are met  Outcome: Progressing     Problem: Skin  Goal: Decreased wound size/increased tissue granulation at next dressing change  Outcome: Progressing  Goal: Participates in plan/prevention/treatment measures  Outcome: Progressing  Goal: Prevent/manage excess moisture  Outcome: Progressing  Goal: Prevent/minimize sheer/friction injuries  Outcome: Progressing  Goal: Promote/optimize nutrition  Outcome: Progressing  Goal: Promote skin healing  Outcome: Progressing     Problem: Respiratory  Goal: Clear secretions with interventions this shift  Outcome: Progressing  Goal: Minimize anxiety/maximize coping throughout shift  Outcome: Progressing  Goal: Minimal/no exertional discomfort or dyspnea this shift  Outcome: Progressing  Goal: No signs of respiratory distress (eg. Use of accessory muscles. Peds grunting)  Outcome: Progressing  Goal: Patent airway maintained this shift  Outcome: Progressing  Goal: Tolerate mechanical ventilation evidenced by VS/agitation level this shift  Outcome: Progressing  Goal: Tolerate pulmonary toileting this shift  Outcome: Progressing  Goal: Verbalize decreased shortness of breath this shift  Outcome: Progressing  Goal: Wean oxygen to maintain O2 saturation per order/standard this shift  Outcome: Progressing  Goal: Increase self care and/or family involvement in next 24 hours  Outcome: Progressing   The  patient's goals for the shift include      The clinical goals for the shift include Pt will have minimal pain this shift and receive adequate rest.    Over the shift, the patient did have minimal pain this shift and received rest.

## 2024-05-15 NOTE — CARE PLAN
The patient's goals for the shift include      The clinical goals for the shift include Pt will have minimal pain this shift and receive adequate rest.      Problem: Pain  Goal: My pain/discomfort is manageable  Outcome: Progressing     Problem: Safety  Goal: Patient will be injury free during hospitalization  Outcome: Progressing  Goal: I will remain free of falls  Outcome: Progressing     Problem: Daily Care  Goal: Daily care needs are met  Outcome: Progressing     Problem: Psychosocial Needs  Goal: Demonstrates ability to cope with hospitalization/illness  Outcome: Progressing     Problem: Psychosocial Needs  Goal: Demonstrates ability to cope with hospitalization/illness  Outcome: Progressing  Goal: Collaborate with me, my family, and caregiver to identify my specific goals  Outcome: Progressing     Problem: Discharge Barriers  Goal: My discharge needs are met  Outcome: Progressing     Problem: Skin  Goal: Decreased wound size/increased tissue granulation at next dressing change  Outcome: Progressing  Goal: Participates in plan/prevention/treatment measures  Outcome: Progressing  Goal: Prevent/manage excess moisture  Outcome: Progressing  Goal: Prevent/minimize sheer/friction injuries  Outcome: Progressing  Goal: Promote/optimize nutrition  Outcome: Progressing  Goal: Promote skin healing  Outcome: Progressing     Problem: Respiratory  Goal: Clear secretions with interventions this shift  Outcome: Progressing  Goal: Minimize anxiety/maximize coping throughout shift  Outcome: Progressing  Goal: Minimal/no exertional discomfort or dyspnea this shift  Outcome: Progressing  Goal: No signs of respiratory distress (eg. Use of accessory muscles. Peds grunting)  Outcome: Progressing  Goal: Patent airway maintained this shift  Outcome: Progressing  Goal: Tolerate mechanical ventilation evidenced by VS/agitation level this shift  Outcome: Progressing  Goal: Tolerate pulmonary toileting this shift  Outcome:  Progressing  Goal: Verbalize decreased shortness of breath this shift  Outcome: Progressing  Goal: Wean oxygen to maintain O2 saturation per order/standard this shift  Outcome: Progressing  Goal: Increase self care and/or family involvement in next 24 hours  Outcome: Progressing

## 2024-05-15 NOTE — PROGRESS NOTES
"  Vanessa Son 74 y.o. female    Subjective  Patient seen and examined this morning.  Denies nausea and vomiting.  No fever or chills. Tolerating clear liquids.  No flatus or BM as of yet.  Currently sitting up in chair. Pain controlled. No acute events overnight.    Objective  PHYSICAL EXAM:  Physical Exam  Vitals reviewed.   Constitutional:       General: She is awake.      Appearance: Normal appearance.   Cardiovascular:      Rate and Rhythm: Normal rate and regular rhythm.      Pulses: Normal pulses.      Heart sounds: Normal heart sounds.   Pulmonary:      Effort: Pulmonary effort is normal.      Breath sounds: Normal breath sounds and air entry.   Abdominal:      General: Abdomen is flat. There is no distension.      Palpations: Abdomen is soft.      Tenderness: There is no guarding or rebound.      Comments: Soft, non-distended, abdominal incision with staples intact, some surrounding ecchymosis.  DIEGO drain with SS drainage.  Appropriately TTP.    Genitourinary:     Comments: Godoy catheter absent   Musculoskeletal:         General: Normal range of motion.      Cervical back: Normal range of motion.   Skin:     General: Skin is warm and dry.   Neurological:      General: No focal deficit present.      Mental Status: She is alert and oriented to person, place, and time.   Psychiatric:         Behavior: Behavior is cooperative.         Vital signs in last 24 hours:  Vitals:    05/15/24 0815   BP: 118/56   Pulse: 74   Resp: 17   Temp: 36.4 °C (97.5 °F)   SpO2: 99%         Intake/Output this shift:    Intake/Output Summary (Last 24 hours) at 5/15/2024 0959  Last data filed at 5/15/2024 0853  Gross per 24 hour   Intake 1042.4 ml   Output 585 ml   Net 457.4 ml        Allergies:  Allergies   Allergen Reactions    Codeine Nausea/vomiting    Norgestrel-Ethinyl Estradiol Psychosis    Atorvastatin Myalgia    Enalapril Confusion    Hydroxyzine Itching and Rash     \"Made condition worse\"    Simvastatin Myalgia    " Terazosin Confusion        Medications:  Scheduled medications  acetaminophen, 650 mg, oral, q6h  amLODIPine, 5 mg, oral, Daily  enoxaparin, 40 mg, subcutaneous, q24h  insulin regular, 0-10 Units, subcutaneous, TID  metoprolol tartrate, 50 mg, oral, BID  oxygen, , inhalation, Continuous - Inhalation      Continuous medications       PRN medications  PRN medications: colchicine, dextrose, dextrose, glucagon, glucagon, naloxone, naloxone, ondansetron, oxyCODONE, oxyCODONE       Labs:  Results for orders placed or performed during the hospital encounter of 05/13/24 (from the past 24 hour(s))   POCT GLUCOSE   Result Value Ref Range    POCT Glucose 300 (H) 74 - 99 mg/dL   POCT GLUCOSE   Result Value Ref Range    POCT Glucose 267 (H) 74 - 99 mg/dL   POCT GLUCOSE   Result Value Ref Range    POCT Glucose 318 (H) 74 - 99 mg/dL   CBC   Result Value Ref Range    WBC 7.0 4.4 - 11.3 x10*3/uL    nRBC 0.0 0.0 - 0.0 /100 WBCs    RBC 3.39 (L) 4.00 - 5.20 x10*6/uL    Hemoglobin 10.4 (L) 12.0 - 16.0 g/dL    Hematocrit 30.6 (L) 36.0 - 46.0 %    MCV 90 80 - 100 fL    MCH 30.7 26.0 - 34.0 pg    MCHC 34.0 32.0 - 36.0 g/dL    RDW 13.8 11.5 - 14.5 %    Platelets 134 (L) 150 - 450 x10*3/uL   Basic metabolic panel   Result Value Ref Range    Glucose 268 (H) 74 - 99 mg/dL    Sodium 131 (L) 136 - 145 mmol/L    Potassium 3.5 3.5 - 5.3 mmol/L    Chloride 96 (L) 98 - 107 mmol/L    Bicarbonate 26 21 - 32 mmol/L    Anion Gap 13 10 - 20 mmol/L    Urea Nitrogen 51 (H) 6 - 23 mg/dL    Creatinine 1.15 (H) 0.50 - 1.05 mg/dL    eGFR 50 (L) >60 mL/min/1.73m*2    Calcium 8.5 (L) 8.6 - 10.3 mg/dL   Magnesium   Result Value Ref Range    Magnesium 1.81 1.60 - 2.40 mg/dL   Phosphorus   Result Value Ref Range    Phosphorus 1.9 (L) 2.5 - 4.9 mg/dL   POCT GLUCOSE   Result Value Ref Range    POCT Glucose 282 (H) 74 - 99 mg/dL        Imaging:  FL fluoro images no charge    Result Date: 5/13/2024  These images are not reportable by radiology and will not be  interpreted by  Radiologists.      Plan  Colovaginal fistula   POD#2:    Open low anterior resection (Colorectal)  Takedown of colovaginal fistula (Colorectal)  Mobilization of splenic flexure (Colorectal)  29 EEA colorectal anastomosis (Colorectal)  Flexible sigmoidoscopy (Colorectal)  Cystoscopy and bilateral retrograde peylogram (Urology)  Cystogram (Urology)    - surgery as above  - avss  - Diet: continue sips of clear liquids   - Pain control.  Oral regimen  - Nausea: antiemetics PRN  - Encourage OOB and IS  - Lovenox for DVT prophylaxis  - Maintain DIEGO drain  - Godoy catheter removed 5/14  - DC IVF    #Hypophosphatemia  - replete    #HTN  - Home norvasc and metoprolol resumed    #DM  - Accuchecks with sliding scale coverage  - Will add home lantus at HS     - PT/OT consult   - Daily labs      Plan of care discussed and patient seen with Dr. Kendall Harris, APRN-CNP    I spent 25 minutes in the professional and overall care of this patient.

## 2024-05-15 NOTE — PROGRESS NOTES
"Physical Therapy    Physical Therapy Evaluation    Patient Name: Vanessa Son \"Valerie"  MRN: [id number]  Today's Date: 5/15/2024   Time Calculation  Start Time: 1323  Stop Time: 1337  Time Calculation (min): 14 min  4105    Assessment/Plan   PT Assessment: Pt demonstrates decreased strength and mildly impaired balance/mobility.  Pt appears below baseline level of function and based on current level of function, pt would benefit from continued skilled therapy while in the hospital to ensure safety, decrease risk of falls, and regain strength/mobility back to baseline.  Once stable enough for discharge, pt would benefit from low intensity therapy.     PT Assessment Results: Decreased strength, Decreased mobility, Pain  Rehab Prognosis: Good  Evaluation/Treatment Tolerance: Patient tolerated treatment well  Medical Staff Made Aware: Yes  End of Session Communication: Bedside nurse  End of Session Patient Position: Up in chair, Alarm on  IP OR SWING BED PT PLAN  Inpatient or Swing Bed: Inpatient  PT Plan  Treatment/Interventions: Bed mobility, Transfer training, Gait training, Stair training, Balance training, Neuromuscular re-education, Strengthening, Endurance training, Range of motion, Therapeutic exercise, Therapeutic activity  PT Plan: Skilled PT  PT Frequency: 4 times per week  PT Discharge Recommendations: Low intensity level of continued care  Equipment Recommended upon Discharge: Wheeled walker  PT Recommended Transfer Status: Contact guard  PT - OK to Discharge: Yes - To next level of care when cleared by medical team    Subjective     Current Problem:  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 (Multi)    Essential hypertension    Gout    Hyperuricemia    Hematuria    Lichen sclerosus    Primary osteoarthritis of right knee    Status post total knee replacement    Class 3 severe obesity due to excess calories with serious comorbidity and " body mass index (BMI) of 40.0 to 44.9 in adult (Multi)    Encounter for screening mammogram for breast cancer    Need for vaccination    Type 2 diabetes mellitus with hyperglycemia, with long-term current use of insulin (Multi)    Mixed hyperlipidemia    Colovaginal fistula       General Visit Information:  POD #2   Open low anterior resection (Colorectal)  Takedown of colovaginal fistula (Colorectal)  Mobilization of splenic flexure (Colorectal)  29 EEA colorectal anastomosis (Colorectal)  Flexible sigmoidoscopy (Colorectal)  Cystoscopy and bilateral retrograde peylogram (Urology)  Cystogram (Urology)    Per EMR: pt presenting with colovaginal fistula.     General: On arrival, pt supine in bed.  Pt in no apparent distress and agreeable to therapy.  Pt with DIEGO drain.    Reason for Referral: recent abdominal surgery  Referred By: Haim Argueta  Co-Treatment: OT  Prior to Session Communication: Bedside nurse  Patient Position Received: Bed, 3 rail up, Alarm on    Home Living/PLOF:  Pt lives with spouse in house with 2 KADY.  Pt has half bath on main level.  Pt has 12-13 steps up to 2nd floor with bedroom and full bath with WIS and chair.  Indep with mobility and ADLs PTA.     Precautions:  Precautions  Medical Precautions: Fall precautions  Post-Surgical Precautions: Abdominal surgery precautions     Objective     Pain:  Pain Assessment  Pain Assessment: 0-10  Pain Score:  (/10 at rest; 2-3/10 with movement)  Pain Type: Surgical pain  Pain Location: Abdomen  Pain Interventions: Ambulation/increased activity, Repositioned    Cognition:  Cognition  Overall Cognitive Status: Within Functional Limits  Orientation Level: Oriented X4    General Assessments:      Activity Tolerance  Endurance: Endurance does not limit participation in activity  Sensation  Sensation Comment: denies any numbness/tingling    Static Sitting Balance  Static Sitting-Comment/Number of Minutes: good  Dynamic Sitting Balance  Dynamic Sitting-Comments:  good  Static Standing Balance  Static Standing-Comment/Number of Minutes: good  Dynamic Standing Balance  Dynamic Standing-Comments: good    Functional Assessments:  Bed Mobility   Supine to sit: SBA using log roll technique    Transfers  Sit to stand: SUP  Stand to sit: SUP    Ambulation/Gait Training  Pt ambulated 250 ft with SUP and FWW; grossly steady, no significant gait deviations    Extremity/Trunk Assessments:  BLE strength: appears WFL; not formally tested secondary to abdominal surgery     Outcome Measures:  [facility] Basic Mobility  Turning from your back to your side while in a flat bed without using bedrails: A little  Moving from lying on your back to sitting on the side of a flat bed without using bedrails: A little  Moving to and from bed to chair (including a wheelchair): A little  Standing up from a chair using your arms (e.g. wheelchair or bedside chair): A little  To walk in hospital room: A little  Climbing 3-5 steps with railing: A little  Basic Mobility - Total Score: 18    Goals:  Encounter Problems       Encounter Problems (Active)       PT Problem       Pt will be able to perform all bed mobility tasks with Mod I.  (Progressing)       Start:  05/15/24    Expected End:  05/29/24            Pt will perform all transfers with Mod I and FWW with proper safety mechanics.   (Progressing)       Start:  05/15/24    Expected End:  05/29/24            Pt will ambulate 300 ft with Mod I using FWW for improved functional independence.  (Progressing)       Start:  05/15/24    Expected End:  05/29/24            Pt will be able to negotiate 12 steps with 1 HR with Mod I.  (Progressing)       Start:  05/15/24    Expected End:  05/29/24            Pt will be able to maintain abdominal precautions while performing functional mobility and tasks.  (Progressing)       Start:  05/15/24    Expected End:  05/29/24                 Education Documentation  Precautions, taught by Karrie Kuo, PT at 5/15/2024  4:38  PM.  Learner: Patient  Readiness: Acceptance  Method: Explanation  Response: Verbalizes Understanding    Body Mechanics, taught by Karrie Kuo, PT at 5/15/2024  4:38 PM.  Learner: Patient  Readiness: Acceptance  Method: Explanation  Response: Verbalizes Understanding    Home Exercise Program, taught by Karrie Kuo, PT at 5/15/2024  4:38 PM.  Learner: Patient  Readiness: Acceptance  Method: Explanation  Response: Verbalizes Understanding    Mobility Training, taught by Karrie Kuo, PT at 5/15/2024  4:38 PM.  Learner: Patient  Readiness: Acceptance  Method: Explanation  Response: Verbalizes Understanding    Education Comments  No comments found.

## 2024-05-16 LAB
ANION GAP SERPL CALC-SCNC: 11 MMOL/L (ref 10–20)
BUN SERPL-MCNC: 41 MG/DL (ref 6–23)
CALCIUM SERPL-MCNC: 8.5 MG/DL (ref 8.6–10.3)
CHLORIDE SERPL-SCNC: 98 MMOL/L (ref 98–107)
CO2 SERPL-SCNC: 28 MMOL/L (ref 21–32)
CREAT SERPL-MCNC: 0.84 MG/DL (ref 0.5–1.05)
EGFRCR SERPLBLD CKD-EPI 2021: 73 ML/MIN/1.73M*2
ERYTHROCYTE [DISTWIDTH] IN BLOOD BY AUTOMATED COUNT: 13.5 % (ref 11.5–14.5)
GLUCOSE BLD MANUAL STRIP-MCNC: 281 MG/DL (ref 74–99)
GLUCOSE BLD MANUAL STRIP-MCNC: 286 MG/DL (ref 74–99)
GLUCOSE BLD MANUAL STRIP-MCNC: 298 MG/DL (ref 74–99)
GLUCOSE BLD MANUAL STRIP-MCNC: 308 MG/DL (ref 74–99)
GLUCOSE SERPL-MCNC: 264 MG/DL (ref 74–99)
HCT VFR BLD AUTO: 29.5 % (ref 36–46)
HGB BLD-MCNC: 10 G/DL (ref 12–16)
MAGNESIUM SERPL-MCNC: 2.07 MG/DL (ref 1.6–2.4)
MCH RBC QN AUTO: 30.8 PG (ref 26–34)
MCHC RBC AUTO-ENTMCNC: 33.9 G/DL (ref 32–36)
MCV RBC AUTO: 91 FL (ref 80–100)
NRBC BLD-RTO: 0 /100 WBCS (ref 0–0)
PHOSPHATE SERPL-MCNC: 1.3 MG/DL (ref 2.5–4.9)
PLATELET # BLD AUTO: 111 X10*3/UL (ref 150–450)
POTASSIUM SERPL-SCNC: 3.5 MMOL/L (ref 3.5–5.3)
RBC # BLD AUTO: 3.25 X10*6/UL (ref 4–5.2)
SODIUM SERPL-SCNC: 133 MMOL/L (ref 136–145)
WBC # BLD AUTO: 5.2 X10*3/UL (ref 4.4–11.3)

## 2024-05-16 PROCEDURE — 97110 THERAPEUTIC EXERCISES: CPT | Mod: GP,CQ

## 2024-05-16 PROCEDURE — 1100000001 HC PRIVATE ROOM DAILY

## 2024-05-16 PROCEDURE — 2500000004 HC RX 250 GENERAL PHARMACY W/ HCPCS (ALT 636 FOR OP/ED): Performed by: STUDENT IN AN ORGANIZED HEALTH CARE EDUCATION/TRAINING PROGRAM

## 2024-05-16 PROCEDURE — 83735 ASSAY OF MAGNESIUM: CPT | Performed by: STUDENT IN AN ORGANIZED HEALTH CARE EDUCATION/TRAINING PROGRAM

## 2024-05-16 PROCEDURE — 2500000001 HC RX 250 WO HCPCS SELF ADMINISTERED DRUGS (ALT 637 FOR MEDICARE OP): Performed by: STUDENT IN AN ORGANIZED HEALTH CARE EDUCATION/TRAINING PROGRAM

## 2024-05-16 PROCEDURE — 84100 ASSAY OF PHOSPHORUS: CPT | Performed by: STUDENT IN AN ORGANIZED HEALTH CARE EDUCATION/TRAINING PROGRAM

## 2024-05-16 PROCEDURE — 82374 ASSAY BLOOD CARBON DIOXIDE: CPT | Performed by: STUDENT IN AN ORGANIZED HEALTH CARE EDUCATION/TRAINING PROGRAM

## 2024-05-16 PROCEDURE — 2500000002 HC RX 250 W HCPCS SELF ADMINISTERED DRUGS (ALT 637 FOR MEDICARE OP, ALT 636 FOR OP/ED): Performed by: NURSE PRACTITIONER

## 2024-05-16 PROCEDURE — 85027 COMPLETE CBC AUTOMATED: CPT | Performed by: STUDENT IN AN ORGANIZED HEALTH CARE EDUCATION/TRAINING PROGRAM

## 2024-05-16 PROCEDURE — 82947 ASSAY GLUCOSE BLOOD QUANT: CPT

## 2024-05-16 PROCEDURE — 97116 GAIT TRAINING THERAPY: CPT | Mod: GP,CQ

## 2024-05-16 PROCEDURE — 2500000001 HC RX 250 WO HCPCS SELF ADMINISTERED DRUGS (ALT 637 FOR MEDICARE OP): Performed by: NURSE PRACTITIONER

## 2024-05-16 PROCEDURE — 36415 COLL VENOUS BLD VENIPUNCTURE: CPT | Performed by: STUDENT IN AN ORGANIZED HEALTH CARE EDUCATION/TRAINING PROGRAM

## 2024-05-16 RX ADMIN — ACETAMINOPHEN 650 MG: 325 TABLET ORAL at 16:59

## 2024-05-16 RX ADMIN — INSULIN GLARGINE 80 UNITS: 100 INJECTION, SOLUTION SUBCUTANEOUS at 21:32

## 2024-05-16 RX ADMIN — INSULIN LISPRO 6 UNITS: 100 INJECTION, SOLUTION INTRAVENOUS; SUBCUTANEOUS at 17:00

## 2024-05-16 RX ADMIN — INSULIN LISPRO 6 UNITS: 100 INJECTION, SOLUTION INTRAVENOUS; SUBCUTANEOUS at 08:58

## 2024-05-16 RX ADMIN — Medication 250 MG: at 10:59

## 2024-05-16 RX ADMIN — ACETAMINOPHEN 650 MG: 325 TABLET ORAL at 23:04

## 2024-05-16 RX ADMIN — ACETAMINOPHEN 650 MG: 325 TABLET ORAL at 11:00

## 2024-05-16 RX ADMIN — ACETAMINOPHEN 650 MG: 325 TABLET ORAL at 06:00

## 2024-05-16 RX ADMIN — METOPROLOL TARTRATE 50 MG: 50 TABLET, FILM COATED ORAL at 21:33

## 2024-05-16 RX ADMIN — METOPROLOL TARTRATE 50 MG: 50 TABLET, FILM COATED ORAL at 08:57

## 2024-05-16 RX ADMIN — Medication 250 MG: at 17:00

## 2024-05-16 RX ADMIN — INSULIN LISPRO 6 UNITS: 100 INJECTION, SOLUTION INTRAVENOUS; SUBCUTANEOUS at 12:21

## 2024-05-16 RX ADMIN — AMLODIPINE BESYLATE 5 MG: 5 TABLET ORAL at 08:57

## 2024-05-16 RX ADMIN — ENOXAPARIN SODIUM 40 MG: 40 INJECTION SUBCUTANEOUS at 08:57

## 2024-05-16 ASSESSMENT — COGNITIVE AND FUNCTIONAL STATUS - GENERAL
CLIMB 3 TO 5 STEPS WITH RAILING: A LITTLE
TOILETING: A LITTLE
MOBILITY SCORE: 20
STANDING UP FROM CHAIR USING ARMS: A LITTLE
MOBILITY SCORE: 20
DRESSING REGULAR LOWER BODY CLOTHING: A LITTLE
STANDING UP FROM CHAIR USING ARMS: A LITTLE
DAILY ACTIVITIY SCORE: 22
WALKING IN HOSPITAL ROOM: A LITTLE
MOVING TO AND FROM BED TO CHAIR: A LITTLE
WALKING IN HOSPITAL ROOM: A LITTLE
CLIMB 3 TO 5 STEPS WITH RAILING: A LITTLE
MOVING TO AND FROM BED TO CHAIR: A LITTLE

## 2024-05-16 ASSESSMENT — ACTIVITIES OF DAILY LIVING (ADL): EFFECT OF PAIN ON DAILY ACTIVITIES: .

## 2024-05-16 ASSESSMENT — PAIN DESCRIPTION - DESCRIPTORS: DESCRIPTORS: TENDER

## 2024-05-16 ASSESSMENT — PAIN SCALES - GENERAL
PAINLEVEL_OUTOF10: 1
PAINLEVEL_OUTOF10: 1

## 2024-05-16 ASSESSMENT — PAIN - FUNCTIONAL ASSESSMENT
PAIN_FUNCTIONAL_ASSESSMENT: 0-10

## 2024-05-16 NOTE — CARE PLAN
Problem: Pain  Goal: My pain/discomfort is manageable  Outcome: Progressing     Problem: Safety  Goal: Patient will be injury free during hospitalization  Outcome: Progressing  Goal: I will remain free of falls  Outcome: Progressing     Problem: Daily Care  Goal: Daily care needs are met  Outcome: Progressing     Problem: Psychosocial Needs  Goal: Demonstrates ability to cope with hospitalization/illness  Outcome: Progressing  Goal: Collaborate with me, my family, and caregiver to identify my specific goals  Outcome: Progressing     Problem: Discharge Barriers  Goal: My discharge needs are met  Outcome: Progressing     Problem: Skin  Goal: Decreased wound size/increased tissue granulation at next dressing change  Outcome: Progressing  Goal: Participates in plan/prevention/treatment measures  Outcome: Progressing  Goal: Prevent/manage excess moisture  Outcome: Progressing  Goal: Prevent/minimize sheer/friction injuries  Outcome: Progressing  Goal: Promote/optimize nutrition  Outcome: Progressing  Goal: Promote skin healing  Outcome: Progressing     Problem: Respiratory  Goal: Clear secretions with interventions this shift  Outcome: Progressing  Goal: Minimize anxiety/maximize coping throughout shift  Outcome: Progressing  Goal: Minimal/no exertional discomfort or dyspnea this shift  Outcome: Progressing  Goal: No signs of respiratory distress (eg. Use of accessory muscles. Peds grunting)  Outcome: Progressing  Goal: Patent airway maintained this shift  Outcome: Progressing  Goal: Tolerate mechanical ventilation evidenced by VS/agitation level this shift  Outcome: Progressing  Goal: Tolerate pulmonary toileting this shift  Outcome: Progressing  Goal: Verbalize decreased shortness of breath this shift  Outcome: Progressing  Goal: Wean oxygen to maintain O2 saturation per order/standard this shift  Outcome: Progressing  Goal: Increase self care and/or family involvement in next 24 hours  Outcome: Progressing      Problem: Dressings Lower Extremities  Goal: STG - Patient to complete lower body dressing independent  Outcome: Progressing     Problem: Grooming  Goal: STG - Patient will tolerate standing 4-8 min  Outcome: Progressing   The patient's goals for the shift include      The clinical goals for the shift include Pt will have minimal pain this shift, ambulate with minimal assistance and receive adequate rest.    Over the shift, the patient did ambulate with minimal assistance and received adequate rest.

## 2024-05-16 NOTE — PROGRESS NOTES
"  Vanessa Son 74 y.o. female    Subjective  Patient seen and examined this morning.  Denies nausea and vomiting.  No fever or chills. Tolerating clear liquids.  Passing flatus, no BM as of yet.  Currently sitting up in chair. Pain controlled. No acute events overnight.    Objective  PHYSICAL EXAM:  Physical Exam  Vitals reviewed.   Constitutional:       General: She is awake.      Appearance: Normal appearance.   Cardiovascular:      Rate and Rhythm: Normal rate and regular rhythm.      Pulses: Normal pulses.      Heart sounds: Normal heart sounds.   Pulmonary:      Effort: Pulmonary effort is normal.      Breath sounds: Normal breath sounds and air entry.   Abdominal:      General: Abdomen is flat. There is no distension.      Palpations: Abdomen is soft.      Tenderness: There is no guarding or rebound.      Comments: Soft, non-distended, abdominal incision with staples intact, some surrounding ecchymosis.  DIEGO drain with SS drainage.  Appropriately TTP.    Genitourinary:     Comments: Godoy catheter absent   Musculoskeletal:         General: Normal range of motion.      Cervical back: Normal range of motion.   Skin:     General: Skin is warm and dry.   Neurological:      General: No focal deficit present.      Mental Status: She is alert and oriented to person, place, and time.   Psychiatric:         Behavior: Behavior is cooperative.         Vital signs in last 24 hours:  Vitals:    05/16/24 0400   BP: 136/73   Pulse: 84   Resp: 16   Temp: 36.4 °C (97.5 °F)   SpO2: 95%         Intake/Output this shift:    Intake/Output Summary (Last 24 hours) at 5/16/2024 0820  Last data filed at 5/16/2024 0612  Gross per 24 hour   Intake 240 ml   Output 270 ml   Net -30 ml        Allergies:  Allergies   Allergen Reactions    Codeine Nausea/vomiting    Norgestrel-Ethinyl Estradiol Psychosis    Atorvastatin Myalgia    Enalapril Confusion    Hydroxyzine Itching and Rash     \"Made condition worse\"    Simvastatin Myalgia    " FOLLOW UP VISIT    SUBJECTIVE: Tawnya Goltz is a 40 y.o. female [address] who presents to follow up from abdominal pain and history of uterine fibroid. Pt. Had ultrasound pelvic done that revealed one fibroid, about 3.5 cm in size. Pt. Is not having any concerns with her bleeding, nor is she desiring pregnancy at this time. Her uterus is not particularly enlarged. Pt. Is still concerned about the pain and nausea she feels above the umbilicus and will be referred to GI for further evaluation and treatment. Patient's last menstrual period was 08/01/2019 (exact date). .    ROS: Pertinent items are noted in HPI. OBJECTIVE:     Visit Vitals  /78 (BP 1 Location: Right arm, BP Patient Position: Sitting)   Pulse 79   Temp 96.5 °F (35.8 °C) (Oral)   Resp 18   Ht 5' 4\" (1.626 m)   Wt 243 lb 1 oz (110.3 kg)   LMP 08/01/2019 (Exact Date)   BMI 41.72 kg/m²       General:  alert, cooperative, no distress, appears stated age   Skin:  Normal.   Extremities:  extremities normal, atraumatic, no cyanosis or edema   Neurologic:  negative   Psychiatric:  non focal       ASSESSMENT:  Uterine fibroids- asymptomatic  Abdominal pain and nausea    Plan:  Options for GI physicians, names and addresses given. RTO 1 year for well woman exam.  Pelvic ultrasound in 2 years just to follow fibroid. Pt. Voices understanding of treatment plan.       Yeimy Tinsley, YOUNG Terazosin Confusion        Medications:  Scheduled medications  acetaminophen, 650 mg, oral, q6h  amLODIPine, 5 mg, oral, Daily  enoxaparin, 40 mg, subcutaneous, q24h  insulin glargine, 80 Units, subcutaneous, Nightly  insulin lispro, 0-10 Units, subcutaneous, TID  metoprolol tartrate, 50 mg, oral, BID  oxygen, , inhalation, Continuous - Inhalation  sod phos di, mono-K phos mono, 250 mg, oral, 4x daily      Continuous medications       PRN medications  PRN medications: colchicine, dextrose, dextrose, glucagon, naloxone, ondansetron, oxyCODONE, oxyCODONE       Labs:  Results for orders placed or performed during the hospital encounter of 05/13/24 (from the past 24 hour(s))   POCT GLUCOSE   Result Value Ref Range    POCT Glucose 296 (H) 74 - 99 mg/dL   POCT GLUCOSE   Result Value Ref Range    POCT Glucose 298 (H) 74 - 99 mg/dL   POCT GLUCOSE   Result Value Ref Range    POCT Glucose 310 (H) 74 - 99 mg/dL   CBC   Result Value Ref Range    WBC 5.2 4.4 - 11.3 x10*3/uL    nRBC 0.0 0.0 - 0.0 /100 WBCs    RBC 3.25 (L) 4.00 - 5.20 x10*6/uL    Hemoglobin 10.0 (L) 12.0 - 16.0 g/dL    Hematocrit 29.5 (L) 36.0 - 46.0 %    MCV 91 80 - 100 fL    MCH 30.8 26.0 - 34.0 pg    MCHC 33.9 32.0 - 36.0 g/dL    RDW 13.5 11.5 - 14.5 %    Platelets 111 (L) 150 - 450 x10*3/uL   Basic metabolic panel   Result Value Ref Range    Glucose 264 (H) 74 - 99 mg/dL    Sodium 133 (L) 136 - 145 mmol/L    Potassium 3.5 3.5 - 5.3 mmol/L    Chloride 98 98 - 107 mmol/L    Bicarbonate 28 21 - 32 mmol/L    Anion Gap 11 10 - 20 mmol/L    Urea Nitrogen 41 (H) 6 - 23 mg/dL    Creatinine 0.84 0.50 - 1.05 mg/dL    eGFR 73 >60 mL/min/1.73m*2    Calcium 8.5 (L) 8.6 - 10.3 mg/dL   Magnesium   Result Value Ref Range    Magnesium 2.07 1.60 - 2.40 mg/dL   Phosphorus   Result Value Ref Range    Phosphorus 1.3 (L) 2.5 - 4.9 mg/dL   POCT GLUCOSE   Result Value Ref Range    POCT Glucose 298 (H) 74 - 99 mg/dL          Imaging:  No results found.     Plan  Colovaginal fistula    POD#4:    Open low anterior resection (Colorectal)  Takedown of colovaginal fistula (Colorectal)  Mobilization of splenic flexure (Colorectal)  29 EEA colorectal anastomosis (Colorectal)  Flexible sigmoidoscopy (Colorectal)  Cystoscopy and bilateral retrograde peylogram (Urology)  Cystogram (Urology)    - surgery as above  - avss  - Diet: Advance to FLD  - Pain control.  Oral regimen  - Nausea: antiemetics PRN  - Encourage OOB and IS  - Lovenox for DVT prophylaxis  - Maintain DIEGO drain  - Godoy catheter removed 5/14  - DC IVF    #Hypophosphatemia  - replete    #HTN  - Home norvasc and metoprolol resumed    #DM  - Accuchecks with sliding scale coverage  - Will add home lantus at HS     - PT/OT consult   - Daily labs      Plan of care discussed and patient seen with Dr. Kendall Harris, APRN-CNP    I spent 25 minutes in the professional and overall care of this patient.

## 2024-05-16 NOTE — CARE PLAN
The patient's goals for the shift include      The clinical goals for the shift include pt will tolerate advanced diet without nausea    Over the shift, the patient did make progress toward the following goals. Pt sitting in chair. Ambulated unit. States pain controlled at 3/10. Passing flatus. Staples марина, small amt serosang drng. Abd pads applied.  Tolerating full liquid diet no nausea thus far. Mild edema to lower legs.

## 2024-05-16 NOTE — PROGRESS NOTES
"Physical Therapy    Physical Therapy    Physical Therapy Treatment    Patient Name: Vanessa Son \"Valerie"  MRN: [id number]  Today's Date: 5/16/2024  Time Calculation  Start Time: 0802  Stop Time: 0828  Time Calculation (min): 26 min       Assessment/Plan   PT Assessment  PT Assessment Results: Decreased strength, Decreased endurance, Impaired balance, Decreased mobility  Rehab Prognosis: Good  End of Session Communication: Bedside nurse  End of Session Patient Position: Up in chair, Alarm on  PT Plan  Inpatient/Swing Bed or Outpatient: Inpatient  PT Plan  Treatment/Interventions: Bed mobility, Transfer training, Gait training, Stair training, Balance training, Neuromuscular re-education, Strengthening, Endurance training, Range of motion, Therapeutic exercise, Therapeutic activity  PT Plan: Skilled PT  PT Frequency: 4 times per week  PT Discharge Recommendations: Low intensity level of continued care  Equipment Recommended upon Discharge: Wheeled walker  PT Recommended Transfer Status: Contact guard  PT - OK to Discharge: Yes     05/16/24 0802   PT  Visit   PT Received On 05/16/24   Response to Previous Treatment Patient with no complaints from previous session.   General   Reason for Referral recent abdominal surgery   Referred By Haim Argueta   Prior to Session Communication Bedside nurse   Patient Position Received Bed, 3 rail up   Precautions   Medical Precautions Fall precautions   Pain Assessment   Pain Assessment 0-10   Pain Score 1   Pain Type Surgical pain   Effect of Pain on Daily Activities .   Therapeutic Exercise   Therapeutic Exercise Performed Yes  (B LE therex  x 15 reps each  ap,laq,marching, hip abduction)   Therapeutic Activity   Therapeutic Activity Performed Yes   Balance/Neuromuscular Re-Education   Balance/Neuromuscular Re-Education Activity Performed Yes   Bed Mobility   Bed Mobility Yes   Bed Mobility 1   Bed Mobility 1 Supine to sitting   Level of Assistance 1 Close supervision   Bed " Mobility 2   Bed Mobility  2 Sitting to supine   Level of Assistance 2 Close supervision   Ambulation/Gait Training   Ambulation/Gait Training Performed Yes   Ambulation/Gait Training 1   Surface 1 Level tile   Device 1 Rolling walker   Gait Support Devices Gait belt   Assistance 1 Distant supervision   Comments/Distance (ft) 1 800  (Patient dmeos grossly steady reciprocating gait with good heel strike toe off pattern)   Transfers   Transfer Yes   PT Assessment   PT Assessment Results Decreased strength;Decreased endurance;Impaired balance;Decreased mobility   Rehab Prognosis Good   End of Session Communication Bedside nurse   End of Session Patient Position Up in chair;Alarm on   Outpatient Education   Individual(s) Educated Patient   Education Provided Fall Risk   PT Plan   Inpatient/Swing Bed or Outpatient Inpatient     Outcome Measures:  [facility] Basic Mobility  Turning from your back to your side while in a flat bed without using bedrails: None  Moving from lying on your back to sitting on the side of a flat bed without using bedrails: None  Moving to and from bed to chair (including a wheelchair): A little  Standing up from a chair using your arms (e.g. wheelchair or bedside chair): A little  To walk in hospital room: A little  Climbing 3-5 steps with railing: A little  Basic Mobility - Total Score: 20                             EDUCATION:  Outpatient Education  Individual(s) Educated: Patient  Education Provided: Fall Risk  Education Documentation  No documentation found.  Education Comments  No comments found.        GOALS:  Encounter Problems       Encounter Problems (Active)       PT Problem       Pt will be able to perform all bed mobility tasks with Mod I.  (Progressing)       Start:  05/15/24    Expected End:  05/29/24            Pt will perform all transfers with Mod I and FWW with proper safety mechanics.   (Progressing)       Start:  05/15/24    Expected End:  05/29/24            Pt will ambulate 300  ft with Mod I using FWW for improved functional independence.  (Progressing)       Start:  05/15/24    Expected End:  05/29/24            Pt will be able to negotiate 12 steps with 1 HR with Mod I.  (Progressing)       Start:  05/15/24    Expected End:  05/29/24            Pt will be able to maintain abdominal precautions while performing functional mobility and tasks.  (Progressing)       Start:  05/15/24    Expected End:  05/29/24

## 2024-05-16 NOTE — PROGRESS NOTES
"Nutrition Diet Education  Reason for education: CORS    Nutrition Note:  Vanessa Son \"Jodie\" is a 74 y.o. female presenting for scheduled colovaginal fistula takedown.    Past Medical History   has a past medical history of Adverse effect of anesthesia, Anxiety, Arthritis, Cataract, CKD (chronic kidney disease), Delayed emergence from general anesthesia, Depression, Diabetes mellitus (Multi), Diverticulosis, Fractures, GERD (gastroesophageal reflux disease), Gout, History of blood transfusion, History of malignant neoplasm, mammogram (06/2017), Hyperlipidemia, Hypertension, Lichen sclerosus, Lumbar disc disease, Mammogram declined, PONV (postoperative nausea and vomiting), Type 2 diabetes mellitus (Multi), and Uterine cancer (Multi).  Surgical History   has a past surgical history that includes Cholecystectomy (1974); Tonsillectomy (1991); Hysterectomy (1991); Cataract extraction (2018); Appendectomy (1963); Tubal ligation (1977); Total knee arthroplasty (Right, 10/2021); Colonoscopy (2019); and Joint replacement.  Nutrition Significant Labs:  BMP Trend:   Results from last 7 days   Lab Units 05/16/24  0739 05/15/24  0649 05/14/24  0658   GLUCOSE mg/dL 264* 268* 316*   CALCIUM mg/dL 8.5* 8.5* 8.4*   SODIUM mmol/L 133* 131* 133*   POTASSIUM mmol/L 3.5 3.5 3.9   CO2 mmol/L 28 26 27   CHLORIDE mmol/L 98 96* 97*   BUN mg/dL 41* 51* 39*   CREATININE mg/dL 0.84 1.15* 1.22*    , A1C:  Lab Results   Component Value Date    HGBA1C 6.8 (H) 04/29/2024       Current Diet: Adult diet Full Liquid    Encounter summary: Met with pt at bedside.  She notes that she typically likes a lot of fresh fruits and vegetable and often makes herself a \"green drink\" with lemon, kale, and celery as well as other vegetables.  She drinking [employer] Protein Shakes in the morning as she notes having had unintentional weight loss over the past few months however no significant weight change noted in archives over the past 6 months.    Education " Documentation:   Education Documentation  Nutrition Care Manual, taught by Prabhakar Garcia RD at 5/16/2024  1:52 PM.  Learner: Patient  Readiness: Acceptance  Method: Explanation, Handout  Response: Verbalizes Understanding  Comment: Provided and discussed Low Fiber Nutrition Therapy from [facility] as well as Cheo supplement as a part of ERAS protocol.  Pt plans to purchase Cheo for continued use at home and prefer fruit punch flavor.              Nutrition Prescription/Recommended Diet:  Individualized Nutrition Prescription Provided for : Continue full liquid diet, recommend advanced to goal of low fiber diet when appropriate    Time Spent/Follow-up Reminder:   Time Spent (min): 60 minutes  Last Date of Nutrition Visit: 05/16/24  Nutrition Follow-Up Needed?: 3-8 days  Follow up Comment: [facility] - ed completed

## 2024-05-17 LAB
ANION GAP SERPL CALC-SCNC: 8 MMOL/L (ref 10–20)
BUN SERPL-MCNC: 29 MG/DL (ref 6–23)
CALCIUM SERPL-MCNC: 8.3 MG/DL (ref 8.6–10.3)
CHLORIDE SERPL-SCNC: 98 MMOL/L (ref 98–107)
CO2 SERPL-SCNC: 33 MMOL/L (ref 21–32)
CREAT SERPL-MCNC: 0.76 MG/DL (ref 0.5–1.05)
EGFRCR SERPLBLD CKD-EPI 2021: 83 ML/MIN/1.73M*2
ERYTHROCYTE [DISTWIDTH] IN BLOOD BY AUTOMATED COUNT: 13.6 % (ref 11.5–14.5)
GLUCOSE BLD MANUAL STRIP-MCNC: 161 MG/DL (ref 74–99)
GLUCOSE BLD MANUAL STRIP-MCNC: 172 MG/DL (ref 74–99)
GLUCOSE BLD MANUAL STRIP-MCNC: 195 MG/DL (ref 74–99)
GLUCOSE BLD MANUAL STRIP-MCNC: 196 MG/DL (ref 74–99)
GLUCOSE SERPL-MCNC: 189 MG/DL (ref 74–99)
HCT VFR BLD AUTO: 28.5 % (ref 36–46)
HGB BLD-MCNC: 9.8 G/DL (ref 12–16)
MAGNESIUM SERPL-MCNC: 2.08 MG/DL (ref 1.6–2.4)
MCH RBC QN AUTO: 30.8 PG (ref 26–34)
MCHC RBC AUTO-ENTMCNC: 34.4 G/DL (ref 32–36)
MCV RBC AUTO: 90 FL (ref 80–100)
NRBC BLD-RTO: 0 /100 WBCS (ref 0–0)
PHOSPHATE SERPL-MCNC: 1.5 MG/DL (ref 2.5–4.9)
PLATELET # BLD AUTO: 128 X10*3/UL (ref 150–450)
POTASSIUM SERPL-SCNC: 3.2 MMOL/L (ref 3.5–5.3)
RBC # BLD AUTO: 3.18 X10*6/UL (ref 4–5.2)
SODIUM SERPL-SCNC: 136 MMOL/L (ref 136–145)
WBC # BLD AUTO: 4.9 X10*3/UL (ref 4.4–11.3)

## 2024-05-17 PROCEDURE — 85027 COMPLETE CBC AUTOMATED: CPT | Performed by: STUDENT IN AN ORGANIZED HEALTH CARE EDUCATION/TRAINING PROGRAM

## 2024-05-17 PROCEDURE — 2500000004 HC RX 250 GENERAL PHARMACY W/ HCPCS (ALT 636 FOR OP/ED): Performed by: STUDENT IN AN ORGANIZED HEALTH CARE EDUCATION/TRAINING PROGRAM

## 2024-05-17 PROCEDURE — 2500000001 HC RX 250 WO HCPCS SELF ADMINISTERED DRUGS (ALT 637 FOR MEDICARE OP): Performed by: NURSE PRACTITIONER

## 2024-05-17 PROCEDURE — 2500000001 HC RX 250 WO HCPCS SELF ADMINISTERED DRUGS (ALT 637 FOR MEDICARE OP): Performed by: STUDENT IN AN ORGANIZED HEALTH CARE EDUCATION/TRAINING PROGRAM

## 2024-05-17 PROCEDURE — 2500000006 HC RX 250 W HCPCS SELF ADMINISTERED DRUGS (ALT 637 FOR ALL PAYERS): Performed by: NURSE PRACTITIONER

## 2024-05-17 PROCEDURE — 80048 BASIC METABOLIC PNL TOTAL CA: CPT | Performed by: STUDENT IN AN ORGANIZED HEALTH CARE EDUCATION/TRAINING PROGRAM

## 2024-05-17 PROCEDURE — 82947 ASSAY GLUCOSE BLOOD QUANT: CPT | Mod: 91

## 2024-05-17 PROCEDURE — 2500000002 HC RX 250 W HCPCS SELF ADMINISTERED DRUGS (ALT 637 FOR MEDICARE OP, ALT 636 FOR OP/ED): Performed by: NURSE PRACTITIONER

## 2024-05-17 PROCEDURE — 84100 ASSAY OF PHOSPHORUS: CPT | Performed by: STUDENT IN AN ORGANIZED HEALTH CARE EDUCATION/TRAINING PROGRAM

## 2024-05-17 PROCEDURE — 36415 COLL VENOUS BLD VENIPUNCTURE: CPT | Performed by: STUDENT IN AN ORGANIZED HEALTH CARE EDUCATION/TRAINING PROGRAM

## 2024-05-17 PROCEDURE — 1100000001 HC PRIVATE ROOM DAILY

## 2024-05-17 PROCEDURE — 83735 ASSAY OF MAGNESIUM: CPT | Performed by: STUDENT IN AN ORGANIZED HEALTH CARE EDUCATION/TRAINING PROGRAM

## 2024-05-17 RX ORDER — POTASSIUM CHLORIDE 20 MEQ/1
20 TABLET, EXTENDED RELEASE ORAL ONCE
Status: COMPLETED | OUTPATIENT
Start: 2024-05-17 | End: 2024-05-17

## 2024-05-17 RX ORDER — ACETAMINOPHEN 325 MG/1
650 TABLET ORAL EVERY 6 HOURS PRN
Start: 2024-05-17

## 2024-05-17 RX ORDER — INSULIN LISPRO 100 [IU]/ML
0-15 INJECTION, SOLUTION INTRAVENOUS; SUBCUTANEOUS
Status: DISCONTINUED | OUTPATIENT
Start: 2024-05-17 | End: 2024-05-18 | Stop reason: HOSPADM

## 2024-05-17 RX ADMIN — ACETAMINOPHEN 650 MG: 325 TABLET ORAL at 04:59

## 2024-05-17 RX ADMIN — METOPROLOL TARTRATE 50 MG: 50 TABLET, FILM COATED ORAL at 21:22

## 2024-05-17 RX ADMIN — POTASSIUM CHLORIDE 20 MEQ: 1500 TABLET, EXTENDED RELEASE ORAL at 10:14

## 2024-05-17 RX ADMIN — INSULIN LISPRO 3 UNITS: 100 INJECTION, SOLUTION INTRAVENOUS; SUBCUTANEOUS at 18:31

## 2024-05-17 RX ADMIN — POTASSIUM PHOSPHATE, MONOBASIC 500 MG: 500 TABLET, SOLUBLE ORAL at 21:22

## 2024-05-17 RX ADMIN — ACETAMINOPHEN 650 MG: 325 TABLET ORAL at 18:31

## 2024-05-17 RX ADMIN — POTASSIUM PHOSPHATE, MONOBASIC 500 MG: 500 TABLET, SOLUBLE ORAL at 13:44

## 2024-05-17 RX ADMIN — INSULIN LISPRO 3 UNITS: 100 INJECTION, SOLUTION INTRAVENOUS; SUBCUTANEOUS at 08:31

## 2024-05-17 RX ADMIN — INSULIN LISPRO 3 UNITS: 100 INJECTION, SOLUTION INTRAVENOUS; SUBCUTANEOUS at 13:44

## 2024-05-17 RX ADMIN — INSULIN GLARGINE 80 UNITS: 100 INJECTION, SOLUTION SUBCUTANEOUS at 21:20

## 2024-05-17 RX ADMIN — ENOXAPARIN SODIUM 40 MG: 40 INJECTION SUBCUTANEOUS at 08:31

## 2024-05-17 RX ADMIN — ACETAMINOPHEN 650 MG: 325 TABLET ORAL at 23:10

## 2024-05-17 RX ADMIN — AMLODIPINE BESYLATE 5 MG: 5 TABLET ORAL at 08:31

## 2024-05-17 RX ADMIN — POTASSIUM PHOSPHATE, MONOBASIC 500 MG: 500 TABLET, SOLUBLE ORAL at 18:32

## 2024-05-17 RX ADMIN — METOPROLOL TARTRATE 50 MG: 50 TABLET, FILM COATED ORAL at 08:31

## 2024-05-17 RX ADMIN — ACETAMINOPHEN 650 MG: 325 TABLET ORAL at 10:14

## 2024-05-17 ASSESSMENT — COGNITIVE AND FUNCTIONAL STATUS - GENERAL
WALKING IN HOSPITAL ROOM: A LITTLE
STANDING UP FROM CHAIR USING ARMS: A LITTLE
MOBILITY SCORE: 20
MOVING TO AND FROM BED TO CHAIR: A LITTLE
CLIMB 3 TO 5 STEPS WITH RAILING: A LITTLE

## 2024-05-17 ASSESSMENT — PAIN DESCRIPTION - LOCATION
LOCATION: ABDOMEN
LOCATION: ABDOMEN

## 2024-05-17 ASSESSMENT — PAIN - FUNCTIONAL ASSESSMENT
PAIN_FUNCTIONAL_ASSESSMENT: 0-10

## 2024-05-17 ASSESSMENT — PAIN SCALES - GENERAL
PAINLEVEL_OUTOF10: 1
PAINLEVEL_OUTOF10: 0 - NO PAIN
PAINLEVEL_OUTOF10: 1
PAINLEVEL_OUTOF10: 0 - NO PAIN

## 2024-05-17 ASSESSMENT — PAIN DESCRIPTION - DESCRIPTORS: DESCRIPTORS: TENDER

## 2024-05-17 NOTE — PROGRESS NOTES
05/17/24 1512   Discharge Planning   Home or Post Acute Services None   Patient expects to be discharged to: Home   Does the patient need discharge transport arranged? No     Bowel function improving. Advancing diet. ADOD 5/18 home no needs.

## 2024-05-17 NOTE — PROGRESS NOTES
"  Vanessa Son 74 y.o. female    Subjective  Patient seen and examined this morning.  Denies nausea and vomiting.  No fever or chills. Tolerating full liquids.  Passing flatus and small BM.  Currently sitting up in chair. Pain controlled. No acute events overnight.    Objective  PHYSICAL EXAM:  Physical Exam  Vitals reviewed.   Constitutional:       General: She is awake.      Appearance: Normal appearance.   Cardiovascular:      Rate and Rhythm: Normal rate and regular rhythm.      Pulses: Normal pulses.      Heart sounds: Normal heart sounds.   Pulmonary:      Effort: Pulmonary effort is normal.      Breath sounds: Normal breath sounds and air entry.   Abdominal:      General: Abdomen is flat. There is no distension.      Palpations: Abdomen is soft.      Tenderness: There is no guarding or rebound.      Comments: Soft, non-distended, abdominal incision with staples intact, some surrounding ecchymosis.  DIEGO drain with SS drainage.  Appropriately TTP.    Genitourinary:     Comments: Godoy catheter absent   Musculoskeletal:         General: Normal range of motion.      Cervical back: Normal range of motion.   Skin:     General: Skin is warm and dry.   Neurological:      General: No focal deficit present.      Mental Status: She is alert and oriented to person, place, and time.   Psychiatric:         Behavior: Behavior is cooperative.         Vital signs in last 24 hours:  Vitals:    05/17/24 0400   BP: 123/72   Pulse: 63   Resp: 16   Temp: 36.1 °C (97 °F)   SpO2: 100%         Intake/Output this shift:    Intake/Output Summary (Last 24 hours) at 5/17/2024 0850  Last data filed at 5/17/2024 0400  Gross per 24 hour   Intake 740 ml   Output 285 ml   Net 455 ml        Allergies:  Allergies   Allergen Reactions    Codeine Nausea/vomiting    Norgestrel-Ethinyl Estradiol Psychosis    Atorvastatin Myalgia    Enalapril Confusion    Hydroxyzine Itching and Rash     \"Made condition worse\"    Simvastatin Myalgia    Terazosin " Confusion        Medications:  Scheduled medications  acetaminophen, 650 mg, oral, q6h  amLODIPine, 5 mg, oral, Daily  enoxaparin, 40 mg, subcutaneous, q24h  insulin glargine, 80 Units, subcutaneous, Nightly  insulin lispro, 0-15 Units, subcutaneous, TID  metoprolol tartrate, 50 mg, oral, BID  oxygen, , inhalation, Continuous - Inhalation      Continuous medications       PRN medications  PRN medications: colchicine, dextrose, dextrose, glucagon, naloxone, ondansetron, oxyCODONE, oxyCODONE       Labs:  Results for orders placed or performed during the hospital encounter of 05/13/24 (from the past 24 hour(s))   POCT GLUCOSE   Result Value Ref Range    POCT Glucose 286 (H) 74 - 99 mg/dL   POCT GLUCOSE   Result Value Ref Range    POCT Glucose 281 (H) 74 - 99 mg/dL   POCT GLUCOSE   Result Value Ref Range    POCT Glucose 308 (H) 74 - 99 mg/dL   CBC   Result Value Ref Range    WBC 4.9 4.4 - 11.3 x10*3/uL    nRBC 0.0 0.0 - 0.0 /100 WBCs    RBC 3.18 (L) 4.00 - 5.20 x10*6/uL    Hemoglobin 9.8 (L) 12.0 - 16.0 g/dL    Hematocrit 28.5 (L) 36.0 - 46.0 %    MCV 90 80 - 100 fL    MCH 30.8 26.0 - 34.0 pg    MCHC 34.4 32.0 - 36.0 g/dL    RDW 13.6 11.5 - 14.5 %    Platelets 128 (L) 150 - 450 x10*3/uL   Basic Metabolic Panel   Result Value Ref Range    Glucose 189 (H) 74 - 99 mg/dL    Sodium 136 136 - 145 mmol/L    Potassium 3.2 (L) 3.5 - 5.3 mmol/L    Chloride 98 98 - 107 mmol/L    Bicarbonate 33 (H) 21 - 32 mmol/L    Anion Gap 8 (L) 10 - 20 mmol/L    Urea Nitrogen 29 (H) 6 - 23 mg/dL    Creatinine 0.76 0.50 - 1.05 mg/dL    eGFR 83 >60 mL/min/1.73m*2    Calcium 8.3 (L) 8.6 - 10.3 mg/dL   Magnesium   Result Value Ref Range    Magnesium 2.08 1.60 - 2.40 mg/dL   Phosphorus   Result Value Ref Range    Phosphorus 1.5 (L) 2.5 - 4.9 mg/dL   POCT GLUCOSE   Result Value Ref Range    POCT Glucose 195 (H) 74 - 99 mg/dL          Imaging:  No results found.     Plan  Colovaginal fistula   POD#5:    Open low anterior resection  (Colorectal)  Takedown of colovaginal fistula (Colorectal)  Mobilization of splenic flexure (Colorectal)  29 EEA colorectal anastomosis (Colorectal)  Flexible sigmoidoscopy (Colorectal)  Cystoscopy and bilateral retrograde peylogram (Urology)  Cystogram (Urology)    - surgery as above  - avss  - Diet: Advance to diabetic/low fiber  - Pain control.  Oral regimen  - Nausea: antiemetics PRN  - Encourage OOB and IS  - Lovenox for DVT prophylaxis  - Remove DIEGO drain 5/17  - Godoy catheter removed 5/14  - DC IVF    #Hypophosphatemia  - replete    #Hypokalemia  - replete     #HTN  - Home norvasc and metoprolol resumed    #DM  - Accuchecks with sliding scale coverage  - Will add home lantus at HS     - PT/OT consult   - Daily labs      Plan of care discussed and patient seen with Dr. Kendall Harris, APRN-CNP    I spent 25 minutes in the professional and overall care of this patient.

## 2024-05-17 NOTE — DISCHARGE INSTRUCTIONS
WOUND CARE:  OK to shower.  Remove any dressings prior to showering.  Do not scrub your incision(s).  Pat down abdomen dry following shower and cover incisions with clean, dry gauze dressings if there is drainage.  No tub bath/soaking/swimming until cleared at outpatient appointment.  Do not remove any staples or stitches; if these are present they will be removed at outpatient appointment.  Any glue will peel away on its own.     If you have been discharged to home with an operative drain (ie bulb), empty drain daily as needed.  Please record daily output volumes and characteristics in a log.  Please bring this log with you to your follow-up appointment.     PAIN CONTROL:  Can utilize prescribed percocet as needed.  Do not combine percocet with tylenol to avoid exceeding safe daily recommended intake of acetaminophen.  Can utilize ibuprofen.     ACTIVITY:  No heavy lifting (>10-15lbs) or strenuous activity for 6 weeks.  OK to walk and take stairs at slow pace.  Do not drive or operative heavy machinery for at least first 24 hours after surgery; if you do not feel able to safely operate after first 24 hours or are taking narcotics (ie percocet or vicodin) then do not operative heavy machinery or drive.     DIET:  Adhere to soft low-fiber diet until seen in follow-up.     FOLLOW-UP:  Please call office at [phone] to set up a follow-up appointment for approximately 2 weeks from the date of surgery.

## 2024-05-17 NOTE — CARE PLAN
The patient's goals for the shift include      The clinical goals for the shift include pt will tolerate full liquid diet with no sign and symptoms of nausea    Over the shift, the patient did make progress towards her goals has been up ambulating in hallway,  practicing splinting abd, doing exercises, ,I.s pt taking sip of clears without any complaint of nausea or pain pt has remained safe this shift, making needs known using ncl as needed.

## 2024-05-18 VITALS
DIASTOLIC BLOOD PRESSURE: 81 MMHG | SYSTOLIC BLOOD PRESSURE: 147 MMHG | HEART RATE: 65 BPM | RESPIRATION RATE: 16 BRPM | BODY MASS INDEX: 37.43 KG/M2 | TEMPERATURE: 96.8 F | HEIGHT: 65 IN | WEIGHT: 224.65 LBS | OXYGEN SATURATION: 100 %

## 2024-05-18 LAB
ALBUMIN SERPL BCP-MCNC: 3 G/DL (ref 3.4–5)
ALP SERPL-CCNC: 57 U/L (ref 33–136)
ALT SERPL W P-5'-P-CCNC: 17 U/L (ref 7–45)
ANION GAP SERPL CALC-SCNC: 9 MMOL/L (ref 10–20)
AST SERPL W P-5'-P-CCNC: 18 U/L (ref 9–39)
BILIRUB SERPL-MCNC: 0.7 MG/DL (ref 0–1.2)
BUN SERPL-MCNC: 22 MG/DL (ref 6–23)
CALCIUM SERPL-MCNC: 8 MG/DL (ref 8.6–10.3)
CHLORIDE SERPL-SCNC: 100 MMOL/L (ref 98–107)
CO2 SERPL-SCNC: 32 MMOL/L (ref 21–32)
CREAT SERPL-MCNC: 0.61 MG/DL (ref 0.5–1.05)
EGFRCR SERPLBLD CKD-EPI 2021: >90 ML/MIN/1.73M*2
ERYTHROCYTE [DISTWIDTH] IN BLOOD BY AUTOMATED COUNT: 13.6 % (ref 11.5–14.5)
GLUCOSE BLD MANUAL STRIP-MCNC: 104 MG/DL (ref 74–99)
GLUCOSE SERPL-MCNC: 95 MG/DL (ref 74–99)
HCT VFR BLD AUTO: 29 % (ref 36–46)
HGB BLD-MCNC: 9.7 G/DL (ref 12–16)
MAGNESIUM SERPL-MCNC: 2.06 MG/DL (ref 1.6–2.4)
MCH RBC QN AUTO: 30.3 PG (ref 26–34)
MCHC RBC AUTO-ENTMCNC: 33.4 G/DL (ref 32–36)
MCV RBC AUTO: 91 FL (ref 80–100)
NRBC BLD-RTO: 0 /100 WBCS (ref 0–0)
PHOSPHATE SERPL-MCNC: 2.1 MG/DL (ref 2.5–4.9)
PLATELET # BLD AUTO: 146 X10*3/UL (ref 150–450)
POTASSIUM SERPL-SCNC: 3.1 MMOL/L (ref 3.5–5.3)
PROT SERPL-MCNC: 5.3 G/DL (ref 6.4–8.2)
RBC # BLD AUTO: 3.2 X10*6/UL (ref 4–5.2)
SODIUM SERPL-SCNC: 138 MMOL/L (ref 136–145)
WBC # BLD AUTO: 5.1 X10*3/UL (ref 4.4–11.3)

## 2024-05-18 PROCEDURE — 99024 POSTOP FOLLOW-UP VISIT: CPT | Performed by: STUDENT IN AN ORGANIZED HEALTH CARE EDUCATION/TRAINING PROGRAM

## 2024-05-18 PROCEDURE — 82947 ASSAY GLUCOSE BLOOD QUANT: CPT

## 2024-05-18 PROCEDURE — 2500000001 HC RX 250 WO HCPCS SELF ADMINISTERED DRUGS (ALT 637 FOR MEDICARE OP): Performed by: STUDENT IN AN ORGANIZED HEALTH CARE EDUCATION/TRAINING PROGRAM

## 2024-05-18 PROCEDURE — 2500000005 HC RX 250 GENERAL PHARMACY W/O HCPCS: Performed by: STUDENT IN AN ORGANIZED HEALTH CARE EDUCATION/TRAINING PROGRAM

## 2024-05-18 PROCEDURE — 84075 ASSAY ALKALINE PHOSPHATASE: CPT | Performed by: NURSE PRACTITIONER

## 2024-05-18 PROCEDURE — 2500000001 HC RX 250 WO HCPCS SELF ADMINISTERED DRUGS (ALT 637 FOR MEDICARE OP): Performed by: NURSE PRACTITIONER

## 2024-05-18 PROCEDURE — 85027 COMPLETE CBC AUTOMATED: CPT | Performed by: NURSE PRACTITIONER

## 2024-05-18 PROCEDURE — 36415 COLL VENOUS BLD VENIPUNCTURE: CPT | Performed by: NURSE PRACTITIONER

## 2024-05-18 PROCEDURE — 84100 ASSAY OF PHOSPHORUS: CPT | Performed by: NURSE PRACTITIONER

## 2024-05-18 PROCEDURE — 83735 ASSAY OF MAGNESIUM: CPT | Performed by: NURSE PRACTITIONER

## 2024-05-18 PROCEDURE — 2500000004 HC RX 250 GENERAL PHARMACY W/ HCPCS (ALT 636 FOR OP/ED): Performed by: STUDENT IN AN ORGANIZED HEALTH CARE EDUCATION/TRAINING PROGRAM

## 2024-05-18 RX ADMIN — ENOXAPARIN SODIUM 40 MG: 40 INJECTION SUBCUTANEOUS at 08:38

## 2024-05-18 RX ADMIN — POTASSIUM PHOSPHATE, MONOBASIC 500 MG: 500 TABLET, SOLUBLE ORAL at 06:20

## 2024-05-18 RX ADMIN — AMLODIPINE BESYLATE 5 MG: 5 TABLET ORAL at 08:38

## 2024-05-18 RX ADMIN — METOPROLOL TARTRATE 50 MG: 50 TABLET, FILM COATED ORAL at 08:38

## 2024-05-18 RX ADMIN — ACETAMINOPHEN 650 MG: 325 TABLET ORAL at 05:04

## 2024-05-18 ASSESSMENT — PAIN DESCRIPTION - ORIENTATION: ORIENTATION: MID

## 2024-05-18 ASSESSMENT — PAIN - FUNCTIONAL ASSESSMENT
PAIN_FUNCTIONAL_ASSESSMENT: 0-10
PAIN_FUNCTIONAL_ASSESSMENT: 0-10

## 2024-05-18 ASSESSMENT — PAIN SCALES - GENERAL
PAINLEVEL_OUTOF10: 1
PAINLEVEL_OUTOF10: 1

## 2024-05-18 ASSESSMENT — PAIN DESCRIPTION - LOCATION: LOCATION: ABDOMEN

## 2024-05-18 NOTE — CARE PLAN
Problem: Pain  Goal: My pain/discomfort is manageable  Outcome: Progressing     Problem: Safety  Goal: Patient will be injury free during hospitalization  Outcome: Progressing  Goal: I will remain free of falls  Outcome: Progressing     Problem: Daily Care  Goal: Daily care needs are met  Outcome: Progressing     Problem: Psychosocial Needs  Goal: Demonstrates ability to cope with hospitalization/illness  Outcome: Progressing  Goal: Collaborate with me, my family, and caregiver to identify my specific goals  Outcome: Progressing   The patient's goals for the shift include      The clinical goals for the shift include patient will ambulate and tolerate diet    Problem: Discharge Barriers  Goal: My discharge needs are met  Outcome: Progressing     Problem: Skin  Goal: Decreased wound size/increased tissue granulation at next dressing change  Outcome: Progressing  Goal: Participates in plan/prevention/treatment measures  Outcome: Progressing  Goal: Prevent/manage excess moisture  Outcome: Progressing  Goal: Prevent/minimize sheer/friction injuries  Outcome: Progressing  Goal: Promote/optimize nutrition  Outcome: Progressing  Goal: Promote skin healing  Outcome: Progressing     Problem: Respiratory  Goal: Clear secretions with interventions this shift  Outcome: Progressing  Goal: Minimize anxiety/maximize coping throughout shift  Outcome: Progressing  Goal: Minimal/no exertional discomfort or dyspnea this shift  Outcome: Progressing  Goal: No signs of respiratory distress (eg. Use of accessory muscles. Peds grunting)  Outcome: Progressing  Goal: Patent airway maintained this shift  Outcome: Progressing  Goal: Tolerate mechanical ventilation evidenced by VS/agitation level this shift  Outcome: Progressing  Goal: Tolerate pulmonary toileting this shift  Outcome: Progressing  Goal: Verbalize decreased shortness of breath this shift  Outcome: Progressing  Goal: Wean oxygen to maintain O2 saturation per order/standard this  shift  Outcome: Progressing  Goal: Increase self care and/or family involvement in next 24 hours  Outcome: Progressing     Problem: Dressings Lower Extremities  Goal: STG - Patient to complete lower body dressing independent  Outcome: Progressing     Problem: Grooming  Goal: STG - Patient will tolerate standing 4-8 min  Outcome: Progressing     Problem: Pain - Adult  Goal: Verbalizes/displays adequate comfort level or baseline comfort level  Outcome: Progressing     Problem: Safety - Adult  Goal: Free from fall injury  Outcome: Progressing     Problem: Discharge Planning  Goal: Discharge to home or other facility with appropriate resources  Outcome: Progressing     Problem: Chronic Conditions and Co-morbidities  Goal: Patient's chronic conditions and co-morbidity symptoms are monitored and maintained or improved  Outcome: Progressing     Problem: Diabetes  Goal: Achieve decreasing blood glucose levels by end of shift  Outcome: Progressing  Goal: Increase stability of blood glucose readings by end of shift  Outcome: Progressing  Goal: Decrease in ketones present in urine by end of shift  Outcome: Progressing  Goal: Maintain electrolyte levels within acceptable range throughout shift  Outcome: Progressing  Goal: Maintain glucose levels >70mg/dl to <250mg/dl throughout shift  Outcome: Progressing  Goal: No changes in neurological exam by end of shift  Outcome: Progressing  Goal: Learn about and adhere to nutrition recommendations by end of shift  Outcome: Progressing  Goal: Vital signs within normal range for age by end of shift  Outcome: Progressing  Goal: Increase self care and/or family involovement by end of shift  Outcome: Progressing  Goal: Receive DSME education by end of shift  Outcome: Progressing

## 2024-05-18 NOTE — PROGRESS NOTES
"  Vanessa Son 74 y.o. female    Subjective  Afebrile O/N.  No acute events O/N.  Pain controlled.  Tolerating low fiber diet.  Denies nausea or emesis.  Passing flatus.  No BM yesterday.  Denies dysuria.  Ambulating.    Objective  PHYSICAL EXAM:  Physical Exam  Vitals reviewed.   Constitutional:       General: She is awake.      Appearance: Normal appearance.   Pulmonary:      Effort: Pulmonary effort is normal.      Breath sounds: Normal air entry.   Abdominal:      General: Abdomen is flat. There is no distension.      Palpations: Abdomen is soft.      Tenderness: There is no guarding or rebound.      Comments: Soft, non-distended, abdominal incision with staples C/D/I, drain absent, appropriately TTP.   Genitourinary:     Comments: Godoy catheter absent   Musculoskeletal:         General: Normal range of motion.      Cervical back: Normal range of motion.   Skin:     General: Skin is warm and dry.   Neurological:      General: No focal deficit present.      Mental Status: She is alert and oriented to person, place, and time.   Psychiatric:         Behavior: Behavior is cooperative.         Vital signs in last 24 hours:  Vitals:    05/18/24 0400   BP: 146/74   Pulse: 65   Resp: 16   Temp: 36 °C (96.8 °F)   SpO2: 100%         Intake/Output this shift:    Intake/Output Summary (Last 24 hours) at 5/18/2024 0731  Last data filed at 5/18/2024 0500  Gross per 24 hour   Intake 420 ml   Output 70 ml   Net 350 ml        Allergies:  Allergies   Allergen Reactions    Codeine Nausea/vomiting    Norgestrel-Ethinyl Estradiol Psychosis    Atorvastatin Myalgia    Enalapril Confusion    Hydroxyzine Itching and Rash     \"Made condition worse\"    Simvastatin Myalgia    Terazosin Confusion        Medications:  Scheduled medications  acetaminophen, 650 mg, oral, q6h  amLODIPine, 5 mg, oral, Daily  enoxaparin, 40 mg, subcutaneous, q24h  insulin glargine, 80 Units, subcutaneous, Nightly  insulin lispro, 0-15 Units, subcutaneous, " TID  metoprolol tartrate, 50 mg, oral, BID  oxygen, , inhalation, Continuous - Inhalation      Continuous medications       PRN medications  PRN medications: colchicine, dextrose, dextrose, glucagon, naloxone, ondansetron, oxyCODONE, oxyCODONE       Labs:  Results for orders placed or performed during the hospital encounter of 05/13/24 (from the past 24 hour(s))   POCT GLUCOSE   Result Value Ref Range    POCT Glucose 195 (H) 74 - 99 mg/dL   POCT GLUCOSE   Result Value Ref Range    POCT Glucose 161 (H) 74 - 99 mg/dL   POCT GLUCOSE   Result Value Ref Range    POCT Glucose 172 (H) 74 - 99 mg/dL   POCT GLUCOSE   Result Value Ref Range    POCT Glucose 196 (H) 74 - 99 mg/dL   CBC   Result Value Ref Range    WBC 5.1 4.4 - 11.3 x10*3/uL    nRBC 0.0 0.0 - 0.0 /100 WBCs    RBC 3.20 (L) 4.00 - 5.20 x10*6/uL    Hemoglobin 9.7 (L) 12.0 - 16.0 g/dL    Hematocrit 29.0 (L) 36.0 - 46.0 %    MCV 91 80 - 100 fL    MCH 30.3 26.0 - 34.0 pg    MCHC 33.4 32.0 - 36.0 g/dL    RDW 13.6 11.5 - 14.5 %    Platelets 146 (L) 150 - 450 x10*3/uL          Imaging:  No results found.     Plan  Colovaginal fistula   POD#5:    Open low anterior resection (Colorectal)  Takedown of colovaginal fistula (Colorectal)  Mobilization of splenic flexure (Colorectal)  29 EEA colorectal anastomosis (Colorectal)  Flexible sigmoidoscopy (Colorectal)  Cystoscopy and bilateral retrograde peylogram (Urology)  Cystogram (Urology)    - surgery as above  - avss  - Diet: Diabetic/low fiber  - Pain control.  Oral regimen  - Nausea: antiemetics PRN  - Encourage OOB and IS  - Lovenox for DVT prophylaxis  - Remove DIEGO drain 5/17  - Godoy catheter removed 5/14  - DC IVF    #Hypophosphatemia  - Today's labs pending    #Hypokalemia  - Today's labs pending    #HTN  - Home norvasc and metoprolol resumed    #DM  - Accuchecks with sliding scale coverage  - Will add home lantus at HS     - DC to home today    Haim Argueta MD    I spent 15 minutes in the professional and overall care  of this patient.

## 2024-05-18 NOTE — CARE PLAN
The patient's goals for the shift include      The clinical goals for the shift include pt will continue to tolerated diet aeb no nausea or emesisi    Over the shift, the patient did make progress towards her goals she has   Been able to ambulate in hallway several time this shift, tolerating diet denies nausea no emesis this shift , pt demostrating splinting abd and exercises, pt has slept in short intervals, has remained safe this shift

## 2024-05-20 NOTE — DISCHARGE SUMMARY
Discharge Diagnosis  Colovaginal fistula  Obesity BMI 37.38  Hypertension  Diabetes  Hypophosphatemia  Hypokalemia    Issues Requiring Follow-Up  Post-operative follow-up    Test Results Pending At Discharge  Pending Labs       Order Current Status    Surgical Pathology Exam In process            Hospital Course  Patient taken to operating room on day of admission, 5/13/2024, for open low anterior resection, takedown of colovaginal fistula, mobilization of splenic flexure, 29 EEA colorectal anastomosis, flexible sigmoidoscopy, cystoscopy, bilateral retrograde pyelogram, cystogram.  Patient tolerated surgery well and post-operatively was taken to the regular nursing.  Patient was initiated on ERAS protocol.  Home norvasc and metoprolol resumed for history of hypertension.  Patient initiated on sliding scale insulin for history of diabetes.  On post-operative day one, patient was given sips of clear liquids.  Dilaudid PCA was discontinued and transitioned to oral regimen.  Godoy catheter was removed.  Intravenous fluids were discontinued.  On post-operative day two, patient was noted to have hypophosphatemia and this was repleted.  She was reinitiated on home lantus.  On post-operative day three, patient was advanced to a full liquid diet.  Operative drain was removed.  On post-operative day four, patient was advanced to a diabetic low-fiber diet.  Hypophosphatemia and hypokalemia were corrected.  On post-operative day five, patient was deemed appropriate for discharge and thus discharged to home with instructions for wound care, pain control, diet, activity, and follow-up.        Pertinent Physical Exam At Time of Discharge  Physical Exam  See progress note from day of discharge.    Home Medications     Medication List      START taking these medications     acetaminophen 325 mg tablet; Commonly known as: Tylenol; Take 2 tablets   (650 mg) by mouth every 6 hours if needed for mild pain (1 - 3).     CHANGE how you  take these medications     indapamide 1.25 mg tablet; Commonly known as: Lozol; Take 1 tablet (1.25   mg) by mouth once daily.; What changed: when to take this   Lantus Solostar U-100 Insulin 100 unit/mL (3 mL) pen; Generic drug:   insulin glargine; INJECT SUBCUTANEOUSLY 100 UNITS  DAILY; What changed:   See the new instructions.     CONTINUE taking these medications     amLODIPine 5 mg tablet; Commonly known as: Norvasc; Take 1 tablet (5 mg)   by mouth once daily.   aspirin 81 mg EC tablet   cholecalciferol 5,000 Units tablet; Commonly known as: Vitamin D-3   colchicine 0.6 mg tablet; TAKE 1 TABLET DAILY AS NEEDED  FOR GOUT   hydrocortisone 1 % cream; Apply topically 2 times a day.   insulin lispro 100 unit/mL injection; Commonly known as: HumaLOG KwikPen   Insulin; Inject 12 Units under the skin 3 times a day with meals.   KELP ORAL   losartan-hydrochlorothiazide 50-12.5 mg tablet; Commonly known as:   Hyzaar; Take 0.5 tablets by mouth once daily in the evening.   magnesium 250 mg tablet   metoprolol tartrate 100 mg tablet; Commonly known as: Lopressor; Take 1   tablet (100 mg) by mouth 2 times a day.   omega 3-dha-epa-fish oil 450 mg (128 mg- 322 mg)-650 mg capsule,delayed   release(DR/EC)   OneTouch Ultra Test strip; Generic drug: blood sugar diagnostic; TEST 3   TIMES DAILY   potassium gluconate 595 mg (99 mg) tablet   Nitro's wort 300 mg capsule   Victoza 3-Abiel 0.6 mg/0.1 mL (18 mg/3 mL) injection; Generic drug:   liraglutide; INJECT SUBCUTANEOUSLY 1.8 MG  DAILY     STOP taking these medications     chlorhexidine 0.12 % solution; Commonly known as: Peridex       Outpatient Follow-Up  Future Appointments   Date Time Provider Department Center   7/12/2024 11:00 AM Lalitha Ramirez MD [id number] [place]       Haim Argueta MD

## 2024-05-28 LAB
LABORATORY COMMENT REPORT: NORMAL
PATH REPORT.ADDENDUM SPEC: NORMAL
PATH REPORT.FINAL DX SPEC: NORMAL
PATH REPORT.GROSS SPEC: NORMAL
PATH REPORT.RELEVANT HX SPEC: NORMAL
PATH REPORT.TOTAL CANCER: NORMAL
RESIDENT REVIEW: NORMAL

## 2024-05-29 ASSESSMENT — ENCOUNTER SYMPTOMS
WHEEZING: 0
FREQUENCY: 0
FATIGUE: 0
ACTIVITY CHANGE: 0
HEMATURIA: 0
NAUSEA: 0
DIZZINESS: 0
COUGH: 0
ABDOMINAL PAIN: 0
RECTAL PAIN: 0
AGITATION: 0
DIFFICULTY URINATING: 0
ANAL BLEEDING: 0
PALPITATIONS: 0
LIGHT-HEADEDNESS: 0
APPETITE CHANGE: 0
SHORTNESS OF BREATH: 0
BLOOD IN STOOL: 0
DYSURIA: 0
ABDOMINAL DISTENTION: 0
DIARRHEA: 0
CHOKING: 0
CHEST TIGHTNESS: 0
CHILLS: 0
UNEXPECTED WEIGHT CHANGE: 0
FEVER: 0
VOMITING: 0
CONSTIPATION: 0
WEAKNESS: 0

## 2024-05-29 NOTE — PROGRESS NOTES
"HPI    Subjective   Patient ID: Vanessa Son \"Valerie" is a 74 y.o. female who underwent an EUA flex/sig on 2/23/24 for a colovaginal fistula. At the dome of the vaginal canal, a small sinus was identified with clear identification of the feculent debris embedded within the sinus.    She is s/p LAR, takedown of colovaginal fistula, mobilization of splenic flexure, 29 EEA colorectal anastomosis, flex sig, cystoscopy and bilateral retrograde pyelogram, cystogram combo case with urology (Dr. Osborne) on 5/13/24. Path demonstrating sigmoid colon: colon with diverticula, some prominent peridiverticular chronic inflammation and fibrosis. Pericolonic lymph nodes with no significant pathologic findings. She had an uneventful stay and was discharged 5/18/24. She presents today for follow up.     Midline abdominal staples removed in office today. She states her vaginal symptoms have resolved since surgery. Overall feeling well.  No abdominal pain.  Tolerating low fiber diet without nausea or emesis.  Moving bowels without difficulty.  No hematochezia or melena.  No dysuria, hematuria, fecaluria, pneumaturia.  No air or stool per vagina.  No fevers.  Had some issues with hypoglycemia shortly after discharge but these have since resolved.    CT a/p 1/12/24: - Previous hysterectomy. Nonspecific thickening of the vaginal wall. Positive contrast visualized throughout the vaginal canal compatible with rectovaginal fistula. Ill-defined wall thickening near the anorectal junction which is inseparable from the posterior vaginal wall, most likely the site of communication.  - Colonic diverticulosis. Indeterminate mild wall thickening of the proximal-mid sigmoid colon could be related to chronic diverticulitis. Colonic neoplasm not excluded.  - Cirrhosis.  - Portal hypertension changes including splenomegaly and recanalized paraumbilical vein.    MRI pelvis 3/15/24: - Previous hysterectomy.    - Short tract rectovaginal fistula " which extends from the caudal margin of a thickened segment of the mid sigmoid colon to the vaginal cuff.  - Scattered small colonic diverticula of the distal colon with mild circumferential wall thickening of the sigmoid colon and adjacent trace fluid signal.  - Findings could reflect chronic diverticulitis  although component of acute diverticulitis is not excluded.  - Circumferential wall thickening of the urinary bladder may be partially exaggerated by underdistention. Cystitis not excluded.    Non smoker/ No ETOH use/No Illicit Drug use  PMH: HLD, HTN, GERD, DM, CKD,   PSH: Appendectomy, Cholecystectomy, Hysterectomy-'91, Tubal ligation  No Family History of CRC or IBD  Employment:     Past Medical History:   Diagnosis Date    Adverse effect of anesthesia     Anxiety     Arthritis     Cataract     CKD (chronic kidney disease)     Delayed emergence from general anesthesia     Depression     Diabetes mellitus (Multi)     Diverticulosis     Fractures     GERD (gastroesophageal reflux disease)     Gout     History of blood transfusion     History of malignant neoplasm     Hx of mammogram 06/2017    cat 2    Hyperlipidemia     Hypertension     Lichen sclerosus     Lumbar disc disease     Mammogram declined     PONV (postoperative nausea and vomiting)     Type 2 diabetes mellitus (Multi)     Uterine cancer (Multi)      Past Surgical History:   Procedure Laterality Date    APPENDECTOMY  1963    CATARACT EXTRACTION  2018    CHOLECYSTECTOMY  1974    COLONOSCOPY  2019    Incomplete so Barium Enema + tics only    HYSTERECTOMY  1991    uterine CA limited no rad tx nor chemo    JOINT REPLACEMENT      TONSILLECTOMY  1991    TOTAL KNEE ARTHROPLASTY Right 10/2021    TUBAL LIGATION  1977       Review of Systems   Constitutional:  Negative for activity change, appetite change, chills, diaphoresis, fatigue, fever and unexpected weight change.   Respiratory:  Negative for cough, choking, chest tightness, shortness of breath and  wheezing.    Cardiovascular:  Negative for chest pain, palpitations and leg swelling.   Gastrointestinal:  Negative for abdominal distention, abdominal pain, anal bleeding, blood in stool, constipation, diarrhea, nausea, rectal pain and vomiting.   Endocrine: Negative for polydipsia, polyphagia and polyuria.   Genitourinary:  Negative for difficulty urinating, dysuria, frequency, hematuria and vaginal discharge.        No air and stool per vagina.  No hematuria, dysuria, fecaluria, pneumaturia.   Neurological:  Negative for dizziness, seizures, weakness and light-headedness.   Hematological: Negative.    Psychiatric/Behavioral:  Negative for agitation.    All other systems reviewed and are negative.    Current Outpatient Medications   Medication Sig Dispense Refill    acetaminophen (Tylenol) 325 mg tablet Take 2 tablets (650 mg) by mouth every 6 hours if needed for mild pain (1 - 3).      amLODIPine (Norvasc) 5 mg tablet Take 1 tablet (5 mg) by mouth once daily. 90 tablet 3    aspirin 81 mg EC tablet Take 1 tablet (81 mg) by mouth once daily.      cholecalciferol (Vitamin D-3) 5,000 Units tablet Take 1 tablet (5,000 Units) by mouth once daily.      colchicine 0.6 mg tablet TAKE 1 TABLET DAILY AS NEEDED  FOR GOUT 90 tablet 3    hydrocortisone 1 % cream Apply topically 2 times a day. 90 g 3    indapamide (Lozol) 1.25 mg tablet Take 1 tablet (1.25 mg) by mouth once daily. (Patient taking differently: Take 1 tablet (1.25 mg) by mouth once daily in the evening.) 90 tablet 3    insulin lispro (HumaLOG KwikPen Insulin) 100 unit/mL injection Inject 12 Units under the skin 3 times a day with meals. 45 mL 3    KELP ORAL Take 1 capsule by mouth once daily in the evening. (Brown Seaweed Caps)      Lantus Solostar U-100 Insulin 100 unit/mL (3 mL) pen INJECT SUBCUTANEOUSLY 100 UNITS  DAILY (Patient taking differently: Inject 80 Units under the skin once daily at bedtime.) 90 mL 3    losartan-hydrochlorothiazide (Hyzaar) 50-12.5  "mg tablet Take 0.5 tablets by mouth once daily in the evening. 45 tablet 3    magnesium 250 mg tablet Take 1 tablet (250 mg) by mouth once daily in the evening.      metoprolol tartrate (Lopressor) 100 mg tablet Take 1 tablet (100 mg) by mouth 2 times a day. 180 tablet 3    omega 3-dha-epa-fish oil 450 mg (128 mg- 322 mg)-650 mg capsule,delayed release(DR/EC) Take 1 capsule by mouth once daily in the evening.      OneTouch Ultra Test strip TEST 3 TIMES DAILY 300 strip 3    potassium gluconate 595 mg (99 mg) tablet Take 1 tablet by mouth once daily in the evening.      Wor[place]'s wort 300 mg capsule Take 300 mg by mouth once daily in the evening.      Victoza 3-Abiel 0.6 mg/0.1 mL (18 mg/3 mL) injection INJECT SUBCUTANEOUSLY 1.8 MG  DAILY 27 mL 3     No current facility-administered medications for this visit.     Allergies   Allergen Reactions    Codeine Nausea/vomiting    Norgestrel-Ethinyl Estradiol Psychosis    Atorvastatin Myalgia    Enalapril Confusion    Hydroxyzine Itching and Rash     \"Made condition worse\"    Simvastatin Myalgia    Terazosin Confusion         Objective   Physical Exam  Vitals reviewed. Exam conducted with a chaperone present.   Constitutional:       Appearance: Normal appearance.   HENT:      Head: Normocephalic.   Cardiovascular:      Rate and Rhythm: Normal rate and regular rhythm.      Pulses: Normal pulses.      Heart sounds: Normal heart sounds.   Pulmonary:      Effort: Pulmonary effort is normal.      Breath sounds: Normal breath sounds.   Abdominal:      General: Abdomen is flat. Bowel sounds are normal.      Palpations: Abdomen is soft.      Comments: Midline incision C/D/I w/ staples.  Staples removed today in office.   Musculoskeletal:         General: Normal range of motion.      Cervical back: Normal range of motion and neck supple.   Skin:     General: Skin is warm and dry.   Neurological:      General: No focal deficit present.      Mental Status: She is alert and oriented to " person, place, and time.   Psychiatric:         Mood and Affect: Mood normal.         Behavior: Behavior normal.         Assessment/Plan   #Colovaginal fistula s/p open LAR, takedown fistula, mobilization splenic flexure, 29 EEA CRA, flexible sigmoidoscopy, cystoscopy and bilateral retrograde pyelogram, cystogram 5/13/2024  -  Expected post-operative course  -  Staples removed today in office  -  Pathology reviewed with patient  -  OK to start liberalizing diet  -  No strenuous activity or heavy lifting until 6 weeks post-op  -  Colonoscopy scheduled for 9/2024     Haim Argueta MD   6/4/2024  10:20 AM

## 2024-06-04 ENCOUNTER — OFFICE VISIT (OUTPATIENT)
Dept: SURGERY | Facility: CLINIC | Age: 74
End: 2024-06-04
Payer: MEDICARE

## 2024-06-04 VITALS
DIASTOLIC BLOOD PRESSURE: 77 MMHG | HEIGHT: 65 IN | BODY MASS INDEX: 37.32 KG/M2 | SYSTOLIC BLOOD PRESSURE: 142 MMHG | HEART RATE: 76 BPM | WEIGHT: 224 LBS

## 2024-06-04 DIAGNOSIS — N82.4 COLOVAGINAL FISTULA: ICD-10-CM

## 2024-06-04 PROCEDURE — 1159F MED LIST DOCD IN RCRD: CPT | Performed by: STUDENT IN AN ORGANIZED HEALTH CARE EDUCATION/TRAINING PROGRAM

## 2024-06-04 PROCEDURE — 99024 POSTOP FOLLOW-UP VISIT: CPT | Performed by: STUDENT IN AN ORGANIZED HEALTH CARE EDUCATION/TRAINING PROGRAM

## 2024-06-04 PROCEDURE — 1111F DSCHRG MED/CURRENT MED MERGE: CPT | Performed by: STUDENT IN AN ORGANIZED HEALTH CARE EDUCATION/TRAINING PROGRAM

## 2024-06-04 PROCEDURE — 3078F DIAST BP <80 MM HG: CPT | Performed by: STUDENT IN AN ORGANIZED HEALTH CARE EDUCATION/TRAINING PROGRAM

## 2024-06-04 PROCEDURE — 3044F HG A1C LEVEL LT 7.0%: CPT | Performed by: STUDENT IN AN ORGANIZED HEALTH CARE EDUCATION/TRAINING PROGRAM

## 2024-06-04 PROCEDURE — 3008F BODY MASS INDEX DOCD: CPT | Performed by: STUDENT IN AN ORGANIZED HEALTH CARE EDUCATION/TRAINING PROGRAM

## 2024-06-04 PROCEDURE — 3077F SYST BP >= 140 MM HG: CPT | Performed by: STUDENT IN AN ORGANIZED HEALTH CARE EDUCATION/TRAINING PROGRAM

## 2024-06-04 ASSESSMENT — ENCOUNTER SYMPTOMS
POLYDIPSIA: 0
POLYPHAGIA: 0
HEMATOLOGIC/LYMPHATIC NEGATIVE: 1
DIAPHORESIS: 0
SEIZURES: 0

## 2024-07-08 ENCOUNTER — TELEPHONE (OUTPATIENT)
Dept: PRIMARY CARE | Facility: CLINIC | Age: 74
End: 2024-07-08
Payer: MEDICARE

## 2024-07-08 DIAGNOSIS — Z79.4 TYPE 2 DIABETES MELLITUS WITH HYPERGLYCEMIA, WITH LONG-TERM CURRENT USE OF INSULIN (MULTI): ICD-10-CM

## 2024-07-08 DIAGNOSIS — E79.0 HYPERURICEMIA: Primary | ICD-10-CM

## 2024-07-08 DIAGNOSIS — E11.65 TYPE 2 DIABETES MELLITUS WITH HYPERGLYCEMIA, WITH LONG-TERM CURRENT USE OF INSULIN (MULTI): ICD-10-CM

## 2024-07-08 DIAGNOSIS — I10 ESSENTIAL HYPERTENSION: ICD-10-CM

## 2024-07-08 NOTE — TELEPHONE ENCOUNTER
Patient phoned the office requesting labs prior to her upcoming appt. this Friday - The above were the last labs drawn from Dr. Ramirez.

## 2024-07-09 ENCOUNTER — LAB (OUTPATIENT)
Dept: LAB | Facility: LAB | Age: 74
End: 2024-07-09
Payer: MEDICARE

## 2024-07-09 DIAGNOSIS — R39.9 UTI SYMPTOMS: ICD-10-CM

## 2024-07-09 DIAGNOSIS — I10 ESSENTIAL HYPERTENSION: ICD-10-CM

## 2024-07-09 DIAGNOSIS — Z79.4 TYPE 2 DIABETES MELLITUS WITH HYPERGLYCEMIA, WITH LONG-TERM CURRENT USE OF INSULIN (MULTI): ICD-10-CM

## 2024-07-09 DIAGNOSIS — E79.0 HYPERURICEMIA: ICD-10-CM

## 2024-07-09 DIAGNOSIS — E11.65 TYPE 2 DIABETES MELLITUS WITH HYPERGLYCEMIA, WITH LONG-TERM CURRENT USE OF INSULIN (MULTI): ICD-10-CM

## 2024-07-09 LAB
ALBUMIN SERPL BCP-MCNC: 3.8 G/DL (ref 3.4–5)
ALP SERPL-CCNC: 61 U/L (ref 33–136)
ALT SERPL W P-5'-P-CCNC: 17 U/L (ref 7–45)
ANION GAP SERPL CALC-SCNC: 12 MMOL/L (ref 10–20)
APPEARANCE UR: ABNORMAL
AST SERPL W P-5'-P-CCNC: 20 U/L (ref 9–39)
BACTERIA #/AREA URNS AUTO: ABNORMAL /HPF
BASOPHILS # BLD AUTO: 0.03 X10*3/UL (ref 0–0.1)
BASOPHILS NFR BLD AUTO: 0.7 %
BILIRUB SERPL-MCNC: 0.8 MG/DL (ref 0–1.2)
BILIRUB UR STRIP.AUTO-MCNC: NEGATIVE MG/DL
BUN SERPL-MCNC: 27 MG/DL (ref 6–23)
CALCIUM SERPL-MCNC: 9.4 MG/DL (ref 8.6–10.3)
CHLORIDE SERPL-SCNC: 102 MMOL/L (ref 98–107)
CHOLEST SERPL-MCNC: 176 MG/DL (ref 0–199)
CHOLESTEROL/HDL RATIO: 4.2
CO2 SERPL-SCNC: 30 MMOL/L (ref 21–32)
COLOR UR: YELLOW
CREAT SERPL-MCNC: 0.79 MG/DL (ref 0.5–1.05)
EGFRCR SERPLBLD CKD-EPI 2021: 79 ML/MIN/1.73M*2
EOSINOPHIL # BLD AUTO: 0.27 X10*3/UL (ref 0–0.4)
EOSINOPHIL NFR BLD AUTO: 6.1 %
ERYTHROCYTE [DISTWIDTH] IN BLOOD BY AUTOMATED COUNT: 13.8 % (ref 11.5–14.5)
EST. AVERAGE GLUCOSE BLD GHB EST-MCNC: 128 MG/DL
GLUCOSE SERPL-MCNC: 126 MG/DL (ref 74–99)
GLUCOSE UR STRIP.AUTO-MCNC: NORMAL MG/DL
HBA1C MFR BLD: 6.1 %
HCT VFR BLD AUTO: 37.3 % (ref 36–46)
HDLC SERPL-MCNC: 41.5 MG/DL
HGB BLD-MCNC: 12.5 G/DL (ref 12–16)
IMM GRANULOCYTES # BLD AUTO: 0.02 X10*3/UL (ref 0–0.5)
IMM GRANULOCYTES NFR BLD AUTO: 0.4 % (ref 0–0.9)
KETONES UR STRIP.AUTO-MCNC: NEGATIVE MG/DL
LDLC SERPL CALC-MCNC: 99 MG/DL
LEUKOCYTE ESTERASE UR QL STRIP.AUTO: ABNORMAL
LYMPHOCYTES # BLD AUTO: 1.3 X10*3/UL (ref 0.8–3)
LYMPHOCYTES NFR BLD AUTO: 29.1 %
MCH RBC QN AUTO: 30.3 PG (ref 26–34)
MCHC RBC AUTO-ENTMCNC: 33.5 G/DL (ref 32–36)
MCV RBC AUTO: 91 FL (ref 80–100)
MONOCYTES # BLD AUTO: 0.45 X10*3/UL (ref 0.05–0.8)
MONOCYTES NFR BLD AUTO: 10.1 %
MUCOUS THREADS #/AREA URNS AUTO: ABNORMAL /LPF
NEUTROPHILS # BLD AUTO: 2.39 X10*3/UL (ref 1.6–5.5)
NEUTROPHILS NFR BLD AUTO: 53.6 %
NITRITE UR QL STRIP.AUTO: ABNORMAL
NON HDL CHOLESTEROL: 135 MG/DL (ref 0–149)
NRBC BLD-RTO: 0 /100 WBCS (ref 0–0)
PH UR STRIP.AUTO: 6 [PH]
PLATELET # BLD AUTO: 166 X10*3/UL (ref 150–450)
POTASSIUM SERPL-SCNC: 4 MMOL/L (ref 3.5–5.3)
PROT SERPL-MCNC: 6.4 G/DL (ref 6.4–8.2)
PROT UR STRIP.AUTO-MCNC: ABNORMAL MG/DL
RBC # BLD AUTO: 4.12 X10*6/UL (ref 4–5.2)
RBC # UR STRIP.AUTO: ABNORMAL /UL
RBC #/AREA URNS AUTO: >20 /HPF
SODIUM SERPL-SCNC: 140 MMOL/L (ref 136–145)
SP GR UR STRIP.AUTO: 1.02
SQUAMOUS #/AREA URNS AUTO: ABNORMAL /HPF
TRANS CELLS #/AREA UR COMP ASSIST: ABNORMAL /HPF
TRIGL SERPL-MCNC: 176 MG/DL (ref 0–149)
URATE SERPL-MCNC: 8.1 MG/DL (ref 2.3–6.7)
UROBILINOGEN UR STRIP.AUTO-MCNC: NORMAL MG/DL
VLDL: 35 MG/DL (ref 0–40)
WBC # BLD AUTO: 4.5 X10*3/UL (ref 4.4–11.3)
WBC #/AREA URNS AUTO: >50 /HPF
WBC CLUMPS #/AREA URNS AUTO: ABNORMAL /HPF

## 2024-07-09 PROCEDURE — 80061 LIPID PANEL: CPT

## 2024-07-09 PROCEDURE — 81001 URINALYSIS AUTO W/SCOPE: CPT

## 2024-07-09 PROCEDURE — 85025 COMPLETE CBC W/AUTO DIFF WBC: CPT

## 2024-07-09 PROCEDURE — 36415 COLL VENOUS BLD VENIPUNCTURE: CPT

## 2024-07-09 PROCEDURE — 84550 ASSAY OF BLOOD/URIC ACID: CPT

## 2024-07-09 PROCEDURE — 80053 COMPREHEN METABOLIC PANEL: CPT

## 2024-07-09 PROCEDURE — 83036 HEMOGLOBIN GLYCOSYLATED A1C: CPT

## 2024-07-11 PROBLEM — Z23 NEED FOR VACCINATION: Status: RESOLVED | Noted: 2023-03-02 | Resolved: 2024-07-11

## 2024-07-11 PROBLEM — Z12.31 ENCOUNTER FOR SCREENING MAMMOGRAM FOR BREAST CANCER: Status: RESOLVED | Noted: 2023-03-02 | Resolved: 2024-07-11

## 2024-07-12 ENCOUNTER — APPOINTMENT (OUTPATIENT)
Dept: PRIMARY CARE | Facility: CLINIC | Age: 74
End: 2024-07-12
Payer: MEDICARE

## 2024-07-12 ENCOUNTER — TELEPHONE (OUTPATIENT)
Dept: PRIMARY CARE | Facility: CLINIC | Age: 74
End: 2024-07-12

## 2024-07-12 VITALS
BODY MASS INDEX: 36.65 KG/M2 | DIASTOLIC BLOOD PRESSURE: 74 MMHG | RESPIRATION RATE: 16 BRPM | HEART RATE: 62 BPM | SYSTOLIC BLOOD PRESSURE: 126 MMHG | HEIGHT: 65 IN | WEIGHT: 220 LBS | OXYGEN SATURATION: 98 % | TEMPERATURE: 97.3 F

## 2024-07-12 DIAGNOSIS — E78.2 MIXED HYPERLIPIDEMIA: ICD-10-CM

## 2024-07-12 DIAGNOSIS — E11.65 TYPE 2 DIABETES MELLITUS WITH HYPERGLYCEMIA, WITH LONG-TERM CURRENT USE OF INSULIN (MULTI): ICD-10-CM

## 2024-07-12 DIAGNOSIS — Z79.4 TYPE 2 DIABETES MELLITUS WITH HYPERGLYCEMIA, WITH LONG-TERM CURRENT USE OF INSULIN (MULTI): ICD-10-CM

## 2024-07-12 DIAGNOSIS — I10 ESSENTIAL HYPERTENSION: ICD-10-CM

## 2024-07-12 DIAGNOSIS — L90.0 LICHEN SCLEROSUS: ICD-10-CM

## 2024-07-12 DIAGNOSIS — E66.01 CLASS 3 SEVERE OBESITY DUE TO EXCESS CALORIES WITH SERIOUS COMORBIDITY AND BODY MASS INDEX (BMI) OF 40.0 TO 44.9 IN ADULT (MULTI): ICD-10-CM

## 2024-07-12 DIAGNOSIS — Z00.00 ENCOUNTER FOR MEDICARE ANNUAL WELLNESS EXAM: ICD-10-CM

## 2024-07-12 DIAGNOSIS — Z71.89 EARLY INTERVENTION COUNSELING: ICD-10-CM

## 2024-07-12 DIAGNOSIS — Z13.89 MULTIPHASIC SCREENING: Primary | ICD-10-CM

## 2024-07-12 DIAGNOSIS — E79.0 HYPERURICEMIA: ICD-10-CM

## 2024-07-12 DIAGNOSIS — N18.1 CKD STAGE 1 DUE TO TYPE 2 DIABETES MELLITUS (MULTI): ICD-10-CM

## 2024-07-12 DIAGNOSIS — E11.22 CKD STAGE 1 DUE TO TYPE 2 DIABETES MELLITUS (MULTI): ICD-10-CM

## 2024-07-12 PROCEDURE — 3078F DIAST BP <80 MM HG: CPT | Performed by: FAMILY MEDICINE

## 2024-07-12 PROCEDURE — 3048F LDL-C <100 MG/DL: CPT | Performed by: FAMILY MEDICINE

## 2024-07-12 PROCEDURE — 1170F FXNL STATUS ASSESSED: CPT | Performed by: FAMILY MEDICINE

## 2024-07-12 PROCEDURE — 99397 PER PM REEVAL EST PAT 65+ YR: CPT | Performed by: FAMILY MEDICINE

## 2024-07-12 PROCEDURE — 1160F RVW MEDS BY RX/DR IN RCRD: CPT | Performed by: FAMILY MEDICINE

## 2024-07-12 PROCEDURE — G0439 PPPS, SUBSEQ VISIT: HCPCS | Performed by: FAMILY MEDICINE

## 2024-07-12 PROCEDURE — 3044F HG A1C LEVEL LT 7.0%: CPT | Performed by: FAMILY MEDICINE

## 2024-07-12 PROCEDURE — 99497 ADVNCD CARE PLAN 30 MIN: CPT | Performed by: FAMILY MEDICINE

## 2024-07-12 PROCEDURE — G0446 INTENS BEHAVE THER CARDIO DX: HCPCS | Performed by: FAMILY MEDICINE

## 2024-07-12 PROCEDURE — 1159F MED LIST DOCD IN RCRD: CPT | Performed by: FAMILY MEDICINE

## 2024-07-12 PROCEDURE — 3074F SYST BP LT 130 MM HG: CPT | Performed by: FAMILY MEDICINE

## 2024-07-12 PROCEDURE — 1036F TOBACCO NON-USER: CPT | Performed by: FAMILY MEDICINE

## 2024-07-12 PROCEDURE — 3008F BODY MASS INDEX DOCD: CPT | Performed by: FAMILY MEDICINE

## 2024-07-12 PROCEDURE — G0444 DEPRESSION SCREEN ANNUAL: HCPCS | Performed by: FAMILY MEDICINE

## 2024-07-12 RX ORDER — LOSARTAN POTASSIUM AND HYDROCHLOROTHIAZIDE 12.5; 5 MG/1; MG/1
0.5 TABLET ORAL EVERY EVENING
Qty: 45 TABLET | Refills: 3 | Status: SHIPPED | OUTPATIENT
Start: 2024-07-12

## 2024-07-12 RX ORDER — METOPROLOL TARTRATE 100 MG/1
100 TABLET ORAL 2 TIMES DAILY
Qty: 180 TABLET | Refills: 3 | Status: SHIPPED | OUTPATIENT
Start: 2024-07-12

## 2024-07-12 RX ORDER — HYDROCORTISONE 1 %
CREAM (GRAM) TOPICAL 2 TIMES DAILY
Qty: 90 G | Refills: 3 | Status: SHIPPED | OUTPATIENT
Start: 2024-07-12

## 2024-07-12 RX ORDER — AMLODIPINE BESYLATE 5 MG/1
5 TABLET ORAL DAILY
Qty: 90 TABLET | Refills: 3 | Status: SHIPPED | OUTPATIENT
Start: 2024-07-12

## 2024-07-12 RX ORDER — INDAPAMIDE 1.25 MG/1
1.25 TABLET ORAL EVERY EVENING
Qty: 90 TABLET | Refills: 3 | Status: SHIPPED | OUTPATIENT
Start: 2024-07-12

## 2024-07-12 ASSESSMENT — ACTIVITIES OF DAILY LIVING (ADL)
GROCERY_SHOPPING: INDEPENDENT
BATHING: INDEPENDENT
MANAGING_FINANCES: INDEPENDENT
TAKING_MEDICATION: INDEPENDENT
DRESSING: INDEPENDENT
DOING_HOUSEWORK: NEEDS ASSISTANCE

## 2024-07-12 ASSESSMENT — PATIENT HEALTH QUESTIONNAIRE - PHQ9
1. LITTLE INTEREST OR PLEASURE IN DOING THINGS: SEVERAL DAYS
SUM OF ALL RESPONSES TO PHQ9 QUESTIONS 1 AND 2: 2
10. IF YOU CHECKED OFF ANY PROBLEMS, HOW DIFFICULT HAVE THESE PROBLEMS MADE IT FOR YOU TO DO YOUR WORK, TAKE CARE OF THINGS AT HOME, OR GET ALONG WITH OTHER PEOPLE: NOT DIFFICULT AT ALL
2. FEELING DOWN, DEPRESSED OR HOPELESS: SEVERAL DAYS

## 2024-07-12 ASSESSMENT — ENCOUNTER SYMPTOMS
OCCASIONAL FEELINGS OF UNSTEADINESS: 0
LOSS OF SENSATION IN FEET: 1
DEPRESSION: 0

## 2024-07-12 NOTE — PATIENT INSTRUCTIONS
Hospital Medicine Consultation    Date of Service  12/4/2021    Referring Physician  Dr. Vijay M.D.    Consulting Physician  Luis Fitzgerald M.D.    Reason for Consultation  Alcohol use and GI bleed    History of Presenting Illness  65 y.o. female who presented 12/2/2021 with blood in her vomitus.  Ms. Redding has a past medical history of hypertension, gastric bypass, and alcoholism esophagitis that was most recently admitted to this hospital in 23 2021 through 10/28/2021 with hypoxia and a corneal abrasion in the setting of recently relapsed in which she started drinking alcohol again.  She was resuscitated and discharged in stable condition.  She was brought to the emergency room on 12/2/2021 with plaints of blood in her vomitus.  In the emergency room she is noted to be significantly hypotension.  She had taking significant amount of ibuprofen for chronic pain.  The emergency room she is found to have acute kidney injury with a creatinine of 3.46, metabolic acidosis with a bicarb of 10, and a coagulopathy with an INR of 1.9.  She was admitted to the intensive care unit with GI consultation.    12/4/2021: Patient seen and evaluated in the ICU.  Continues to require phosphorus and potassium replacement.  She has not had any black or blood in her stools.  She describes diffuse body aches and is requesting IV narcotic pain medications which we discussed is not indicated.     Review of Systems  Review of Systems   Constitutional: Positive for malaise/fatigue. Negative for chills and fever.        Diffuse body aches   Respiratory: Negative for cough.    Cardiovascular: Negative for chest pain.   Musculoskeletal: Positive for back pain and joint pain.   Neurological: Positive for dizziness.   All other systems reviewed and are negative.      Past Medical History   has a past medical history of Arthritis, Gastric ulcer, Heart burn, Hypertension, Sleep apnea, Snoring, and Urinary incontinence.    Surgical  RSV and Shingles vaccines are recommended. Medicare prefers you get these done at a pharmacy.     

## 2024-07-12 NOTE — TELEPHONE ENCOUNTER
Patient states at checkout that she will need lab orders placed for her six month follow up in January 2025.     Thank you.

## 2024-07-12 NOTE — PROGRESS NOTES
Covid vax: x 2  Flu: n/a  Pneumo: UTD  RSV: advised  Shingles: advised    CRC: 2019  Mammogram: declined  Pap: hysterectomy  Lmp: hysterectomy

## 2024-07-12 NOTE — PROGRESS NOTES
Subjective   Reason for Visit: Vanessa Son is a 74 y.o. female here for a Medicare Wellness visit.   Covid vax: x 2  Flu: n/a  Pneumo: UTD  RSV: advised  Shingles: advised     CRC: 2019  Mammogram: declined ama  Pap: hysterectomy  Lmp: hysterectomy  Was at hospital  Hba1c 6.1  Cmp cr now WNL  Colorectal fistula repair  Driving now doing great  Back to baseline ADLs      CHECKLIST REVIEWED AND COMPLETE FOR [facility] Medicare Annual Wellness Visit Subsequent, Diabetes, Hypertension, and Chronic Kidney Disease  ---------------------------------------------------------------------------------------------  Past Medical, Surgical, and Family History reviewed and updated in chart.    Reviewed all medications by prescribing practitioner or clinical pharmacist (such as prescriptions, OTCs, herbal therapies and supplements) and documented in the medical record.    HPI    Patient Self Assessment of Health Status  Patient Self Assessment: Good    Nutrition and Exercise:    Current Diet: HEALTHY Diet always best, minimizing excess carbs,   weight reduction advised if BMI not WNL, please maintain a NORMAL BMI 18.5-24.9    Adequate Fluid Intake: Yes  Caffeine: -aware to minimize intake, <300mg best to even take in LESS  Exercise Frequency: Regularly advised, weight bearing, strengthening, aerobic    Functional Ability/Level of Safety:  Home safety addressed: no active new concerns,   fall risk addressed  NO new hearing issues or concerns  [place] in ADLs addressed and areas of assistance if present -noted    ANY Cognitive Impairment Observed: No cognitive impairment observed    Home Safety Risk Factors: None  --------------------------------------------------------------------------------------------  Patient Care Team:  Lalitha Ramirez MD as PCP - General    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 (Multi)    Essential hypertension    Gout     "Hyperuricemia    Hematuria    Lichen sclerosus    Primary osteoarthritis of right knee    Status post total knee replacement    Class 3 severe obesity due to excess calories with serious comorbidity and body mass index (BMI) of 40.0 to 44.9 in adult (Multi)    Type 2 diabetes mellitus with hyperglycemia, with long-term current use of insulin (Multi)    Mixed hyperlipidemia    Colovaginal fistula      Past Surgical History:   Procedure Laterality Date    APPENDECTOMY  1963    CATARACT EXTRACTION  2018    CHOLECYSTECTOMY  1974    COLONOSCOPY  2019    Incomplete so Barium Enema + tics only    HYSTERECTOMY  1991    uterine CA limited no rad tx nor chemo    JOINT REPLACEMENT      TONSILLECTOMY  1991    TOTAL KNEE ARTHROPLASTY Right 10/2021    TUBAL LIGATION  1977         PHQ2(-) No active depressed mood or not in crisis, 5min. spent in discussion.    Review of Systems:    NO Seizures  NO CAD  NO CVA    This patient has   NO history of recent Covid nor flu symptoms,  NO Fever nor chills,  NO Chest pain, shortness of breath nor paroxysmal nocturnal dyspnea,  NO Nausea, vomiting, nor diarrhea,  NO Hematochezia nor melena,  NO Dysuria, hematuria, nor new incontinence issues  NO new severe headaches nor neurological complaints,  NO new issues with anxiety nor depression nor new psychiatric complaints,  NO suicidal nor homicidal ideations.  ---------------------------------------------------------------------------------------------   OBJECTIVE:  /74   Pulse 62   Temp 36.3 °C (97.3 °F) (Temporal)   Resp 16   Ht 1.651 m (5' 5\")   Wt 99.8 kg (220 lb)   LMP  (LMP Unknown)   SpO2 98%   BMI 36.61 kg/m²      General:  alert, oriented, no acute distress.  No obvious skin rashes noted.   No gait disturbance noted.    Mood is pleasant, not tearful, no signs of emotional distress.  Not appearing intoxicated or altered.   No voiced delusions,   Normal, appropriate behavior.    HEENT: Normocephalic, atraumatic,   Pupils " round, reactive to light  Extraocular motions intact and wnl  Tympanic membranes normal    Neck: no nuchal rigidity  No masses palpable.  No carotid bruits.  No thyromegaly.    Respiratory: Equal breath sounds  No wheezes,    rales,    nor rhonchi  No respiratory distress.    Heart: Regular rate and rhythm, no    murmurs  no rubs/gallops    Abdomen: no masses palpable, no rebound nor guarding, no rebound nor guarding.overwt    Extremities: NO cyanosis noted, no clubbing.   No edema noted.  2+dorsalis pedis pulses.    Normal-not antalgic, steady gait.  No foot lesions    Lab on 07/09/2024   Component Date Value Ref Range Status    WBC 07/09/2024 4.5  4.4 - 11.3 x10*3/uL Final    nRBC 07/09/2024 0.0  0.0 - 0.0 /100 WBCs Final    RBC 07/09/2024 4.12  4.00 - 5.20 x10*6/uL Final    Hemoglobin 07/09/2024 12.5  12.0 - 16.0 g/dL Final    Hematocrit 07/09/2024 37.3  36.0 - 46.0 % Final    MCV 07/09/2024 91  80 - 100 fL Final    MCH 07/09/2024 30.3  26.0 - 34.0 pg Final    MCHC 07/09/2024 33.5  32.0 - 36.0 g/dL Final    RDW 07/09/2024 13.8  11.5 - 14.5 % Final    Platelets 07/09/2024 166  150 - 450 x10*3/uL Final    Neutrophils % 07/09/2024 53.6  40.0 - 80.0 % Final    Immature Granulocytes %, Automated 07/09/2024 0.4  0.0 - 0.9 % Final    Immature Granulocyte Count (IG) includes promyelocytes, myelocytes and metamyelocytes but does not include bands. Percent differential counts (%) should be interpreted in the context of the absolute cell counts (cells/UL).    Lymphocytes % 07/09/2024 29.1  13.0 - 44.0 % Final    Monocytes % 07/09/2024 10.1  2.0 - 10.0 % Final    Eosinophils % 07/09/2024 6.1  0.0 - 6.0 % Final    Basophils % 07/09/2024 0.7  0.0 - 2.0 % Final    Neutrophils Absolute 07/09/2024 2.39  1.60 - 5.50 x10*3/uL Final    Percent differential counts (%) should be interpreted in the context of the absolute cell counts (cells/uL).    Immature Granulocytes Absolute, Au* 07/09/2024 0.02  0.00 - 0.50 x10*3/uL Final     Lymphocytes Absolute 07/09/2024 1.30  0.80 - 3.00 x10*3/uL Final    Monocytes Absolute 07/09/2024 0.45  0.05 - 0.80 x10*3/uL Final    Eosinophils Absolute 07/09/2024 0.27  0.00 - 0.40 x10*3/uL Final    Basophils Absolute 07/09/2024 0.03  0.00 - 0.10 x10*3/uL Final    Glucose 07/09/2024 126 (H)  74 - 99 mg/dL Final    Sodium 07/09/2024 140  136 - 145 mmol/L Final    Potassium 07/09/2024 4.0  3.5 - 5.3 mmol/L Final    Chloride 07/09/2024 102  98 - 107 mmol/L Final    Bicarbonate 07/09/2024 30  21 - 32 mmol/L Final    Anion Gap 07/09/2024 12  10 - 20 mmol/L Final    Urea Nitrogen 07/09/2024 27 (H)  6 - 23 mg/dL Final    Creatinine 07/09/2024 0.79  0.50 - 1.05 mg/dL Final    eGFR 07/09/2024 79  >60 mL/min/1.73m*2 Final    Calculations of estimated GFR are performed using the 2021 CKD-EPI Study Refit equation without the race variable for the IDMS-Traceable creatinine methods.  https://jasn.asnjournals.org/content/early/2021/09/22/ASN.6166436013    Calcium 07/09/2024 9.4  8.6 - 10.3 mg/dL Final    Albumin 07/09/2024 3.8  3.4 - 5.0 g/dL Final    Alkaline Phosphatase 07/09/2024 61  33 - 136 U/L Final    Total Protein 07/09/2024 6.4  6.4 - 8.2 g/dL Final    AST 07/09/2024 20  9 - 39 U/L Final    Bilirubin, Total 07/09/2024 0.8  0.0 - 1.2 mg/dL Final    ALT 07/09/2024 17  7 - 45 U/L Final    Patients treated with Sulfasalazine may generate falsely decreased results for ALT.    Hemoglobin A1C 07/09/2024 6.1 (H)  see below % Final    Estimated Average Glucose 07/09/2024 128  Not Established mg/dL Final    Cholesterol 07/09/2024 176  0 - 199 mg/dL Final          Age      Desirable   Borderline High   High     0-19 Y     0 - 169       170 - 199     >/= 200    20-24 Y     0 - 189       190 - 224     >/= 225         >24 Y     0 - 199       200 - 239     >/= 240   **All ranges are based on fasting samples. Specific   therapeutic targets will vary based on patient-specific   cardiac risk.    Pediatric guidelines reference:Pediatrics  2011, 128(S5).Adult guidelines reference: NCEP ATPIII Guidelines,ASHLIEGH 2001, 258:2486-97    Venipuncture immediately after or during the administration of Metamizole may lead to falsely low results. Testing should be performed immediately prior to Metamizole dosing.    HDL-Cholesterol 07/09/2024 41.5  mg/dL Final      Age       Very Low   Low     Normal    High    0-19 Y    < 35      < 40     40-45     ----  20-24 Y    ----     < 40      >45      ----        >24 Y      ----     < 40     40-60      >60      Cholesterol/HDL Ratio 07/09/2024 4.2   Final      Ref Values  Desirable  < 3.4  High Risk  > 5.0    LDL Calculated 07/09/2024 99  <=99 mg/dL Final                                Near   Borderline      AGE      Desirable  Optimal    High     High     Very High     0-19 Y     0 - 109     ---    110-129   >/= 130     ----    20-24 Y     0 - 119     ---    120-159   >/= 160     ----      >24 Y     0 -  99   100-129  130-159   160-189     >/=190      VLDL 07/09/2024 35  0 - 40 mg/dL Final    Triglycerides 07/09/2024 176 (H)  0 - 149 mg/dL Final       Age         Desirable   Borderline High   High     Very High   0 D-90 D    19 - 174         ----         ----        ----  91 D- 9 Y     0 -  74        75 -  99     >/= 100      ----    10-19 Y     0 -  89        90 - 129     >/= 130      ----    20-24 Y     0 - 114       115 - 149     >/= 150      ----         >24 Y     0 - 149       150 - 199    200- 499    >/= 500    Venipuncture immediately after or during the administration of Metamizole may lead to falsely low results. Testing should be performed immediately prior to Metamizole dosing.    Non HDL Cholesterol 07/09/2024 135  0 - 149 mg/dL Final          Age       Desirable   Borderline High   High     Very High     0-19 Y     0 - 119       120 - 144     >/= 145    >/= 160    20-24 Y     0 - 149       150 - 189     >/= 190      ----         >24 Y    30 mg/dL above LDL Cholesterol goal      Uric Acid 07/09/2024 8.1 (H)   2.3 - 6.7 mg/dL Final    Venipuncture immediately after or during the administration of Metamizole may lead to falsely low results. Testing should be performed immediately  prior to Metamizole dosing.    Color, Urine 07/09/2024 Yellow  Light-Yellow, Yellow, Dark-Yellow Final    Appearance, Urine 07/09/2024 Turbid (N)  Clear Final    Specific Gravity, Urine 07/09/2024 1.022  1.005 - 1.035 Final    pH, Urine 07/09/2024 6.0  5.0, 5.5, 6.0, 6.5, 7.0, 7.5, 8.0 Final    Protein, Urine 07/09/2024 30 (1+) (A)  NEGATIVE, 10 (TRACE), 20 (TRACE) mg/dL Final    Glucose, Urine 07/09/2024 Normal  Normal mg/dL Final    Blood, Urine 07/09/2024 0.06 (1+) (A)  NEGATIVE Final    Ketones, Urine 07/09/2024 NEGATIVE  NEGATIVE mg/dL Final    Bilirubin, Urine 07/09/2024 NEGATIVE  NEGATIVE Final    Urobilinogen, Urine 07/09/2024 Normal  Normal mg/dL Final    Nitrite, Urine 07/09/2024 2+ (A)  NEGATIVE Final    Leukocyte Esterase, Urine 07/09/2024 500 [place]/µL (A)  NEGATIVE Final    WBC, Urine 07/09/2024 >50 (A)  1-5, NONE /HPF Final    WBC Clumps, Urine 07/09/2024 OCCASIONAL  Reference range not established. /HPF Final    RBC, Urine 07/09/2024 >20 (A)  NONE, 1-2, 3-5 /HPF Final    Squamous Epithelial Cells, Urine 07/09/2024 10-25 (FEW)  Reference range not established. /HPF Final    Transitional Epithelial Cells, Uri* 07/09/2024 1-2 (FEW)  Reference range not established. /HPF Final    Bacteria, Urine 07/09/2024 1+ (A)  NONE SEEN /HPF Final    Mucus, Urine 07/09/2024 FEW  Reference range not established. /LPF Final   Admission on 05/13/2024, Discharged on 05/18/2024   Component Date Value Ref Range Status    POCT Glucose 05/13/2024 131 (H)  74 - 99 mg/dL Final    Case Report 05/13/2024    Final                    Value:Surgical Pathology                                Case: [id number]                                  Authorizing Provider:  Haim Argueta MD          Collected:           05/13/2024 1010              Ordering Location:       [facility] [place]ceived:            05/13/2024 1349                                     OR                                                                           Pathologist:           Shoaib Dodson MD                                                          Specimen:    COLON - SIGMOID RESECTION, SIGMOID COLON AND ANASTOMOTIC DONUTS                            FINAL DIAGNOSIS 05/13/2024    Final                    Value:This result contains rich text formatting which cannot be displayed here.      05/13/2024    Final                    Value:This result contains rich text formatting which cannot be displayed here.    Resident Review 05/13/2024    Final                    Value:This result contains rich text formatting which cannot be displayed here.    Addendum 05/13/2024    Final                    Value:This result contains rich text formatting which cannot be displayed here.    Clinical History 05/13/2024    Final                    Value:This result contains rich text formatting which cannot be displayed here.    Gross Description 05/13/2024    Final                    Value:This result contains rich text formatting which cannot be displayed here.    POCT Glucose 05/13/2024 226 (H)  74 - 99 mg/dL Final    POCT Glucose 05/13/2024 246 (H)  74 - 99 mg/dL Final    POCT Glucose 05/13/2024 286 (H)  74 - 99 mg/dL Final    WBC 05/14/2024 8.4  4.4 - 11.3 x10*3/uL Final    nRBC 05/14/2024 0.0  0.0 - 0.0 /100 WBCs Final    RBC 05/14/2024 3.74 (L)  4.00 - 5.20 x10*6/uL Final    Hemoglobin 05/14/2024 11.5 (L)  12.0 - 16.0 g/dL Final    Hematocrit 05/14/2024 33.2 (L)  36.0 - 46.0 % Final    MCV 05/14/2024 89  80 - 100 fL Final    MCH 05/14/2024 30.7  26.0 - 34.0 pg Final    MCHC 05/14/2024 34.6  32.0 - 36.0 g/dL Final    RDW 05/14/2024 13.7  11.5 - 14.5 % Final    Platelets 05/14/2024 151  150 - 450 x10*3/uL Final    Glucose 05/14/2024 316 (H)  74 - 99 mg/dL Final    Sodium 05/14/2024 133 (L)  136 - 145 mmol/L  Final    Potassium 05/14/2024 3.9  3.5 - 5.3 mmol/L Final    Chloride 05/14/2024 97 (L)  98 - 107 mmol/L Final    Bicarbonate 05/14/2024 27  21 - 32 mmol/L Final    Anion Gap 05/14/2024 13  10 - 20 mmol/L Final    Urea Nitrogen 05/14/2024 39 (H)  6 - 23 mg/dL Final    Creatinine 05/14/2024 1.22 (H)  0.50 - 1.05 mg/dL Final    eGFR 05/14/2024 47 (L)  >60 mL/min/1.73m*2 Final    Calculations of estimated GFR are performed using the 2021 CKD-EPI Study Refit equation without the race variable for the IDMS-Traceable creatinine methods.  https://jasn.asnjournals.org/content/early/2021/09/22/ASN.1834707431    Calcium 05/14/2024 8.4 (L)  8.6 - 10.3 mg/dL Final    Phosphorus 05/14/2024 5.4 (H)  2.5 - 4.9 mg/dL Final    The performance characteristics of phosphorus testing in heparinized plasma have been validated by the individual  laboratory site where testing is performed. Testing on heparinized plasma is not approved by the FDA; however, such approval is not necessary.    Albumin 05/14/2024 3.2 (L)  3.4 - 5.0 g/dL Final    Magnesium 05/14/2024 1.67  1.60 - 2.40 mg/dL Final    POCT Glucose 05/13/2024 291 (H)  74 - 99 mg/dL Final    RN/MD NOTIFIED    POCT Glucose 05/14/2024 303 (H)  74 - 99 mg/dL Final    POCT Glucose 05/14/2024 300 (H)  74 - 99 mg/dL Final    POCT Glucose 05/14/2024 267 (H)  74 - 99 mg/dL Final    POCT Glucose 05/14/2024 318 (H)  74 - 99 mg/dL Final    RN/MD NOTIFIED    WBC 05/15/2024 7.0  4.4 - 11.3 x10*3/uL Final    nRBC 05/15/2024 0.0  0.0 - 0.0 /100 WBCs Final    RBC 05/15/2024 3.39 (L)  4.00 - 5.20 x10*6/uL Final    Hemoglobin 05/15/2024 10.4 (L)  12.0 - 16.0 g/dL Final    Hematocrit 05/15/2024 30.6 (L)  36.0 - 46.0 % Final    MCV 05/15/2024 90  80 - 100 fL Final    MCH 05/15/2024 30.7  26.0 - 34.0 pg Final    MCHC 05/15/2024 34.0  32.0 - 36.0 g/dL Final    RDW 05/15/2024 13.8  11.5 - 14.5 % Final    Platelets 05/15/2024 134 (L)  150 - 450 x10*3/uL Final    Glucose 05/15/2024 268 (H)  74 - 99 mg/dL  Final    Sodium 05/15/2024 131 (L)  136 - 145 mmol/L Final    Potassium 05/15/2024 3.5  3.5 - 5.3 mmol/L Final    Chloride 05/15/2024 96 (L)  98 - 107 mmol/L Final    Bicarbonate 05/15/2024 26  21 - 32 mmol/L Final    Anion Gap 05/15/2024 13  10 - 20 mmol/L Final    Urea Nitrogen 05/15/2024 51 (H)  6 - 23 mg/dL Final    Creatinine 05/15/2024 1.15 (H)  0.50 - 1.05 mg/dL Final    eGFR 05/15/2024 50 (L)  >60 mL/min/1.73m*2 Final    Calculations of estimated GFR are performed using the 2021 CKD-EPI Study Refit equation without the race variable for the IDMS-Traceable creatinine methods.  https://jasn.asnjournals.org/content/early/2021/09/22/ASN.4515456331    Calcium 05/15/2024 8.5 (L)  8.6 - 10.3 mg/dL Final    Magnesium 05/15/2024 1.81  1.60 - 2.40 mg/dL Final    Phosphorus 05/15/2024 1.9 (L)  2.5 - 4.9 mg/dL Final    The performance characteristics of phosphorus testing in heparinized plasma have been validated by the individual  laboratory site where testing is performed. Testing on heparinized plasma is not approved by the FDA; however, such approval is not necessary.    POCT Glucose 05/15/2024 282 (H)  74 - 99 mg/dL Final    POCT Glucose 05/15/2024 296 (H)  74 - 99 mg/dL Final    POCT Glucose 05/15/2024 298 (H)  74 - 99 mg/dL Final    POCT Glucose 05/15/2024 310 (H)  74 - 99 mg/dL Final    RN/MD NOTIFIED    WBC 05/16/2024 5.2  4.4 - 11.3 x10*3/uL Final    nRBC 05/16/2024 0.0  0.0 - 0.0 /100 WBCs Final    RBC 05/16/2024 3.25 (L)  4.00 - 5.20 x10*6/uL Final    Hemoglobin 05/16/2024 10.0 (L)  12.0 - 16.0 g/dL Final    Hematocrit 05/16/2024 29.5 (L)  36.0 - 46.0 % Final    MCV 05/16/2024 91  80 - 100 fL Final    MCH 05/16/2024 30.8  26.0 - 34.0 pg Final    MCHC 05/16/2024 33.9  32.0 - 36.0 g/dL Final    RDW 05/16/2024 13.5  11.5 - 14.5 % Final    Platelets 05/16/2024 111 (L)  150 - 450 x10*3/uL Final    Glucose 05/16/2024 264 (H)  74 - 99 mg/dL Final    Sodium 05/16/2024 133 (L)  136 - 145 mmol/L Final    Potassium  05/16/2024 3.5  3.5 - 5.3 mmol/L Final    Chloride 05/16/2024 98  98 - 107 mmol/L Final    Bicarbonate 05/16/2024 28  21 - 32 mmol/L Final    Anion Gap 05/16/2024 11  10 - 20 mmol/L Final    Urea Nitrogen 05/16/2024 41 (H)  6 - 23 mg/dL Final    Creatinine 05/16/2024 0.84  0.50 - 1.05 mg/dL Final    eGFR 05/16/2024 73  >60 mL/min/1.73m*2 Final    Calculations of estimated GFR are performed using the 2021 CKD-EPI Study Refit equation without the race variable for the IDMS-Traceable creatinine methods.  https://jasn.asnjournals.org/content/early/2021/09/22/ASN.3816292390    Calcium 05/16/2024 8.5 (L)  8.6 - 10.3 mg/dL Final    Magnesium 05/16/2024 2.07  1.60 - 2.40 mg/dL Final    Phosphorus 05/16/2024 1.3 (L)  2.5 - 4.9 mg/dL Final    The performance characteristics of phosphorus testing in heparinized plasma have been validated by the individual  laboratory site where testing is performed. Testing on heparinized plasma is not approved by the FDA; however, such approval is not necessary.    POCT Glucose 05/16/2024 298 (H)  74 - 99 mg/dL Final    POCT Glucose 05/16/2024 286 (H)  74 - 99 mg/dL Final    POCT Glucose 05/16/2024 281 (H)  74 - 99 mg/dL Final    WBC 05/17/2024 4.9  4.4 - 11.3 x10*3/uL Final    nRBC 05/17/2024 0.0  0.0 - 0.0 /100 WBCs Final    RBC 05/17/2024 3.18 (L)  4.00 - 5.20 x10*6/uL Final    Hemoglobin 05/17/2024 9.8 (L)  12.0 - 16.0 g/dL Final    Hematocrit 05/17/2024 28.5 (L)  36.0 - 46.0 % Final    MCV 05/17/2024 90  80 - 100 fL Final    MCH 05/17/2024 30.8  26.0 - 34.0 pg Final    MCHC 05/17/2024 34.4  32.0 - 36.0 g/dL Final    RDW 05/17/2024 13.6  11.5 - 14.5 % Final    Platelets 05/17/2024 128 (L)  150 - 450 x10*3/uL Final    Glucose 05/17/2024 189 (H)  74 - 99 mg/dL Final    Sodium 05/17/2024 136  136 - 145 mmol/L Final    Potassium 05/17/2024 3.2 (L)  3.5 - 5.3 mmol/L Final    Chloride 05/17/2024 98  98 - 107 mmol/L Final    Bicarbonate 05/17/2024 33 (H)  21 - 32 mmol/L Final    Anion Gap  05/17/2024 8 (L)  10 - 20 mmol/L Final    Urea Nitrogen 05/17/2024 29 (H)  6 - 23 mg/dL Final    Creatinine 05/17/2024 0.76  0.50 - 1.05 mg/dL Final    eGFR 05/17/2024 83  >60 mL/min/1.73m*2 Final    Calculations of estimated GFR are performed using the 2021 CKD-EPI Study Refit equation without the race variable for the IDMS-Traceable creatinine methods.  https://jasn.asnjournals.org/content/early/2021/09/22/ASN.9642634204    Calcium 05/17/2024 8.3 (L)  8.6 - 10.3 mg/dL Final    Magnesium 05/17/2024 2.08  1.60 - 2.40 mg/dL Final    Phosphorus 05/17/2024 1.5 (L)  2.5 - 4.9 mg/dL Final    The performance characteristics of phosphorus testing in heparinized plasma have been validated by the individual  laboratory site where testing is performed. Testing on heparinized plasma is not approved by the FDA; however, such approval is not necessary.    POCT Glucose 05/16/2024 308 (H)  74 - 99 mg/dL Final    RN/MD NOTIFIED    POCT Glucose 05/17/2024 195 (H)  74 - 99 mg/dL Final    POCT Glucose 05/17/2024 161 (H)  74 - 99 mg/dL Final    POCT Glucose 05/17/2024 172 (H)  74 - 99 mg/dL Final    WBC 05/18/2024 5.1  4.4 - 11.3 x10*3/uL Final    nRBC 05/18/2024 0.0  0.0 - 0.0 /100 WBCs Final    RBC 05/18/2024 3.20 (L)  4.00 - 5.20 x10*6/uL Final    Hemoglobin 05/18/2024 9.7 (L)  12.0 - 16.0 g/dL Final    Hematocrit 05/18/2024 29.0 (L)  36.0 - 46.0 % Final    MCV 05/18/2024 91  80 - 100 fL Final    MCH 05/18/2024 30.3  26.0 - 34.0 pg Final    MCHC 05/18/2024 33.4  32.0 - 36.0 g/dL Final    RDW 05/18/2024 13.6  11.5 - 14.5 % Final    Platelets 05/18/2024 146 (L)  150 - 450 x10*3/uL Final    Glucose 05/18/2024 95  74 - 99 mg/dL Final    Sodium 05/18/2024 138  136 - 145 mmol/L Final    Potassium 05/18/2024 3.1 (L)  3.5 - 5.3 mmol/L Final    Chloride 05/18/2024 100  98 - 107 mmol/L Final    Bicarbonate 05/18/2024 32  21 - 32 mmol/L Final    Anion Gap 05/18/2024 9 (L)  10 - 20 mmol/L Final    Urea Nitrogen 05/18/2024 22  6 - 23 mg/dL  Final    Creatinine 05/18/2024 0.61  0.50 - 1.05 mg/dL Final    eGFR 05/18/2024 >90  >60 mL/min/1.73m*2 Final    Calculations of estimated GFR are performed using the 2021 CKD-EPI Study Refit equation without the race variable for the IDMS-Traceable creatinine methods.  https://jasn.asnjournals.org/content/early/2021/09/22/ASN.5822685254    Calcium 05/18/2024 8.0 (L)  8.6 - 10.3 mg/dL Final    Albumin 05/18/2024 3.0 (L)  3.4 - 5.0 g/dL Final    Alkaline Phosphatase 05/18/2024 57  33 - 136 U/L Final    Total Protein 05/18/2024 5.3 (L)  6.4 - 8.2 g/dL Final    AST 05/18/2024 18  9 - 39 U/L Final    Bilirubin, Total 05/18/2024 0.7  0.0 - 1.2 mg/dL Final    ALT 05/18/2024 17  7 - 45 U/L Final    Patients treated with Sulfasalazine may generate falsely decreased results for ALT.    Magnesium 05/18/2024 2.06  1.60 - 2.40 mg/dL Final    Phosphorus 05/18/2024 2.1 (L)  2.5 - 4.9 mg/dL Final    The performance characteristics of phosphorus testing in heparinized plasma have been validated by the individual  laboratory site where testing is performed. Testing on heparinized plasma is not approved by the FDA; however, such approval is not necessary.    POCT Glucose 05/17/2024 196 (H)  74 - 99 mg/dL Final    RN/MD NOTIFIED    POCT Glucose 05/18/2024 104 (H)  74 - 99 mg/dL Final   Lab on 05/10/2024   Component Date Value Ref Range Status    ABO TYPE 05/10/2024 A   Final    Rh TYPE 05/10/2024 NEG   Final    ANTIBODY SCREEN 05/10/2024 POS   Final    Antibody ID 05/10/2024 All clinically significant antibodies excluded   Final    PRODUCT CODE 05/12/2024 [id number]   Final    Unit Number 05/12/2024 [id number]   Final    Unit ABO 05/12/2024 A   Final    Unit RH 05/12/2024 NEG   Final    XM INTEP 05/12/2024 COMP   Final    Dispense Status 05/12/2024 RE   Final    Blood Expiration Date 05/12/2024 May 30, 2024 23:59 EDT   Final    PRODUCT BLOOD TYPE 05/12/2024 0600   Final    UNIT VOLUME 05/12/2024 350   Final-Edited    PRODUCT CODE  05/12/2024 [id number]   Final    Unit Number 05/12/2024 [id number]   Final    Unit ABO 05/12/2024 A   Final    Unit RH 05/12/2024 NEG   Final    XM INTEP 05/12/2024 COMP   Final    Dispense Status 05/12/2024 RE   Final    Blood Expiration Date 05/12/2024 May 30, 2024 23:59 EDT   Final    PRODUCT BLOOD TYPE 05/12/2024 0600   Final    UNIT VOLUME 05/12/2024 350   Final-Edited   Lab on 04/29/2024   Component Date Value Ref Range Status    Glucose 04/29/2024 135 (H)  74 - 99 mg/dL Final    Sodium 04/29/2024 140  136 - 145 mmol/L Final    Potassium 04/29/2024 4.1  3.5 - 5.3 mmol/L Final    Chloride 04/29/2024 101  98 - 107 mmol/L Final    Bicarbonate 04/29/2024 32  21 - 32 mmol/L Final    Anion Gap 04/29/2024 11  10 - 20 mmol/L Final    Urea Nitrogen 04/29/2024 20  6 - 23 mg/dL Final    Creatinine 04/29/2024 0.81  0.50 - 1.05 mg/dL Final    eGFR 04/29/2024 77  >60 mL/min/1.73m*2 Final    Calculations of estimated GFR are performed using the 2021 CKD-EPI Study Refit equation without the race variable for the IDMS-Traceable creatinine methods.  https://jasn.asnjournals.org/content/early/2021/09/22/ASN.5895484315    Calcium 04/29/2024 9.8  8.6 - 10.3 mg/dL Final    Albumin 04/29/2024 4.0  3.4 - 5.0 g/dL Final    Alkaline Phosphatase 04/29/2024 51  33 - 136 U/L Final    Total Protein 04/29/2024 6.6  6.4 - 8.2 g/dL Final    AST 04/29/2024 23  9 - 39 U/L Final    Bilirubin, Total 04/29/2024 0.7  0.0 - 1.2 mg/dL Final    ALT 04/29/2024 21  7 - 45 U/L Final    Patients treated with Sulfasalazine may generate falsely decreased results for ALT.    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)    Essential hypertension    Relevant Medications    indapamide (Lozol) 1.25 mg tablet    metoprolol tartrate (Lopressor) 100 mg tablet    losartan-hydrochlorothiazide (Hyzaar) 50-12.5 mg tablet    amLODIPine (Norvasc) 5 mg tablet    Lichen sclerosus    Relevant Medications    hydrocortisone 1 % cream    Class 3 severe obesity due to excess calories with serious comorbidity and body mass index (BMI) of 40.0 to 44.9 in adult (Multi)    Type 2 diabetes mellitus with hyperglycemia, with long-term current use of insulin (Multi)    Mixed hyperlipidemia     Other Visit Diagnoses       Multiphasic screening    -  Primary    Encounter for Medicare annual wellness exam        Early intervention counseling              Advance Care Planning Note   Discussion Date: 07/12/24   Discussion Participants: patient  16 min spent discussing ACP w pt    The patient wishes to discuss Advance Care Planning today and the following is a brief summary of our discussion.     Patient has capacity to make their own medical decisions: Yes  Health Care Agent/Surrogate Decision Maker documented in chart: Yes    Documents on file and valid:  Advance Directive/Living Will: Yes  will bring  Health Care Power of : Yes  will bring  Communication of Medical Status/Prognosis:   yes   Communication of Treatment Goals/Options:   yes  Treatment Decisions/involved with patient today  yes  Time Statement: Total face to face time spent on advance care planning was <30 minutes with <30 minutes spent in counseling, including the explanation.    SEE ME AT NEXT REGULARLY SCHEDULED VISIT-sooner if condition deteriorates or new problems arise.    PATIENT WOULD LIKE TO BE A FULL CODE    NO uncontrolled DEPRESSION noted  Assessed and reviewed for  opioid use.  NO EVIDENCE OF SIGNIFICANT DEMENTIA    Signs and symptoms of concern with depression-if in crisis -(no current HI/SI) will let us know and proceed to ER   Signs/symptoms of concern with dementia-and need to contact us if they occur discussed.    ASCVD   %   addressed and risk reduction conversation took place    All above counseling 15 minutes in conversation/documentation etc    Mood counseling 5min- no uncontrolled depression, good support system, has crisis plan if occurs.  Included BMI counseling and options if BMI>30        QUESTIONS answered  And again had a lengthy discussion w pt  about risks of poorly controlled diabetes including micro and macrovascular complications of DM2 including blindness,MI,CVA and death among other possibilities. Pt aware and agrees to better -or if good control-continued compliance and adherance to instructions such as regular eye exams q 1-2 y, foot exams,and f/u regularly for hba1c with a goal of 6.5.    Follow up as planned for hba1c and BP checks REGULARLY.    6mo labs  On [facility] mood  Decrease lantus to 65 if still occ lows      Next visit addressed -regular visit as scheduled and follow up sooner if condition deterioration or new problems arise.    This completes Vanessa Son ANNUAL MEDICARE WELLNESS VISIT today.  If no other follow ups discussed: Please follow up in 1 year for NEXT ANNUAL MEDICARE WELLNESS VISIT.    Lalitha Ramirez MD    This documentation is subject to inadvertent typing and other similar clerical/grammatic errors etc.

## 2024-07-15 ENCOUNTER — TELEPHONE (OUTPATIENT)
Dept: PRIMARY CARE | Facility: CLINIC | Age: 74
End: 2024-07-15
Payer: MEDICARE

## 2024-07-15 DIAGNOSIS — Z79.4 TYPE 2 DIABETES MELLITUS WITH HYPERGLYCEMIA, WITH LONG-TERM CURRENT USE OF INSULIN (MULTI): ICD-10-CM

## 2024-07-15 DIAGNOSIS — E11.65 TYPE 2 DIABETES MELLITUS WITH HYPERGLYCEMIA, WITH LONG-TERM CURRENT USE OF INSULIN (MULTI): ICD-10-CM

## 2024-07-15 RX ORDER — LANCETS 33 GAUGE
EACH MISCELLANEOUS
Qty: 300 EACH | Refills: 3 | Status: SHIPPED | OUTPATIENT
Start: 2024-07-15

## 2024-07-18 DIAGNOSIS — N18.1 CKD STAGE 1 DUE TO TYPE 2 DIABETES MELLITUS (MULTI): ICD-10-CM

## 2024-07-18 DIAGNOSIS — M10.9 GOUT, UNSPECIFIED CAUSE, UNSPECIFIED CHRONICITY, UNSPECIFIED SITE: ICD-10-CM

## 2024-07-18 DIAGNOSIS — E11.22 CKD STAGE 1 DUE TO TYPE 2 DIABETES MELLITUS (MULTI): ICD-10-CM

## 2024-07-22 RX ORDER — INSULIN GLARGINE 100 [IU]/ML
INJECTION, SOLUTION SUBCUTANEOUS
Qty: 90 ML | Refills: 3 | Status: SHIPPED | OUTPATIENT
Start: 2024-07-22

## 2024-07-22 RX ORDER — COLCHICINE 0.6 MG/1
TABLET ORAL
Qty: 90 TABLET | Refills: 3 | Status: SHIPPED | OUTPATIENT
Start: 2024-07-22

## 2024-09-18 ENCOUNTER — PREP FOR PROCEDURE (OUTPATIENT)
Dept: SURGERY | Facility: HOSPITAL | Age: 74
End: 2024-09-18
Payer: MEDICARE

## 2024-09-18 RX ORDER — SODIUM CHLORIDE, SODIUM LACTATE, POTASSIUM CHLORIDE, CALCIUM CHLORIDE 600; 310; 30; 20 MG/100ML; MG/100ML; MG/100ML; MG/100ML
100 INJECTION, SOLUTION INTRAVENOUS CONTINUOUS
Status: CANCELLED | OUTPATIENT
Start: 2024-09-18

## 2024-09-19 ENCOUNTER — ANESTHESIA EVENT (OUTPATIENT)
Dept: GASTROENTEROLOGY | Facility: HOSPITAL | Age: 74
End: 2024-09-19
Payer: MEDICARE

## 2024-09-19 ENCOUNTER — HOSPITAL ENCOUNTER (OUTPATIENT)
Dept: GASTROENTEROLOGY | Facility: HOSPITAL | Age: 74
Setting detail: OUTPATIENT SURGERY
Discharge: HOME | End: 2024-09-19
Payer: MEDICARE

## 2024-09-19 ENCOUNTER — ANESTHESIA (OUTPATIENT)
Dept: GASTROENTEROLOGY | Facility: HOSPITAL | Age: 74
End: 2024-09-19
Payer: MEDICARE

## 2024-09-19 VITALS
HEART RATE: 61 BPM | HEIGHT: 65 IN | BODY MASS INDEX: 36.65 KG/M2 | SYSTOLIC BLOOD PRESSURE: 155 MMHG | DIASTOLIC BLOOD PRESSURE: 74 MMHG | RESPIRATION RATE: 18 BRPM | OXYGEN SATURATION: 98 % | WEIGHT: 220 LBS | TEMPERATURE: 97.2 F

## 2024-09-19 DIAGNOSIS — N82.4 COLOVAGINAL FISTULA: ICD-10-CM

## 2024-09-19 LAB — GLUCOSE BLD MANUAL STRIP-MCNC: 141 MG/DL (ref 74–99)

## 2024-09-19 PROCEDURE — 82947 ASSAY GLUCOSE BLOOD QUANT: CPT

## 2024-09-19 PROCEDURE — 99100 ANES PT EXTEME AGE<1 YR&>70: CPT | Performed by: ANESTHESIOLOGY

## 2024-09-19 PROCEDURE — A45385 PR COLONOSCOPY,REMV LESN,SNARE: Performed by: ANESTHESIOLOGY

## 2024-09-19 PROCEDURE — A45385 PR COLONOSCOPY,REMV LESN,SNARE: Performed by: NURSE ANESTHETIST, CERTIFIED REGISTERED

## 2024-09-19 PROCEDURE — 2500000005 HC RX 250 GENERAL PHARMACY W/O HCPCS: Performed by: NURSE ANESTHETIST, CERTIFIED REGISTERED

## 2024-09-19 PROCEDURE — 2500000004 HC RX 250 GENERAL PHARMACY W/ HCPCS (ALT 636 FOR OP/ED): Performed by: NURSE ANESTHETIST, CERTIFIED REGISTERED

## 2024-09-19 PROCEDURE — 45385 COLONOSCOPY W/LESION REMOVAL: CPT | Performed by: STUDENT IN AN ORGANIZED HEALTH CARE EDUCATION/TRAINING PROGRAM

## 2024-09-19 RX ORDER — ONDANSETRON HYDROCHLORIDE 2 MG/ML
4 INJECTION, SOLUTION INTRAVENOUS ONCE AS NEEDED
Status: DISCONTINUED | OUTPATIENT
Start: 2024-09-19 | End: 2024-09-20 | Stop reason: HOSPADM

## 2024-09-19 RX ORDER — SODIUM CHLORIDE, SODIUM LACTATE, POTASSIUM CHLORIDE, CALCIUM CHLORIDE 600; 310; 30; 20 MG/100ML; MG/100ML; MG/100ML; MG/100ML
100 INJECTION, SOLUTION INTRAVENOUS CONTINUOUS
Status: DISCONTINUED | OUTPATIENT
Start: 2024-09-19 | End: 2024-09-20 | Stop reason: HOSPADM

## 2024-09-19 RX ORDER — SODIUM CHLORIDE, SODIUM LACTATE, POTASSIUM CHLORIDE, CALCIUM CHLORIDE 600; 310; 30; 20 MG/100ML; MG/100ML; MG/100ML; MG/100ML
INJECTION, SOLUTION INTRAVENOUS CONTINUOUS PRN
Status: DISCONTINUED | OUTPATIENT
Start: 2024-09-19 | End: 2024-09-19

## 2024-09-19 RX ORDER — LIDOCAINE HYDROCHLORIDE 10 MG/ML
0.1 INJECTION, SOLUTION INFILTRATION; PERINEURAL ONCE
OUTPATIENT
Start: 2024-09-19 | End: 2024-09-19

## 2024-09-19 RX ORDER — LIDOCAINE HYDROCHLORIDE 20 MG/ML
INJECTION, SOLUTION EPIDURAL; INFILTRATION; INTRACAUDAL; PERINEURAL AS NEEDED
Status: DISCONTINUED | OUTPATIENT
Start: 2024-09-19 | End: 2024-09-19

## 2024-09-19 RX ORDER — PROPOFOL 10 MG/ML
INJECTION, EMULSION INTRAVENOUS AS NEEDED
Status: DISCONTINUED | OUTPATIENT
Start: 2024-09-19 | End: 2024-09-19

## 2024-09-19 ASSESSMENT — PAIN SCALES - GENERAL
PAINLEVEL_OUTOF10: 0 - NO PAIN

## 2024-09-19 ASSESSMENT — COLUMBIA-SUICIDE SEVERITY RATING SCALE - C-SSRS
6. HAVE YOU EVER DONE ANYTHING, STARTED TO DO ANYTHING, OR PREPARED TO DO ANYTHING TO END YOUR LIFE?: NO
1. IN THE PAST MONTH, HAVE YOU WISHED YOU WERE DEAD OR WISHED YOU COULD GO TO SLEEP AND NOT WAKE UP?: NO
2. HAVE YOU ACTUALLY HAD ANY THOUGHTS OF KILLING YOURSELF?: NO

## 2024-09-19 ASSESSMENT — ENCOUNTER SYMPTOMS
FEVER: 0
SHORTNESS OF BREATH: 0
HEADACHES: 0
SORE THROAT: 0
NAUSEA: 0
ABDOMINAL DISTENTION: 0
ABDOMINAL PAIN: 0
CHILLS: 0
ARTHRALGIAS: 0
COLOR CHANGE: 0
BACK PAIN: 0
WOUND: 0
JOINT SWELLING: 0
AGITATION: 0
DIZZINESS: 0
DYSURIA: 0
LIGHT-HEADEDNESS: 0
TROUBLE SWALLOWING: 0

## 2024-09-19 ASSESSMENT — PAIN - FUNCTIONAL ASSESSMENT
PAIN_FUNCTIONAL_ASSESSMENT: 0-10
PAIN_FUNCTIONAL_ASSESSMENT: 0-10
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
## 2024-09-19 NOTE — ANESTHESIA POSTPROCEDURE EVALUATION
"Patient: Vanessa Son \"Jodie\"    Procedure Summary       Date: 09/19/24 Room / Location: [facility]    Anesthesia Start: 0805 Anesthesia Stop: 0857    Procedure: COLONOSCOPY Diagnosis: Colovaginal fistula    Scheduled Providers: Haim Argueta MD Responsible Provider: Balta Albrecht MD    Anesthesia Type: MAC ASA Status: 2            Anesthesia Type: MAC    Vitals Value Taken Time   /72 09/19/24 0857   Temp 36.4 09/19/24 0857   Pulse 72 09/19/24 0857   Resp 18 09/19/24 0857   SpO2 99 09/19/24 0857       Anesthesia Post Evaluation    Patient location during evaluation: PACU  Patient participation: complete - patient participated  Level of consciousness: awake  Pain management: adequate  Airway patency: patent  Cardiovascular status: acceptable  Respiratory status: acceptable and room air  Hydration status: acceptable  Postoperative Nausea and Vomiting: none      No notable events documented.    "
comfortable appearance

## 2024-09-19 NOTE — ANESTHESIA PREPROCEDURE EVALUATION
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)   (+) Class 3 severe obesity due to excess calories with serious comorbidity and body mass index (BMI) of 40.0 to 44.9 in adult (Multi)   (+) Type 2 diabetes mellitus with hyperglycemia, with long-term current use of insulin (Multi)      Musculoskeletal   (+) Primary osteoarthritis of right knee       Clinical information reviewed:    Allergies  Meds               NPO Detail:  NPO/Void Status  Date of Last Liquid: 09/18/24  Time of Last Liquid: 2200  Date of Last Solid: 09/18/24  Time of Last Solid: 0930         Physical Exam    Airway  Mallampati: II  TM distance: >3 FB  Neck ROM: full     Cardiovascular   Rhythm: regular     Dental - normal exam     Pulmonary    Abdominal            Anesthesia Plan    History of general anesthesia?: yes  History of complications of general anesthesia?: no    ASA 2     MAC     intravenous induction   Anesthetic plan and risks discussed with patient.      "

## 2024-09-25 LAB
LABORATORY COMMENT REPORT: NORMAL
PATH REPORT.FINAL DX SPEC: NORMAL
PATH REPORT.GROSS SPEC: NORMAL
PATH REPORT.RELEVANT HX SPEC: NORMAL
PATH REPORT.TOTAL CANCER: NORMAL

## 2024-10-09 ENCOUNTER — OFFICE VISIT (OUTPATIENT)
Dept: PRIMARY CARE | Facility: CLINIC | Age: 74
End: 2024-10-09
Payer: MEDICARE

## 2024-10-09 VITALS
HEIGHT: 65 IN | WEIGHT: 228 LBS | TEMPERATURE: 97.3 F | RESPIRATION RATE: 16 BRPM | DIASTOLIC BLOOD PRESSURE: 62 MMHG | HEART RATE: 70 BPM | BODY MASS INDEX: 37.99 KG/M2 | OXYGEN SATURATION: 98 % | SYSTOLIC BLOOD PRESSURE: 108 MMHG

## 2024-10-09 DIAGNOSIS — L90.0 LICHEN SCLEROSUS: ICD-10-CM

## 2024-10-09 DIAGNOSIS — I10 ESSENTIAL HYPERTENSION: ICD-10-CM

## 2024-10-09 DIAGNOSIS — N18.1 CKD STAGE 1 DUE TO TYPE 2 DIABETES MELLITUS (MULTI): ICD-10-CM

## 2024-10-09 DIAGNOSIS — E11.22 CKD STAGE 1 DUE TO TYPE 2 DIABETES MELLITUS (MULTI): ICD-10-CM

## 2024-10-09 DIAGNOSIS — E11.65 TYPE 2 DIABETES MELLITUS WITH HYPERGLYCEMIA, WITH LONG-TERM CURRENT USE OF INSULIN: ICD-10-CM

## 2024-10-09 DIAGNOSIS — E78.2 MIXED HYPERLIPIDEMIA: ICD-10-CM

## 2024-10-09 DIAGNOSIS — N89.8 VAGINAL LESION: ICD-10-CM

## 2024-10-09 DIAGNOSIS — Z79.4 TYPE 2 DIABETES MELLITUS WITH HYPERGLYCEMIA, WITH LONG-TERM CURRENT USE OF INSULIN: ICD-10-CM

## 2024-10-09 PROCEDURE — 99214 OFFICE O/P EST MOD 30 MIN: CPT | Performed by: FAMILY MEDICINE

## 2024-10-09 PROCEDURE — 1036F TOBACCO NON-USER: CPT | Performed by: FAMILY MEDICINE

## 2024-10-09 PROCEDURE — 3044F HG A1C LEVEL LT 7.0%: CPT | Performed by: FAMILY MEDICINE

## 2024-10-09 PROCEDURE — 1159F MED LIST DOCD IN RCRD: CPT | Performed by: FAMILY MEDICINE

## 2024-10-09 PROCEDURE — 3078F DIAST BP <80 MM HG: CPT | Performed by: FAMILY MEDICINE

## 2024-10-09 PROCEDURE — 3074F SYST BP LT 130 MM HG: CPT | Performed by: FAMILY MEDICINE

## 2024-10-09 PROCEDURE — 3048F LDL-C <100 MG/DL: CPT | Performed by: FAMILY MEDICINE

## 2024-10-09 PROCEDURE — 3008F BODY MASS INDEX DOCD: CPT | Performed by: FAMILY MEDICINE

## 2024-10-09 ASSESSMENT — ENCOUNTER SYMPTOMS
SWEATS: 0
NAUSEA: 0
FLANK PAIN: 0
DYSURIA: 1
VOMITING: 0

## 2024-10-09 NOTE — PROGRESS NOTES
Covid vax: x 2  Flu: declined  Pneumo: UTD  RSV: advised  Shingles: advised    CRC: 2019  Mammogram: declines  Pap: hysterectomy  Lmp: hysterectomy

## 2024-10-09 NOTE — PROGRESS NOTES
"Subjective   Patient ID: Vanessa Son \"Valerie" is a 74 y.o. female who presents for Vaginal Pain (Lump-growing, bleeds at times, no pus/).  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)    Essential hypertension    Gout    Hyperuricemia    Hematuria    Lichen sclerosus    Primary osteoarthritis of right knee    Status post total knee replacement    Class 3 severe obesity due to excess calories with serious comorbidity and body mass index (BMI) of 40.0 to 44.9 in adult    Type 2 diabetes mellitus with hyperglycemia, with long-term current use of insulin    Mixed hyperlipidemia    Colovaginal fistula       Past Surgical History:   Procedure Laterality Date    APPENDECTOMY  1963    CATARACT EXTRACTION  2018    CHOLECYSTECTOMY  1974    COLON SURGERY  05/2024    colovaginal fistula repair    COLONOSCOPY  2019    Incomplete so Barium Enema + tics only    HYSTERECTOMY  1991    uterine CA limited no rad tx nor chemo    JOINT REPLACEMENT      TONSILLECTOMY  1991    TOTAL KNEE ARTHROPLASTY Right 10/2021    TUBAL LIGATION  1977       Review of Systems  This patient has  NO history of seizures/ CAD or CVA    NO history of recent Covid nor flu symptoms,  NO Fever nor chills,  NO Chest pain, shortness of breath nor paroxysmal nocturnal dyspnea,  NO Nausea, vomiting, nor diarrhea,  NO Hematochezia nor melena,  NO Dysuria, hematuria, nor new incontinence issues  NO new severe headaches nor neurological complaints,  NO new issues with anxiety nor depression nor new psychiatric complaints,  NO suicidal nor homicidal ideations.     OBJECTIVE:  /62   Pulse 70   Temp 36.3 °C (97.3 °F) (Temporal)   Resp 16   Ht 1.651 m (5' 5\")   Wt 103 kg (228 lb)   LMP  (LMP Unknown)   SpO2 98%   BMI 37.94 kg/m²      General:  alert, oriented, no acute distress.  No obvious skin rashes noted.   No gait disturbance " noted.    Mood is pleasant,  no signs of emotional distress.   Not appearing intoxicated or altered.   No voiced delusions,   Normal, appropriate behavior.    HEENT: Normocephalic, atraumatic,   Pupils round, reactive to light  Extraocular motions intact and wnl  Tympanic membranes normal    Neck: no nuchal rigidity  No masses palpable.  No carotid bruits.  No thyromegaly.    Respiratory: Equal breath sounds  No wheezes,    rales,    nor rhonchi  No respiratory distress.    Heart: Regular rate and rhythm, no    murmurs  no rubs/gallops    Abdomen: no masses palpable, nontender, no rebound nor guarding.  overwt  Extremities: NO cyanosis noted, no clubbing.   No edema noted.  2+dorsalis pedis pulses.    Normal-not antalgic, steady gait.  L vagina 2cm lesion red, vascular ?tumor/mass     Hospital Outpatient Visit on 09/19/2024   Component Date Value Ref Range Status    POCT Glucose 09/19/2024 141 (H)  74 - 99 mg/dL Final    Case Report 09/19/2024    Final                    Value:Surgical Pathology                                Case: [id number]                                  Authorizing Provider:  Haim Argueta MD          Collected:           09/19/2024 0843              Ordering Location:     [facility] [place] Received:            09/19/2024 0937              Pathologist:           Elliot Parra MD                                                                  Specimen:    COLON - DESCENDING POLYP                                                                   FINAL DIAGNOSIS 09/19/2024    Final                    Value:A. DESCENDING COLON POLYP:      -- TUBULAR ADENOMA         09/19/2024    Final                    Value:By the signature on this report, the individual or group listed as making the Final Interpretation/Diagnosis certifies that they have reviewed this case.       Clinical History 09/19/2024    Final                    Value:Colovaginal fistula [N82.4]       Gross Description 09/19/2024     "Final                    Value:A: Received in formalin, labeled with the patient's name and hospital number and \"descending colon polyp\", is one fragment of tan, soft tissue measuring 0.7 x 0.4 x 0.3 cm. The specimen is submitted in toto in one cassette.  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

## 2024-10-11 ENCOUNTER — OFFICE VISIT (OUTPATIENT)
Dept: OBSTETRICS AND GYNECOLOGY | Facility: CLINIC | Age: 74
End: 2024-10-11
Payer: MEDICARE

## 2024-10-11 VITALS
WEIGHT: 226.6 LBS | BODY MASS INDEX: 37.75 KG/M2 | SYSTOLIC BLOOD PRESSURE: 144 MMHG | HEIGHT: 65 IN | DIASTOLIC BLOOD PRESSURE: 76 MMHG

## 2024-10-11 DIAGNOSIS — E11.65 TYPE 2 DIABETES MELLITUS WITH HYPERGLYCEMIA, WITH LONG-TERM CURRENT USE OF INSULIN: ICD-10-CM

## 2024-10-11 DIAGNOSIS — N90.89 VULVAR LESION: Primary | ICD-10-CM

## 2024-10-11 DIAGNOSIS — Z79.4 TYPE 2 DIABETES MELLITUS WITH HYPERGLYCEMIA, WITH LONG-TERM CURRENT USE OF INSULIN: ICD-10-CM

## 2024-10-11 PROCEDURE — 1159F MED LIST DOCD IN RCRD: CPT | Performed by: OBSTETRICS & GYNECOLOGY

## 2024-10-11 PROCEDURE — 3048F LDL-C <100 MG/DL: CPT | Performed by: OBSTETRICS & GYNECOLOGY

## 2024-10-11 PROCEDURE — 88305 TISSUE EXAM BY PATHOLOGIST: CPT | Performed by: PATHOLOGY

## 2024-10-11 PROCEDURE — 56605 BIOPSY OF VULVA/PERINEUM: CPT | Performed by: OBSTETRICS & GYNECOLOGY

## 2024-10-11 PROCEDURE — 3008F BODY MASS INDEX DOCD: CPT | Performed by: OBSTETRICS & GYNECOLOGY

## 2024-10-11 PROCEDURE — 3077F SYST BP >= 140 MM HG: CPT | Performed by: OBSTETRICS & GYNECOLOGY

## 2024-10-11 PROCEDURE — 3044F HG A1C LEVEL LT 7.0%: CPT | Performed by: OBSTETRICS & GYNECOLOGY

## 2024-10-11 PROCEDURE — 99203 OFFICE O/P NEW LOW 30 MIN: CPT | Performed by: OBSTETRICS & GYNECOLOGY

## 2024-10-11 PROCEDURE — 3078F DIAST BP <80 MM HG: CPT | Performed by: OBSTETRICS & GYNECOLOGY

## 2024-10-11 PROCEDURE — 88305 TISSUE EXAM BY PATHOLOGIST: CPT

## 2024-10-11 RX ORDER — LIDOCAINE HYDROCHLORIDE 20 MG/ML
2 INJECTION, SOLUTION EPIDURAL; INFILTRATION; INTRACAUDAL; PERINEURAL ONCE
Status: COMPLETED | OUTPATIENT
Start: 2024-10-11 | End: 2024-10-11

## 2024-10-11 RX ORDER — CLOBETASOL PROPIONATE 0.5 MG/G
OINTMENT TOPICAL 2 TIMES DAILY
Qty: 60 G | Refills: 2 | Status: SHIPPED | OUTPATIENT
Start: 2024-10-11

## 2024-10-11 NOTE — PROGRESS NOTES
"Pt is here for vaginal lesion.  Subjective   Patient ID: Vanessa Son \"Valerie" is a 74 y.o. female who presents for vaginal lesion.  HPI  Pt presents due to vulvar lesions.  She has had these lesions for almost 1 year.     Review of Systems  all other symptoms were found to be neg except for HPI/CC.      Objective   Physical Exam  General  General Appearance - normal build and Well groomed, Not in acute distress, No acute respiratory distress.  Mental Status - Alert.    Integumentary  - - warm and dry with no rashes.    Head and Neck  - - normalocephalic.    Eye  - - Bilateral - pupils equal and round and sclera clear.    Chest and Lung Exam  - - Bilateral - normal breathing effort.    Female Genitourinary  - - vulva:  left side had 2cm lesion that is vascular and raised, has slight discharge that is pink.  Right side had 2 1-3cm circular areas that were rough in appearance, slightly raise and not as vascular.  Area of white patches seen.    Musculoskeletal  - - normal posture and normal gait and station.      Assessment/Plan   Diagnoses and all orders for this visit:  Vulvar lesion  -     Referral to Gynecologic Oncology; Future  -     clobetasol (Temovate) 0.05 % ointment; Apply topically 2 times a day.  -     Surgical Pathology Exam  -     lidocaine PF (Xylocaine) 20 mg/mL (2 %) injection 40 mg  Other orders  -     Biopsy vulva         Thu Robbins DO 10/11/24 2:56 PM   Patient ID: Vanessa England" is a 74 y.o. female.    Biopsy vulva    Date/Time: 10/12/2024 8:51 PM    Performed by: Thu Robbins DO  Authorized by: Thu Robbins DO    Procedure Overview:     Procedure type: punch biopsy of skin      # of biopsies taken:  1  Consent:     Consent obtained:  Written    Consent given by:  Patient    Risks & benefits discussed with patient: Yes    Indication:     Indications: lesion    Pre-procedure Details:     Local anesthetic:  Lidocaine 1% w/o epi    Total amount used (mL):  2  Procedure Details:     " Location 1: labia majora (R)      Size (mm):  3    Hemostasis: pressure and silver nitrate    Post-procedure Details:     Patient tolerance of procedure:  Tolerated well, no immediate complications    Specimen sent to pathology: Yes      Educational handouts and instructions given to patient as appropriate: Yes    Findings:     Impression: vulvar cancer      Discussed with Pt and will refer to gyn onc  Sent biopsy to path, will call when gets results.  Pt is to call with any questions of concerns.

## 2024-10-14 RX ORDER — BLOOD SUGAR DIAGNOSTIC
STRIP MISCELLANEOUS
Qty: 300 STRIP | Refills: 3 | Status: SHIPPED | OUTPATIENT
Start: 2024-10-14

## 2024-10-14 RX ORDER — LIRAGLUTIDE 6 MG/ML
1.8 INJECTION SUBCUTANEOUS DAILY
Qty: 27 ML | Refills: 3 | Status: SHIPPED | OUTPATIENT
Start: 2024-10-14

## 2024-10-21 DIAGNOSIS — L90.0 LICHEN SCLEROSUS: Primary | ICD-10-CM

## 2024-10-21 RX ORDER — LIDOCAINE 50 MG/G
OINTMENT TOPICAL 2 TIMES DAILY
Qty: 30 G | Refills: 1 | Status: SHIPPED | OUTPATIENT
Start: 2024-10-21 | End: 2025-10-21

## 2024-10-30 ENCOUNTER — OFFICE VISIT (OUTPATIENT)
Dept: GYNECOLOGIC ONCOLOGY | Facility: CLINIC | Age: 74
End: 2024-10-30
Payer: MEDICARE

## 2024-10-30 VITALS
TEMPERATURE: 96.8 F | HEART RATE: 75 BPM | RESPIRATION RATE: 16 BRPM | BODY MASS INDEX: 38.28 KG/M2 | HEIGHT: 64 IN | OXYGEN SATURATION: 98 % | WEIGHT: 224.21 LBS | DIASTOLIC BLOOD PRESSURE: 75 MMHG | SYSTOLIC BLOOD PRESSURE: 143 MMHG

## 2024-10-30 DIAGNOSIS — N90.89 VULVAR LESION: ICD-10-CM

## 2024-10-30 DIAGNOSIS — L90.0 LICHEN SCLEROSUS: Primary | ICD-10-CM

## 2024-10-30 PROCEDURE — 99215 OFFICE O/P EST HI 40 MIN: CPT | Performed by: STUDENT IN AN ORGANIZED HEALTH CARE EDUCATION/TRAINING PROGRAM

## 2024-10-30 ASSESSMENT — PAIN SCALES - GENERAL: PAINLEVEL_OUTOF10: 0-NO PAIN

## 2024-11-06 LAB
LAB AP ASR DISCLAIMER: NORMAL
LABORATORY COMMENT REPORT: NORMAL
PATH REPORT.COMMENTS IMP SPEC: NORMAL
PATH REPORT.FINAL DX SPEC: NORMAL
PATH REPORT.GROSS SPEC: NORMAL
PATH REPORT.RELEVANT HX SPEC: NORMAL
PATH REPORT.TOTAL CANCER: NORMAL

## 2024-11-09 ENCOUNTER — TELEPHONE (OUTPATIENT)
Dept: GYNECOLOGIC ONCOLOGY | Facility: HOSPITAL | Age: 74
End: 2024-11-09
Payer: MEDICARE

## 2024-11-09 ENCOUNTER — PREP FOR PROCEDURE (OUTPATIENT)
Dept: OPERATING ROOM | Facility: HOSPITAL | Age: 74
End: 2024-11-09
Payer: MEDICARE

## 2024-11-09 DIAGNOSIS — L92.9 GRANULATION TISSUE: ICD-10-CM

## 2024-11-09 DIAGNOSIS — L90.0 LICHEN SCLEROSUS: Primary | ICD-10-CM

## 2024-11-09 DIAGNOSIS — N90.4 LICHEN SCLEROSUS OF VULVA: ICD-10-CM

## 2024-11-09 RX ORDER — ACETAMINOPHEN 325 MG/1
975 TABLET ORAL ONCE
OUTPATIENT
Start: 2024-11-09 | End: 2024-11-09

## 2024-11-09 NOTE — TELEPHONE ENCOUNTER
Results reviewed. No cancer. Plan for excision with WLE. Consent to be signed DOS.     Adina Navarro MD MPH

## 2024-11-09 NOTE — PROGRESS NOTES
Called patient to review results. Healing well. Bleeding is minimal. Pain is ongoing though manageable. Voiding without difficulty. Pathology reviewed. Recommend WLE for symptom management and rule out cancer. No cancer on initial pathology review. Consent to be signed DOS.     Adina Navarro MD MPH

## 2024-11-11 PROBLEM — L92.9 GRANULATION TISSUE: Status: ACTIVE | Noted: 2024-11-09

## 2024-11-13 ENCOUNTER — APPOINTMENT (OUTPATIENT)
Dept: GYNECOLOGIC ONCOLOGY | Facility: CLINIC | Age: 74
End: 2024-11-13
Payer: MEDICARE

## 2024-11-13 ENCOUNTER — APPOINTMENT (OUTPATIENT)
Dept: PREADMISSION TESTING | Facility: HOSPITAL | Age: 74
End: 2024-11-13
Payer: MEDICARE

## 2024-11-13 ASSESSMENT — ENCOUNTER SYMPTOMS
SINUS CONGESTION: 1
NECK STIFFNESS: 1
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
## 2024-11-14 NOTE — HOSPITAL COURSE
"[ ] PAT  [ ] Plan for overnight obs?   [ ] Consent prepped and signed  [X] Preop meds  [X] Add to list     Gynecologic Oncology Surgery History and Physical    Subjective   Vanessa Son \"Valerie" is a 74 y.o. with benign vulvar lesion presenting for WLE.    OBGYN Hx:  - ;  x 2  - Menarche at 13; Menopause at 33 (time of hysterectomy; denies HRT use for extended period of time  - Has previously used OCPs for ~1-2 years  - Last Pap 20+ years normal     Screening:  - Last Mammogram: , declines further  - Last Colonoscopy: polyp removed, repeat 5 years     Social Hx:  Vanessa Son  reports that she quit smoking about 46 years ago. Her smoking use included cigarettes. She has been exposed to tobacco smoke. She has never used smokeless tobacco.  She  reports that she does not currently use alcohol.  She  reports no history of drug use.  Lives at home with . Retired, previously worked as .     Obstetrical History   OB History    Para Term  AB Living   2 2           SAB IAB Ectopic Multiple Live Births                  # Outcome Date GA Lbr Zack/2nd Weight Sex Type Anes PTL Lv   2 Para            1 Para                Past Medical History  Past Medical History:   Diagnosis Date    Adverse effect of anesthesia     Memory loss with anesthesia. States that she will forget certain names or words and this memory never returns.    Anxiety     CKD (chronic kidney disease)     stage 1    Colovaginal fistula     EUA flex/sig on 24    Delayed emergence from general anesthesia     Depression     takes Richey Wort oral suppletments    GERD (gastroesophageal reflux disease)     History of blood transfusion     HLD (hyperlipidemia)     HTN (hypertension)     Hx of mammogram 2017    cat 2--declines further    Lichen sclerosus     Lumbar disc disease     Mammogram declined     PONV (postoperative nausea and vomiting)     Sleep apnea     NOT ON CPAP    Type 2 diabetes mellitus     " "A1C was 6.1 on 24    Uterine cancer (Multi)     Vaginal lesion         Past Surgical History   Past Surgical History:   Procedure Laterality Date    APPENDECTOMY  1963    CATARACT EXTRACTION  2018    CHOLECYSTECTOMY  1974    COLON SURGERY  2024    colovaginal fistula repair    COLONOSCOPY  2024    adenoma-due     HYSTERECTOMY  1991    uterine CA limited no rad tx nor chemo    JOINT REPLACEMENT      TONSILLECTOMY  1991    TOTAL KNEE ARTHROPLASTY Right 10/2021    TUBAL LIGATION         Social History  Social History     Tobacco Use    Smoking status: Former     Current packs/day: 0.00     Types: Cigarettes     Quit date:      Years since quittin.9     Passive exposure: Past    Smokeless tobacco: Never   Substance Use Topics    Alcohol use: Not Currently     Substance and Sexual Activity   Drug Use Never       Allergies  Codeine, Norgestrel-ethinyl estradiol, Atorvastatin, Enalapril, Hydroxyzine, Simvastatin, and Terazosin     Medications  No medications prior to admission.       ROS: negative except per HPI    Objective    Last Vitals  To be obtained. Will fu    Physical Examination  General: no acute distress  HEENT: normocephalic, atraumatic  Heart: warm and well perfused  Lungs: breathing comfortably on room air  Abdomen: nondistended  Extremities: moving all extremities  Neuro: awake and conversant  Psych: appropriate mood and affect    Lab Review          Lab Results   Component Value Date    WBC 4.5 2024    HGB 12.5 2024    HCT 37.3 2024    MCV 91 2024     2024       Lab Results   Component Value Date    GLUCOSE 126 (H) 2024    CALCIUM 9.4 2024     2024    K 4.0 2024    CO2 30 2024     2024    BUN 27 (H) 2024    CREATININE 0.79 2024       Assessment/Plan     Vanessa Son \"Jodie\" is a 74 y.o. with benign vulvar lesion presenting for scheduled surgery.    Plan to proceed with wide local " excision  Surgical consent was reviewed. The risks of surgery were discussed including: bleeding (including need for blood transfusion in life-threatening situations; risks of transfusion), infection, damage to surrounding organs. The patient had the opportunity to answer questions and desired to proceed with surgery following our discussion. Both verbal and written consents were obtained.    To be d/w Dr. Melanie Anthony MD PGY1  Gynecology Oncology, Pager [phone]

## 2024-11-15 ENCOUNTER — LAB (OUTPATIENT)
Dept: LAB | Facility: LAB | Age: 74
End: 2024-11-15
Payer: MEDICARE

## 2024-11-15 ENCOUNTER — PRE-ADMISSION TESTING (OUTPATIENT)
Dept: PREADMISSION TESTING | Facility: HOSPITAL | Age: 74
End: 2024-11-15
Payer: MEDICARE

## 2024-11-15 ENCOUNTER — LAB REQUISITION (OUTPATIENT)
Dept: LAB | Facility: HOSPITAL | Age: 74
End: 2024-11-15
Payer: MEDICARE

## 2024-11-15 ENCOUNTER — DOCUMENTATION (OUTPATIENT)
Dept: PREADMISSION TESTING | Facility: HOSPITAL | Age: 74
End: 2024-11-15

## 2024-11-15 VITALS
WEIGHT: 218.26 LBS | SYSTOLIC BLOOD PRESSURE: 127 MMHG | RESPIRATION RATE: 18 BRPM | HEIGHT: 64 IN | HEART RATE: 73 BPM | BODY MASS INDEX: 37.26 KG/M2 | TEMPERATURE: 97.4 F | DIASTOLIC BLOOD PRESSURE: 63 MMHG | OXYGEN SATURATION: 98 %

## 2024-11-15 DIAGNOSIS — L90.0 LICHEN SCLEROSUS ET ATROPHICUS: ICD-10-CM

## 2024-11-15 DIAGNOSIS — L90.0 LICHEN SCLEROSUS: ICD-10-CM

## 2024-11-15 DIAGNOSIS — Z01.818 PREOP TESTING: Primary | ICD-10-CM

## 2024-11-15 DIAGNOSIS — N90.4 LICHEN SCLEROSUS OF VULVA: ICD-10-CM

## 2024-11-15 DIAGNOSIS — Z01.818 PREOP TESTING: ICD-10-CM

## 2024-11-15 DIAGNOSIS — R73.9 ELEVATED BLOOD SUGAR: ICD-10-CM

## 2024-11-15 LAB
ABO GROUP (TYPE) IN BLOOD: NORMAL
ANION GAP SERPL CALC-SCNC: 9 MMOL/L (ref 10–20)
ANTIBODY SCREEN: NORMAL
ATRIAL RATE: 73 BPM
BUN SERPL-MCNC: 28 MG/DL (ref 6–23)
CALCIUM SERPL-MCNC: 9.2 MG/DL (ref 8.6–10.3)
CHLORIDE SERPL-SCNC: 100 MMOL/L (ref 98–107)
CO2 SERPL-SCNC: 31 MMOL/L (ref 21–32)
CREAT SERPL-MCNC: 0.93 MG/DL (ref 0.5–1.05)
EGFRCR SERPLBLD CKD-EPI 2021: 65 ML/MIN/1.73M*2
ERYTHROCYTE [DISTWIDTH] IN BLOOD BY AUTOMATED COUNT: 14.1 % (ref 11.5–14.5)
EST. AVERAGE GLUCOSE BLD GHB EST-MCNC: 157 MG/DL
GLUCOSE SERPL-MCNC: 115 MG/DL (ref 74–99)
HBA1C MFR BLD: 7.1 %
HCT VFR BLD AUTO: 41.4 % (ref 36–46)
HGB BLD-MCNC: 14 G/DL (ref 12–16)
MCH RBC QN AUTO: 30 PG (ref 26–34)
MCHC RBC AUTO-ENTMCNC: 33.8 G/DL (ref 32–36)
MCV RBC AUTO: 89 FL (ref 80–100)
NRBC BLD-RTO: 0 /100 WBCS (ref 0–0)
P AXIS: 77 DEGREES
P OFFSET: 178 MS
P ONSET: 126 MS
PLATELET # BLD AUTO: 246 X10*3/UL (ref 150–450)
POTASSIUM SERPL-SCNC: 4.2 MMOL/L (ref 3.5–5.3)
PR INTERVAL: 176 MS
Q ONSET: 214 MS
QRS COUNT: 12 BEATS
QRS DURATION: 126 MS
QT INTERVAL: 430 MS
QTC CALCULATION(BAZETT): 473 MS
QTC FREDERICIA: 459 MS
R AXIS: -41 DEGREES
RBC # BLD AUTO: 4.66 X10*6/UL (ref 4–5.2)
RH FACTOR (ANTIGEN D): NORMAL
SODIUM SERPL-SCNC: 136 MMOL/L (ref 136–145)
T AXIS: -35 DEGREES
T OFFSET: 429 MS
VENTRICULAR RATE: 73 BPM
WBC # BLD AUTO: 8.2 X10*3/UL (ref 4.4–11.3)

## 2024-11-15 PROCEDURE — 99204 OFFICE O/P NEW MOD 45 MIN: CPT | Performed by: PHYSICIAN ASSISTANT

## 2024-11-15 PROCEDURE — 83036 HEMOGLOBIN GLYCOSYLATED A1C: CPT

## 2024-11-15 PROCEDURE — 86901 BLOOD TYPING SEROLOGIC RH(D): CPT

## 2024-11-15 PROCEDURE — 86900 BLOOD TYPING SEROLOGIC ABO: CPT

## 2024-11-15 PROCEDURE — 86850 RBC ANTIBODY SCREEN: CPT

## 2024-11-15 PROCEDURE — 36415 COLL VENOUS BLD VENIPUNCTURE: CPT

## 2024-11-15 PROCEDURE — 85027 COMPLETE CBC AUTOMATED: CPT

## 2024-11-15 PROCEDURE — 93010 ELECTROCARDIOGRAM REPORT: CPT | Performed by: INTERNAL MEDICINE

## 2024-11-15 PROCEDURE — 80048 BASIC METABOLIC PNL TOTAL CA: CPT

## 2024-11-15 PROCEDURE — 93005 ELECTROCARDIOGRAM TRACING: CPT | Performed by: PHYSICIAN ASSISTANT

## 2024-11-15 ASSESSMENT — ENCOUNTER SYMPTOMS
HEMATOLOGIC/LYMPHATIC NEGATIVE: 1
NEUROLOGICAL NEGATIVE: 1
EYES NEGATIVE: 1
ENDOCRINE NEGATIVE: 1
RESPIRATORY NEGATIVE: 1
CARDIOVASCULAR NEGATIVE: 1
MUSCULOSKELETAL NEGATIVE: 1
GASTROINTESTINAL NEGATIVE: 1
CONSTITUTIONAL NEGATIVE: 1
PSYCHIATRIC NEGATIVE: 1
ALLERGIC/IMMUNOLOGIC NEGATIVE: 1
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
## 2024-11-15 NOTE — PREPROCEDURE INSTRUCTIONS
Medication List            Accurate as of November 15, 2024  1:07 PM. Always use your most recent med list.                acetaminophen 325 mg tablet  Commonly known as: Tylenol  Take 2 tablets (650 mg) by mouth every 6 hours if needed for mild pain (1 - 3).  Notes to patient: Use if needed morning of surgery     amLODIPine 5 mg tablet  Commonly known as: Norvasc  Take 1 tablet (5 mg) by mouth once daily.  Medication Adjustments for Surgery: Take on the morning of surgery     aspirin 81 mg EC tablet  Medication Adjustments for Surgery: Take on the morning of surgery     cholecalciferol 5,000 Units tablet  Commonly known as: Vitamin D-3  Medication Adjustments for Surgery: Do Not take on the morning of surgery     clobetasol 0.05 % ointment  Commonly known as: Temovate  Apply topically 2 times a day.  Medication Adjustments for Surgery: Do Not take on the morning of surgery     colchicine 0.6 mg tablet  TAKE 1 TABLET BY MOUTH DAILY AS  NEEDED FOR GOUT  Medication Adjustments for Surgery: Take/Use as prescribed     hydrocortisone 1 % cream  Apply topically 2 times a day.  Medication Adjustments for Surgery: Do Not take on the morning of surgery     indapamide 1.25 mg tablet  Commonly known as: Lozol  Take 1 tablet (1.25 mg) by mouth once daily in the evening.  Medication Adjustments for Surgery: Take on the morning of surgery     insulin lispro 100 unit/mL injection  Commonly known as: HumaLOG KwikPen Insulin  Inject 12 Units under the skin 3 times a day with meals.  Medication Adjustments for Surgery: Do Not take on the morning of surgery     KELP ORAL  Notes to patient: HOD 7 Days prior to surgery - LD 11/14     lancets 33 gauge misc  Test 3 times daily     Lantus Solostar U-100 Insulin 100 unit/mL (3 mL) pen  Generic drug: insulin glargine  INJECT SUBCUTANEOUSLY 100 UNITS  DAILY  Medication Adjustments for Surgery: Do Not take on the morning of surgery     lidocaine 5 % ointment  Commonly known as:  Xylocaine  Apply topically 2 times a day.  Medication Adjustments for Surgery: Do Not take on the morning of surgery     losartan-hydrochlorothiazide 50-12.5 mg tablet  Commonly known as: Hyzaar  Take 0.5 tablets by mouth once daily in the evening.  Medication Adjustments for Surgery: Take last dose 1 day (24 hours) before surgery     magnesium 250 mg tablet  Medication Adjustments for Surgery: Do Not take on the morning of surgery     metoprolol tartrate 100 mg tablet  Commonly known as: Lopressor  Take 1 tablet (100 mg) by mouth 2 times a day.  Medication Adjustments for Surgery: Take on the morning of surgery     omega 3-dha-epa-fish oil 450 mg (128 mg- 322 mg)-650 mg capsule,delayed release(/EC)  Notes to patient: HOLD 7 Days prior to surgery - LD 11/13     OneTouch Ultra Test strip  Generic drug: blood sugar diagnostic  TEST 3 TIMES DAILY     potassium gluconate 595 mg (99 mg) tablet  Medication Adjustments for Surgery: Do Not take on the morning of surgery     Kelsi's wort 300 mg capsule  Notes to patient: HOLD 7 Days prior to surgery - LD 11/13     Victoza 3-Abiel 0.6 mg/0.1 mL (18 mg/3 mL) injection  Generic drug: liraglutide  INJECT SUBCUTANEOUSLY 1.8 MG  DAILY  Medication Adjustments for Surgery: Do Not take on the morning of surgery                 Concerning above medication instructions - If medication is normally taken at night continue normal schedule - do not take night prior and morning of surgery.     CONTACT SURGEON'S OFFICE IF YOU DEVELOP:  * Fever = 100.4 F   * New respiratory symptoms (e.g. cough, shortness of breath, respiratory distress, sore throat)  * Recent loss of taste or smell  *Flu like symptoms such as headache, fatigue or gastrointestinal symptoms  * You develop any open sores, shingles, burning or painful urination   AND/OR:  * You no longer wish to have the surgery.  * Any other personal circumstances change that may lead to the need to cancel or defer this surgery.  *You were  admitted to any hospital within one week of your planned procedure.    SMOKING:  *Quitting smoking can make a huge difference to your health and recovery from surgery.    *If you need help with quitting, call 8-225-QUIT-NOW.    THE DAY OF SURGERY:  *Do not eat any food after midnight the night before surgery/procedure.   *YOU MUST DRINK 14 oz drink clear liquids (i.e. water, black coffee/tea, (no milk or cream) apple juice, and electrolyte drinks (Gatorade)  2 hours before your instructed arrival time to the hospital.  *You may chew gum until  2 hours before your surgery/procedure.    SURGICAL TIME  *You will be contacted between 2 p.m. and 6 p.m. the business day before your surgery with your arrival time.  *If you haven't received a call by 6pm, call [phone].  *Scheduled surgery times may change and you will be notified if this occurs-check your personal voicemail for any updates.    ON THE MORNING OF SURGERY:  *Wear comfortable, loose fitting clothing.   *Do not use moisturizers, creams, lotions or perfume.  *All jewelry and valuables should be left at home.  *Prosthetic devices such as contact lenses, hearing aids, dentures, eyelash extensions, hairpins and body piercing must be removed before surgery.    BRING WITH YOU:  *Photo ID and insurance card  *Current list of medicines and allergies  *Pacemaker/Defibrillator/Heart stent cards  *CPAP machine and mask  *Slings/splints/crutches  *Copy of your complete Advanced Directive/DHPOA-if applicable  *Neurostimulator implant remote    PARKING AND ARRIVAL:  *Check in at the Main Entrance desk and let them know you are here for surgery.  *You will be directed to the 2nd floor surgical waiting area.    AFTER OUTPATIENT SURGERY:  *A responsible adult MUST accompany you at the time of discharge and stay with you for 24 hours after your surgery.  *You may NOT drive yourself home after surgery.  *You may use a taxi or ride sharing service (Lyft, Uber) to return home  ONLY if you are accompanied by a friend or family member.  *Instructions for resuming your medications will be provided by your surgeon.

## 2024-11-22 ENCOUNTER — ANESTHESIA EVENT (OUTPATIENT)
Dept: OPERATING ROOM | Facility: HOSPITAL | Age: 74
End: 2024-11-22
Payer: MEDICARE

## 2024-11-22 ENCOUNTER — ANESTHESIA (OUTPATIENT)
Dept: OPERATING ROOM | Facility: HOSPITAL | Age: 74
End: 2024-11-22
Payer: MEDICARE

## 2024-11-22 ENCOUNTER — HOSPITAL ENCOUNTER (OUTPATIENT)
Facility: HOSPITAL | Age: 74
Setting detail: OUTPATIENT SURGERY
Discharge: HOME | End: 2024-11-22
Attending: STUDENT IN AN ORGANIZED HEALTH CARE EDUCATION/TRAINING PROGRAM | Admitting: STUDENT IN AN ORGANIZED HEALTH CARE EDUCATION/TRAINING PROGRAM
Payer: MEDICARE

## 2024-11-22 VITALS
BODY MASS INDEX: 38.06 KG/M2 | TEMPERATURE: 96.8 F | RESPIRATION RATE: 12 BRPM | OXYGEN SATURATION: 100 % | SYSTOLIC BLOOD PRESSURE: 147 MMHG | HEART RATE: 70 BPM | DIASTOLIC BLOOD PRESSURE: 74 MMHG | WEIGHT: 220 LBS

## 2024-11-22 DIAGNOSIS — L92.9 GRANULATION TISSUE: ICD-10-CM

## 2024-11-22 DIAGNOSIS — L90.0 LICHEN SCLEROSUS: ICD-10-CM

## 2024-11-22 DIAGNOSIS — G89.18 POSTOPERATIVE PAIN: Primary | ICD-10-CM

## 2024-11-22 PROBLEM — K21.9 GASTROESOPHAGEAL REFLUX DISEASE: Status: ACTIVE | Noted: 2024-11-22

## 2024-11-22 LAB
GLUCOSE BLD MANUAL STRIP-MCNC: 106 MG/DL (ref 74–99)
GLUCOSE BLD MANUAL STRIP-MCNC: 107 MG/DL (ref 74–99)

## 2024-11-22 PROCEDURE — 7100000001 HC RECOVERY ROOM TIME - INITIAL BASE CHARGE: Performed by: STUDENT IN AN ORGANIZED HEALTH CARE EDUCATION/TRAINING PROGRAM

## 2024-11-22 PROCEDURE — 2500000004 HC RX 250 GENERAL PHARMACY W/ HCPCS (ALT 636 FOR OP/ED)

## 2024-11-22 PROCEDURE — 7100000002 HC RECOVERY ROOM TIME - EACH INCREMENTAL 1 MINUTE: Performed by: STUDENT IN AN ORGANIZED HEALTH CARE EDUCATION/TRAINING PROGRAM

## 2024-11-22 PROCEDURE — 88309 TISSUE EXAM BY PATHOLOGIST: CPT | Performed by: STUDENT IN AN ORGANIZED HEALTH CARE EDUCATION/TRAINING PROGRAM

## 2024-11-22 PROCEDURE — 3600000008 HC OR TIME - EACH INCREMENTAL 1 MINUTE - PROCEDURE LEVEL THREE: Performed by: STUDENT IN AN ORGANIZED HEALTH CARE EDUCATION/TRAINING PROGRAM

## 2024-11-22 PROCEDURE — 3700000002 HC GENERAL ANESTHESIA TIME - EACH INCREMENTAL 1 MINUTE: Performed by: STUDENT IN AN ORGANIZED HEALTH CARE EDUCATION/TRAINING PROGRAM

## 2024-11-22 PROCEDURE — 3600000003 HC OR TIME - INITIAL BASE CHARGE - PROCEDURE LEVEL THREE: Performed by: STUDENT IN AN ORGANIZED HEALTH CARE EDUCATION/TRAINING PROGRAM

## 2024-11-22 PROCEDURE — 2500000004 HC RX 250 GENERAL PHARMACY W/ HCPCS (ALT 636 FOR OP/ED): Performed by: STUDENT IN AN ORGANIZED HEALTH CARE EDUCATION/TRAINING PROGRAM

## 2024-11-22 PROCEDURE — 7100000009 HC PHASE TWO TIME - INITIAL BASE CHARGE: Performed by: STUDENT IN AN ORGANIZED HEALTH CARE EDUCATION/TRAINING PROGRAM

## 2024-11-22 PROCEDURE — 7100000010 HC PHASE TWO TIME - EACH INCREMENTAL 1 MINUTE: Performed by: STUDENT IN AN ORGANIZED HEALTH CARE EDUCATION/TRAINING PROGRAM

## 2024-11-22 PROCEDURE — 2500000005 HC RX 250 GENERAL PHARMACY W/O HCPCS: Performed by: STUDENT IN AN ORGANIZED HEALTH CARE EDUCATION/TRAINING PROGRAM

## 2024-11-22 PROCEDURE — 0755T DGTZ GLS MCRSCP SLD LEVEL VI: CPT | Mod: TC,SUR | Performed by: STUDENT IN AN ORGANIZED HEALTH CARE EDUCATION/TRAINING PROGRAM

## 2024-11-22 PROCEDURE — 2720000007 HC OR 272 NO HCPCS: Performed by: STUDENT IN AN ORGANIZED HEALTH CARE EDUCATION/TRAINING PROGRAM

## 2024-11-22 PROCEDURE — 3700000001 HC GENERAL ANESTHESIA TIME - INITIAL BASE CHARGE: Performed by: STUDENT IN AN ORGANIZED HEALTH CARE EDUCATION/TRAINING PROGRAM

## 2024-11-22 PROCEDURE — 82947 ASSAY GLUCOSE BLOOD QUANT: CPT

## 2024-11-22 PROCEDURE — 88305 TISSUE EXAM BY PATHOLOGIST: CPT | Performed by: STUDENT IN AN ORGANIZED HEALTH CARE EDUCATION/TRAINING PROGRAM

## 2024-11-22 PROCEDURE — 2500000001 HC RX 250 WO HCPCS SELF ADMINISTERED DRUGS (ALT 637 FOR MEDICARE OP): Performed by: STUDENT IN AN ORGANIZED HEALTH CARE EDUCATION/TRAINING PROGRAM

## 2024-11-22 RX ORDER — PROPOFOL 10 MG/ML
INJECTION, EMULSION INTRAVENOUS CONTINUOUS PRN
Status: DISCONTINUED | OUTPATIENT
Start: 2024-11-22 | End: 2024-11-22

## 2024-11-22 RX ORDER — ACETAMINOPHEN 500 MG
1000 TABLET ORAL EVERY 6 HOURS PRN
Qty: 56 TABLET | Refills: 0 | Status: SHIPPED | OUTPATIENT
Start: 2024-11-22 | End: 2024-11-29

## 2024-11-22 RX ORDER — LABETALOL HYDROCHLORIDE 5 MG/ML
5 INJECTION, SOLUTION INTRAVENOUS ONCE AS NEEDED
Status: DISCONTINUED | OUTPATIENT
Start: 2024-11-22 | End: 2024-11-22 | Stop reason: HOSPADM

## 2024-11-22 RX ORDER — LIDOCAINE HYDROCHLORIDE 10 MG/ML
0.1 INJECTION, SOLUTION EPIDURAL; INFILTRATION; INTRACAUDAL; PERINEURAL ONCE
Status: DISCONTINUED | OUTPATIENT
Start: 2024-11-22 | End: 2024-11-22 | Stop reason: HOSPADM

## 2024-11-22 RX ORDER — SODIUM CHLORIDE, SODIUM LACTATE, POTASSIUM CHLORIDE, CALCIUM CHLORIDE 600; 310; 30; 20 MG/100ML; MG/100ML; MG/100ML; MG/100ML
100 INJECTION, SOLUTION INTRAVENOUS CONTINUOUS
Status: DISCONTINUED | OUTPATIENT
Start: 2024-11-22 | End: 2024-11-22 | Stop reason: HOSPADM

## 2024-11-22 RX ORDER — TRAMADOL HYDROCHLORIDE 50 MG/1
50 TABLET ORAL EVERY 6 HOURS PRN
Qty: 10 TABLET | Refills: 0 | Status: SHIPPED | OUTPATIENT
Start: 2024-11-22

## 2024-11-22 RX ORDER — PROPOFOL 10 MG/ML
INJECTION, EMULSION INTRAVENOUS AS NEEDED
Status: DISCONTINUED | OUTPATIENT
Start: 2024-11-22 | End: 2024-11-22

## 2024-11-22 RX ORDER — ACETAMINOPHEN 325 MG/1
650 TABLET ORAL EVERY 4 HOURS PRN
Status: DISCONTINUED | OUTPATIENT
Start: 2024-11-22 | End: 2024-11-22 | Stop reason: HOSPADM

## 2024-11-22 RX ORDER — ONDANSETRON HYDROCHLORIDE 2 MG/ML
4 INJECTION, SOLUTION INTRAVENOUS ONCE AS NEEDED
Status: COMPLETED | OUTPATIENT
Start: 2024-11-22 | End: 2024-11-22

## 2024-11-22 RX ORDER — CEFAZOLIN 1 G/1
INJECTION, POWDER, FOR SOLUTION INTRAVENOUS AS NEEDED
Status: DISCONTINUED | OUTPATIENT
Start: 2024-11-22 | End: 2024-11-22

## 2024-11-22 RX ORDER — IBUPROFEN 600 MG/1
600 TABLET ORAL EVERY 6 HOURS SCHEDULED
Qty: 28 TABLET | Refills: 0 | Status: SHIPPED | OUTPATIENT
Start: 2024-11-22 | End: 2024-11-29

## 2024-11-22 RX ORDER — FENTANYL CITRATE 50 UG/ML
25 INJECTION, SOLUTION INTRAMUSCULAR; INTRAVENOUS EVERY 5 MIN PRN
Status: DISCONTINUED | OUTPATIENT
Start: 2024-11-22 | End: 2024-11-22 | Stop reason: HOSPADM

## 2024-11-22 RX ORDER — BACITRACIN ZINC 500 UNIT/G
OINTMENT IN PACKET (EA) TOPICAL AS NEEDED
Status: DISCONTINUED | OUTPATIENT
Start: 2024-11-22 | End: 2024-11-22 | Stop reason: HOSPADM

## 2024-11-22 RX ORDER — ACETAMINOPHEN 325 MG/1
975 TABLET ORAL ONCE
Status: COMPLETED | OUTPATIENT
Start: 2024-11-22 | End: 2024-11-22

## 2024-11-22 RX ORDER — ONDANSETRON HYDROCHLORIDE 2 MG/ML
INJECTION, SOLUTION INTRAVENOUS AS NEEDED
Status: DISCONTINUED | OUTPATIENT
Start: 2024-11-22 | End: 2024-11-22

## 2024-11-22 RX ORDER — AMOXICILLIN 250 MG
1 CAPSULE ORAL DAILY
Qty: 30 TABLET | Refills: 0 | Status: SHIPPED | OUTPATIENT
Start: 2024-11-22 | End: 2024-12-22

## 2024-11-22 RX ORDER — DROPERIDOL 2.5 MG/ML
0.62 INJECTION, SOLUTION INTRAMUSCULAR; INTRAVENOUS ONCE AS NEEDED
Status: DISCONTINUED | OUTPATIENT
Start: 2024-11-22 | End: 2024-11-22 | Stop reason: HOSPADM

## 2024-11-22 RX ORDER — SILVER NITRATE 38.21; 12.74 MG/1; MG/1
STICK TOPICAL AS NEEDED
Status: DISCONTINUED | OUTPATIENT
Start: 2024-11-22 | End: 2024-11-22 | Stop reason: HOSPADM

## 2024-11-22 RX ORDER — LIDOCAINE HYDROCHLORIDE 10 MG/ML
INJECTION, SOLUTION INFILTRATION; PERINEURAL AS NEEDED
Status: DISCONTINUED | OUTPATIENT
Start: 2024-11-22 | End: 2024-11-22 | Stop reason: HOSPADM

## 2024-11-22 RX ORDER — ALBUTEROL SULFATE 0.83 MG/ML
2.5 SOLUTION RESPIRATORY (INHALATION) ONCE AS NEEDED
Status: DISCONTINUED | OUTPATIENT
Start: 2024-11-22 | End: 2024-11-22 | Stop reason: HOSPADM

## 2024-11-22 RX ADMIN — HYDROMORPHONE HYDROCHLORIDE 0.5 MG: 1 INJECTION, SOLUTION INTRAMUSCULAR; INTRAVENOUS; SUBCUTANEOUS at 17:17

## 2024-11-22 RX ADMIN — HYDROMORPHONE HYDROCHLORIDE 0.5 MG: 1 INJECTION, SOLUTION INTRAMUSCULAR; INTRAVENOUS; SUBCUTANEOUS at 17:07

## 2024-11-22 RX ADMIN — ONDANSETRON 4 MG: 2 INJECTION INTRAMUSCULAR; INTRAVENOUS at 19:46

## 2024-11-22 RX ADMIN — ACETAMINOPHEN 975 MG: 325 TABLET ORAL at 14:47

## 2024-11-22 SDOH — HEALTH STABILITY: MENTAL HEALTH: CURRENT SMOKER: 0

## 2024-11-22 ASSESSMENT — PAIN SCALES - GENERAL
PAINLEVEL_OUTOF10: 1
PAINLEVEL_OUTOF10: 9
PAINLEVEL_OUTOF10: 8
PAINLEVEL_OUTOF10: 1
PAINLEVEL_OUTOF10: 2
PAINLEVEL_OUTOF10: 5 - MODERATE PAIN
PAIN_LEVEL: 3
PAINLEVEL_OUTOF10: 4

## 2024-11-22 ASSESSMENT — PAIN - FUNCTIONAL ASSESSMENT
PAIN_FUNCTIONAL_ASSESSMENT: 0-10

## 2024-11-22 ASSESSMENT — COLUMBIA-SUICIDE SEVERITY RATING SCALE - C-SSRS
6. HAVE YOU EVER DONE ANYTHING, STARTED TO DO ANYTHING, OR PREPARED TO DO ANYTHING TO END YOUR LIFE?: NO
2. HAVE YOU ACTUALLY HAD ANY THOUGHTS OF KILLING YOURSELF?: NO
1. IN THE PAST MONTH, HAVE YOU WISHED YOU WERE DEAD OR WISHED YOU COULD GO TO SLEEP AND NOT WAKE UP?: NO

## 2024-11-22 ASSESSMENT — PAIN DESCRIPTION - DESCRIPTORS: DESCRIPTORS: DISCOMFORT

## 2024-11-22 NOTE — H&P
"Gynecologic Oncology Surgery History and Physical    Subjective   Vanessa Son \"Jodei\" is a 74 y.o. with benign vulvar lesion presenting for WLE.    OBGYN Hx:  - ;  x 2  - Menarche at 13; Menopause at 33 (time of hysterectomy; denies HRT use for extended period of time  - Has previously used OCPs for ~1-2 years  - Last Pap 20+ years normal     Screening:  - Last Mammogram: 2017, declines further  - Last Colonoscopy: polyp removed, repeat 5 years     Social Hx:  Vanessa Son  reports that she quit smoking about 46 years ago. Her smoking use included cigarettes. She has been exposed to tobacco smoke. She has never used smokeless tobacco.  She  reports that she does not currently use alcohol.  She  reports no history of drug use.  Lives at home with . Retired, previously worked as .     Obstetrical History   OB History    Para Term  AB Living   2 2           SAB IAB Ectopic Multiple Live Births                  # Outcome Date GA Lbr Zack/2nd Weight Sex Type Anes PTL Lv   2 Para            1 Para                Past Medical History  Past Medical History:   Diagnosis Date    Adverse effect of anesthesia     Memory loss with anesthesia. States that she will forget certain names or words and this memory never returns.    Anxiety     CKD (chronic kidney disease)     stage 1    Colovaginal fistula     EUA flex/sig on 24    Delayed emergence from general anesthesia     Depression     takes [place] Wort oral suppletments    GERD (gastroesophageal reflux disease)     History of blood transfusion     HLD (hyperlipidemia)     HTN (hypertension)     Hx of mammogram 2017    cat 2--declines further    Lichen sclerosus     Lumbar disc disease     Mammogram declined     PONV (postoperative nausea and vomiting)     Sleep apnea     NOT ON CPAP    Type 2 diabetes mellitus     A1C was 6.1 on 24    Uterine cancer (Multi)     Vaginal lesion         Past Surgical History   Past " "Surgical History:   Procedure Laterality Date    APPENDECTOMY  1963    CATARACT EXTRACTION  2018    CHOLECYSTECTOMY  1974    COLON SURGERY  2024    colovaginal fistula repair    COLONOSCOPY  2024    adenoma-due     HYSTERECTOMY  1991    uterine CA limited no rad tx nor chemo    JOINT REPLACEMENT      TONSILLECTOMY  1991    TOTAL KNEE ARTHROPLASTY Right 10/2021    TUBAL LIGATION  1977       Social History  Social History     Tobacco Use    Smoking status: Former     Current packs/day: 0.00     Types: Cigarettes     Quit date:      Years since quittin.9     Passive exposure: Past    Smokeless tobacco: Never   Substance Use Topics    Alcohol use: Not Currently     Substance and Sexual Activity   Drug Use Never       Allergies  Codeine, Norgestrel-ethinyl estradiol, Atorvastatin, Enalapril, Hydroxyzine, Simvastatin, and Terazosin     Medications  Refer to med tab for listed medications. Will verify with patient.    ROS: negative except per HPI    Objective    Last Vitals  To be obtained. Will fu    Physical Examination  General: no acute distress  HEENT: normocephalic, atraumatic  Heart: warm and well perfused  Lungs: breathing comfortably on room air  Abdomen: nondistended  Extremities: moving all extremities  Neuro: awake and conversant  Psych: appropriate mood and affect    Lab Review          Lab Results   Component Value Date    WBC 4.5 2024    HGB 12.5 2024    HCT 37.3 2024    MCV 91 2024     2024       Lab Results   Component Value Date    GLUCOSE 126 (H) 2024    CALCIUM 9.4 2024     2024    K 4.0 2024    CO2 30 2024     2024    BUN 27 (H) 2024    CREATININE 0.79 2024       Assessment/Plan     Vanessa Son \"Jodie\" is a 74 y.o. with benign vulvar lesion presenting for scheduled surgery.    Plan to proceed with wide local excision  Surgical consent was reviewed. The risks of surgery were discussed " including: bleeding (including need for blood transfusion in life-threatening situations; risks of transfusion), infection, damage to surrounding organs. The patient had the opportunity to answer questions and desired to proceed with surgery following our discussion. Both verbal and written consents were obtained.    To be d/w Dr. Melanie Anthony MD PGY1  Gynecology Oncology, Pager [phone]

## 2024-11-22 NOTE — DISCHARGE INSTRUCTIONS
Vulvar Care after vulvar surgery  1. AVOID TENSION ON THE SUTURES (stretching and pulling) as much as possible until your postoperative visit in the clinic.  This will mean limiting your activities during the healing process.  Get in and out of bed with care.  Avoid sitting as much as is feasible.  2. Perform sitz baths or rinse the area with a hand held shower device two to three times per day using lukewarm water.  Avoid soap and do not rub.  3. IMPORTANT - KEEP THE INCISIONS AS DRY AS POSSIBLE in between sitz baths.  After each sitz bath, blot the area with a towel (do not rub).  You may even use a warm (not hot) blow dryer to further dry the area.    4. Your vulvar/vaginal area will tend to be moist with a light drainage.  Excess moisture may increase risk for infection.  To reduce excess moisture buildup, keep a dry wash cloth between your legs.  Replace as necessary to keep dry. Take measures to keep the inguinal (groin) area dry as well.  5. Avoid constipation and straining with bowel movements.  You may take 100 mg of Colace twice daily as a stool softener (over the counter).  Stronger laxatives can be used as necessary.  Perform your sitz baths or shower rinses preferentially after bowel movements.  6. You can ice the area by applying an ice pack or frozen peas to the area. Do not ice for longer than 20 minutes  7.  If you have questions or experience fevers greater than 101 F degrees, foul-smelling drainage, excessive pain not relieved by prescribed medications, or have any questions after your discharge, call # [phone].  After hours your providers will be contacted via an answering service.

## 2024-11-22 NOTE — ANESTHESIA POSTPROCEDURE EVALUATION
"Patient: Vanessa Son \"Jodie\"    Procedure Summary       Date: 11/22/24 Room / Location: [facility] OR 02 / [facility]    Anesthesia Start: 1537 Anesthesia Stop: 1705    Procedure: Simple Vulvectomy (Bilateral) Diagnosis:       Lichen sclerosus      Granulation tissue      (Lichen sclerosus [L90.0])      (Granulation tissue [L92.9])    Surgeons: Adina Navarro MD MPH Responsible Provider: Maria Esther Wesley MD    Anesthesia Type: MAC ASA Status: 3            Anesthesia Type: MAC    Vitals Value Taken Time   /86 11/22/24 1700   Temp 36.3 11/22/24 1705   Pulse 80 11/22/24 1701   Resp 12 11/22/24 1701   SpO2 97 % 11/22/24 1701   Vitals shown include unfiled device data.    Anesthesia Post Evaluation    Patient location during evaluation: PACU  Patient participation: complete - patient participated  Level of consciousness: awake  Pain score: 3  Pain management: adequate  Multimodal analgesia pain management approach  Airway patency: patent  Two or more strategies used to mitigate risk of obstructive sleep apnea  Cardiovascular status: acceptable  Respiratory status: acceptable  Hydration status: acceptable  Postoperative Nausea and Vomiting: none        There were no known notable events for this encounter.    "

## 2024-11-22 NOTE — ANESTHESIA PREPROCEDURE EVALUATION
"Patient: Vanessa Son \"Jodie\"    Procedure Information       Date/Time: 11/22/24 1455    Procedure: Vulvectomy Simple (Bilateral)    Location: [facility] OR 02 / [facility] OR    Surgeons: Adina Navarro MD MPH            Relevant Problems   Anesthesia (within normal limits)      Cardiac   (+) Essential hypertension   (+) Mixed hyperlipidemia      GI   (+) Gastroesophageal reflux disease      Endocrine   (+) 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 (Multi)   (+) Type 2 diabetes mellitus with hyperglycemia, with long-term current use of insulin      Musculoskeletal   (+) Primary osteoarthritis of right knee      Infectious/Inflammatory   (+) Lichen sclerosus       Clinical information reviewed:   Tobacco  Allergies  Meds   Med Hx  Surg Hx   Fam Hx  Soc Hx        NPO Detail:  NPO/Void Status  Date of Last Liquid: 11/22/24  Time of Last Liquid: 1130  Date of Last Solid: 11/22/24  Time of Last Solid: 0000  Last Intake Type: Carbohydrate drink; Clear fluids         Physical Exam    Airway  Mallampati: II  TM distance: >3 FB  Neck ROM: full     Cardiovascular   Rhythm: regular  Rate: normal     Dental - normal exam     Pulmonary   Breath sounds clear to auscultation     Abdominal            Anesthesia Plan    History of general anesthesia?: yes  History of complications of general anesthesia?: no    ASA 3     MAC     The patient is not a current smoker.    intravenous induction   Postoperative administration of opioids is intended.  Trial extubation is planned.  Anesthetic plan and risks discussed with patient.  Use of blood products discussed with patient who consented to blood products.    Plan discussed with resident.      "

## 2024-11-24 NOTE — OP NOTE
"Simple Vulvectomy (B) Operative Note     Date: 2024  OR Location: [facility] OR    Name: Vanessa oSn \"Valerie", : 1950, Age: 74 y.o., MRN: [id number], Sex: female    Diagnosis  Pre-op Diagnosis      * Lichen sclerosus [L90.0]     * Granulation tissue [L92.9] Post-op Diagnosis     * Lichen sclerosus [L90.0]     * Granulation tissue [L92.9]     Procedures  Simple Vulvectomy  48547 - SC VULVECTOMY SIMPLE COMPLETE      Surgeons      * Adina Navarro - Primary    Resident/Fellow/Other Assistant:  Surgeons and Role:     * Linh Argueta MD - Resident - Assisting     * Opal Anthony MD - Resident - Assisting    Staff:   Circulator: Jessy Low Person: Raheem    Anesthesia Staff: Anesthesiologist: Maria Esther Wesley MD  Anesthesia Resident: Valentin Delgadillo MD    Procedure Summary  Anesthesia: Monitor Anesthesia Care  ASA: III  Estimated Blood Loss: 15mL  Intra-op Medications:   Administrations occurring from 1455 to 1650 on 24:   Medication Name Total Dose   lidocaine (Xylocaine) 10 mg/mL (1 %) injection 30 mL   silver nitrate applicators applicator 1 Application   surgical lubricant gel 1 Application   bacitracin ointment 1 Application   ceFAZolin (Ancef) vial 1 g 2 g   dexAMETHasone (Decadron) injection 4 mg/mL 4 mg   dexmedeTOMIDine (Precedex) bolus from bag 8 mcg   ondansetron (Zofran) 2 mg/mL injection 4 mg   propofol (Diprivan) infusion 10 mg/mL 850.8 mg   propofol (Diprivan) injection 10 mg/mL 200 mg              Anesthesia Record               Intraprocedure I/O Totals          Intake    Dexmedetomidine 0.00 mL    The total shown is the total volume documented since Anesthesia Start was filed.    Propofol Drip 85.08 mL    The total shown is the total volume documented since Anesthesia Start was filed.    Total Intake 85.08 mL          Specimen:   ID Type Source Tests Collected by Time   1 : LEFT VULVECTOMY STITCH @12 Tissue VULVA PARTIAL VULVECTOMY SURGICAL PATHOLOGY EXAM Adina Navarro MD MPH " "11/22/2024 1607   2 : RIGHT VULVECTOMY STITCH @12 Tissue VULVA PARTIAL VULVECTOMY SURGICAL PATHOLOGY EXAM Adina Navarro MD MPH 11/22/2024 1608   3 : ERIC-CLITORAL BIOPSY Tissue SOFT TISSUE BIOPSY SURGICAL PATHOLOGY EXAM Adina Navarro MD MPH 11/22/2024 1633                 Drains and/or Catheters: * None in log *      Findings: 3 x 4 cm lesion posterior fourchette extending L labia major consistent with biopsy proven granulation tissue. Multifocal lesion R labia major extending over the Prepuce of the clitoris with raised white lesions with irregular borders. Lesion measured 2 x 3 cm at the posterior aspect overlying the labia major on the R and 2 x 4 cm overlying the prepuce of the clitoris. Lesion within 1.5 - 2 cm of the urethral meatus. [place] paper thin skin overlying the L labia major and fusing the labia major and minor. No eric anal findings of LS.     Indications: Vanessa Son \"Valerie" is an 74 y.o. female who is having surgery for Lichen sclerosus [L90.0]  Granulation tissue [L92.9].     The patient was seen in the preoperative area. The risks, benefits, complications, treatment options, non-operative alternatives, expected recovery and outcomes were discussed with the patient. The possibilities of reaction to medication, pulmonary aspiration, injury to surrounding structures, bleeding, recurrent infection, the need for additional procedures, failure to diagnose a condition, and creating a complication requiring transfusion or operation were discussed with the patient. The patient concurred with the proposed plan, giving informed consent.  The site of surgery was properly noted/marked if necessary per policy. The patient has been actively warmed in preoperative area. Preoperative antibiotics have been ordered and given within 1 hours of incision. Venous thrombosis prophylaxis have been ordered including bilateral sequential compression devices    Procedure Details:   The patient was taken to the " operating room where she had sequential compression devices placed. A safety check confirming patient's name and MR number was performed. General anesthesia was administered and found to be adequate. The patient was positioned in low lithotomy with use of yellow fins. The perineum and vagina were prepped and draped in normal sterile fashion. The perineal lesions were marked and infiltrated with local anesthetic until adequate blanching noted.    Vulvar lesion was marked and excised in an ellipse shape with surrounding 0.5 - 1 centimeter of grossly uninvolved circumferential margins using the bovie. Electrocautery was used to divide the specimen from underlying subcutaneous tissue. Total defect measured 4x4 cm on the L. Hemostasis was achieved with the use of electrocautery. We then proceeded with closure of the perineal incision. Subcutaneous tissue was reapproximated using 2-0 Vicryl in an interrupted fashion. Skin was reapproximated with a 3-0 Vicryl in a running subcuticular fashion.    We then turned our attention to the right side where a similar procedure was performed. Vulvar lesion was marked and excised in an ellipse shape with surrounding 0.5 - 1 centimeter of grossly uninvolved circumferential margins using the bovie. Electrocautery was used to divide the specimen from underlying subcutaneous tissue. Total defect measured 3 x 6 cm on the R. Additional sabas-clitoral margin taken to adequately excise irregular, raised tissue. Hemostasis was achieved with the use of electrocautery. We then proceeded with closure of the perineal incision. Subcutaneous tissue was reapproximated using 2-0 Vicryl in an interrupted fashion. Skin was reapproximated with a 3-0 Vicryl in a running subcuticular fashion.    Bacitracin ointment was applied to the incisions at the end of the procedure. The patient was returned to the dorsal supine position.     All sponge, lap and needle counts were correct at the end of the procedure.  Overall the patient tolerated procedure well and was taken to PACU in stable condition.    Complications:  None; patient tolerated the procedure well.    Disposition: PACU - hemodynamically stable.  Condition: stable       Attending Attestation: I was present and scrubbed for the entire procedure.    Adina Navarro  Phone Number: [phone]

## 2024-12-11 ENCOUNTER — OFFICE VISIT (OUTPATIENT)
Dept: GYNECOLOGIC ONCOLOGY | Facility: CLINIC | Age: 74
End: 2024-12-11
Payer: MEDICARE

## 2024-12-11 VITALS
TEMPERATURE: 96.8 F | BODY MASS INDEX: 38.52 KG/M2 | WEIGHT: 222.66 LBS | HEART RATE: 78 BPM | DIASTOLIC BLOOD PRESSURE: 71 MMHG | SYSTOLIC BLOOD PRESSURE: 137 MMHG | OXYGEN SATURATION: 96 % | RESPIRATION RATE: 17 BRPM

## 2024-12-11 DIAGNOSIS — Z98.890 POST-OPERATIVE STATE: Primary | ICD-10-CM

## 2024-12-11 DIAGNOSIS — L90.0 LICHEN SCLEROSUS: ICD-10-CM

## 2024-12-11 DIAGNOSIS — L92.9 GRANULATION TISSUE: ICD-10-CM

## 2024-12-11 PROCEDURE — 99211 OFF/OP EST MAY X REQ PHY/QHP: CPT | Performed by: STUDENT IN AN ORGANIZED HEALTH CARE EDUCATION/TRAINING PROGRAM

## 2024-12-11 ASSESSMENT — PAIN SCALES - GENERAL: PAINLEVEL_OUTOF10: 0-NO PAIN

## 2024-12-11 NOTE — PROGRESS NOTES
Patient ID: Jodie Son is a 74 y.o. female.  Referring Physician: No referring provider defined for this encounter.  Primary Care Provider: Lalitha Ramirez MD    Subjective    Here for POV s/p WLE 11/22 for LS, symptomatic granulation tissue. Pathology is pending. Doing well. Pain is markedly improved. Vaginal bleeding ongoing but mild. Denies vaginal discharge.     Objective    BSA: 2.13 meters squared  /71   Pulse 78   Temp 36 °C (96.8 °F)   Resp 17   Wt 101 kg (222 lb 10.6 oz)   LMP  (LMP Unknown)   SpO2 96%   BMI 38.52 kg/m²      Physical Exam  Vitals and nursing note reviewed. Exam conducted with a chaperone present.   Constitutional:       General: She is not in acute distress.     Appearance: Normal appearance.   HENT:      Head: Normocephalic and atraumatic.      Mouth/Throat:      Mouth: Mucous membranes are moist.      Pharynx: No oropharyngeal exudate or posterior oropharyngeal erythema.   Eyes:      Extraocular Movements: Extraocular movements intact.      Conjunctiva/sclera: Conjunctivae normal.      Pupils: Pupils are equal, round, and reactive to light.   Neck:      Thyroid: No thyroid mass or thyromegaly.   Cardiovascular:      Rate and Rhythm: Normal rate and regular rhythm.      Pulses: Normal pulses.      Heart sounds: Normal heart sounds.   Pulmonary:      Effort: Pulmonary effort is normal.      Breath sounds: Normal breath sounds. No wheezing, rhonchi or rales.   Abdominal:      General: Bowel sounds are normal. There is no distension.      Palpations: Abdomen is soft. There is no mass.      Tenderness: There is no abdominal tenderness.      Hernia: No hernia is present.   Genitourinary:     Comments: Vulva - skin well approximated, bruising noted right and left vulva, stitches present  Musculoskeletal:         General: No tenderness. Normal range of motion.      Cervical back: Normal range of motion and neck supple. No tenderness.      Right lower leg: No edema.       Left lower leg: No edema.   Skin:     General: Skin is warm.      Findings: No lesion or rash.   Neurological:      General: No focal deficit present.      Mental Status: She is alert and oriented to person, place, and time.   Psychiatric:         Mood and Affect: Mood normal.         Behavior: Behavior normal.         Performance Status:  Asymptomatic    Assessment/Plan   74 y.o. with LS, granulation tissue s/p WLE here for POV with pathology pending  Comorbidities: T2DM, HLD, HTN, uterine cancer, colovaginal fistula    # Post op  - Recovering well  - Reviewed ongoing restrictions (no lifting more than 10-15lb, nothing per vagina, no soaking)  - Pathology pending, will call with results when available    RTC TBD    Adina Navarro MD MPH

## 2024-12-30 DIAGNOSIS — E11.65 TYPE 2 DIABETES MELLITUS WITH HYPERGLYCEMIA, WITH LONG-TERM CURRENT USE OF INSULIN: ICD-10-CM

## 2024-12-30 DIAGNOSIS — Z79.4 TYPE 2 DIABETES MELLITUS WITH HYPERGLYCEMIA, WITH LONG-TERM CURRENT USE OF INSULIN: ICD-10-CM

## 2024-12-31 ENCOUNTER — TELEPHONE (OUTPATIENT)
Dept: GYNECOLOGIC ONCOLOGY | Facility: HOSPITAL | Age: 74
End: 2024-12-31
Payer: MEDICARE

## 2024-12-31 RX ORDER — INSULIN LISPRO 100 [IU]/ML
INJECTION, SOLUTION INTRAVENOUS; SUBCUTANEOUS
Qty: 45 ML | Refills: 3 | Status: SHIPPED | OUTPATIENT
Start: 2024-12-31

## 2024-12-31 NOTE — TELEPHONE ENCOUNTER
Phoned patient per Dr. Navarro recommendation to update that surgical pathology results are still pending and Dr. Navarro hopes to have information regarding pathology results in 2 weeks or so.    Message left on patient voice mail with pathology update.

## 2025-01-06 ENCOUNTER — LAB (OUTPATIENT)
Dept: LAB | Facility: LAB | Age: 75
End: 2025-01-06
Payer: MEDICARE

## 2025-01-06 DIAGNOSIS — E11.65 TYPE 2 DIABETES MELLITUS WITH HYPERGLYCEMIA, WITH LONG-TERM CURRENT USE OF INSULIN: ICD-10-CM

## 2025-01-06 DIAGNOSIS — I10 ESSENTIAL HYPERTENSION: ICD-10-CM

## 2025-01-06 DIAGNOSIS — Z79.4 TYPE 2 DIABETES MELLITUS WITH HYPERGLYCEMIA, WITH LONG-TERM CURRENT USE OF INSULIN: ICD-10-CM

## 2025-01-06 DIAGNOSIS — E78.2 MIXED HYPERLIPIDEMIA: ICD-10-CM

## 2025-01-06 DIAGNOSIS — E79.0 HYPERURICEMIA: ICD-10-CM

## 2025-01-06 LAB
ALBUMIN SERPL BCP-MCNC: 3.7 G/DL (ref 3.4–5)
ALP SERPL-CCNC: 59 U/L (ref 33–136)
ALT SERPL W P-5'-P-CCNC: 15 U/L (ref 7–45)
ANION GAP SERPL CALC-SCNC: 11 MMOL/L (ref 10–20)
AST SERPL W P-5'-P-CCNC: 20 U/L (ref 9–39)
BILIRUB SERPL-MCNC: 0.8 MG/DL (ref 0–1.2)
BUN SERPL-MCNC: 21 MG/DL (ref 6–23)
CALCIUM SERPL-MCNC: 10.5 MG/DL (ref 8.6–10.3)
CHLORIDE SERPL-SCNC: 100 MMOL/L (ref 98–107)
CHOLEST SERPL-MCNC: 186 MG/DL (ref 0–199)
CHOLESTEROL/HDL RATIO: 4.9
CO2 SERPL-SCNC: 32 MMOL/L (ref 21–32)
CREAT SERPL-MCNC: 0.77 MG/DL (ref 0.5–1.05)
CREAT UR-MCNC: 79.5 MG/DL (ref 20–320)
EGFRCR SERPLBLD CKD-EPI 2021: 82 ML/MIN/1.73M*2
ERYTHROCYTE [DISTWIDTH] IN BLOOD BY AUTOMATED COUNT: 14.1 % (ref 11.5–14.5)
EST. AVERAGE GLUCOSE BLD GHB EST-MCNC: 143 MG/DL
GLUCOSE SERPL-MCNC: 146 MG/DL (ref 74–99)
HBA1C MFR BLD: 6.6 %
HCT VFR BLD AUTO: 37.2 % (ref 36–46)
HDLC SERPL-MCNC: 38.3 MG/DL
HGB BLD-MCNC: 12.7 G/DL (ref 12–16)
LDLC SERPL CALC-MCNC: 102 MG/DL
MCH RBC QN AUTO: 30.3 PG (ref 26–34)
MCHC RBC AUTO-ENTMCNC: 34.1 G/DL (ref 32–36)
MCV RBC AUTO: 89 FL (ref 80–100)
MICROALBUMIN UR-MCNC: 203.5 MG/L
MICROALBUMIN/CREAT UR: 256 UG/MG CREAT
NON HDL CHOLESTEROL: 148 MG/DL (ref 0–149)
NRBC BLD-RTO: 0 /100 WBCS (ref 0–0)
PLATELET # BLD AUTO: 172 X10*3/UL (ref 150–450)
POTASSIUM SERPL-SCNC: 4 MMOL/L (ref 3.5–5.3)
PROT SERPL-MCNC: 6.5 G/DL (ref 6.4–8.2)
RBC # BLD AUTO: 4.19 X10*6/UL (ref 4–5.2)
SODIUM SERPL-SCNC: 139 MMOL/L (ref 136–145)
TRIGL SERPL-MCNC: 228 MG/DL (ref 0–149)
URATE SERPL-MCNC: 8 MG/DL (ref 2.3–6.7)
VLDL: 46 MG/DL (ref 0–40)
WBC # BLD AUTO: 5.6 X10*3/UL (ref 4.4–11.3)

## 2025-01-06 PROCEDURE — 84550 ASSAY OF BLOOD/URIC ACID: CPT

## 2025-01-06 PROCEDURE — 85027 COMPLETE CBC AUTOMATED: CPT

## 2025-01-06 PROCEDURE — 83036 HEMOGLOBIN GLYCOSYLATED A1C: CPT

## 2025-01-06 PROCEDURE — 82570 ASSAY OF URINE CREATININE: CPT

## 2025-01-06 PROCEDURE — 82043 UR ALBUMIN QUANTITATIVE: CPT

## 2025-01-06 PROCEDURE — 80061 LIPID PANEL: CPT

## 2025-01-06 PROCEDURE — 80053 COMPREHEN METABOLIC PANEL: CPT

## 2025-01-13 ENCOUNTER — APPOINTMENT (OUTPATIENT)
Dept: PRIMARY CARE | Facility: CLINIC | Age: 75
End: 2025-01-13
Payer: MEDICARE

## 2025-01-13 VITALS
HEIGHT: 64 IN | BODY MASS INDEX: 38.76 KG/M2 | RESPIRATION RATE: 16 BRPM | WEIGHT: 227 LBS | SYSTOLIC BLOOD PRESSURE: 118 MMHG | HEART RATE: 72 BPM | DIASTOLIC BLOOD PRESSURE: 60 MMHG | OXYGEN SATURATION: 98 % | TEMPERATURE: 97.3 F

## 2025-01-13 DIAGNOSIS — N18.1 CKD STAGE 1 DUE TO TYPE 2 DIABETES MELLITUS (MULTI): ICD-10-CM

## 2025-01-13 DIAGNOSIS — L90.0 LICHEN SCLEROSUS: ICD-10-CM

## 2025-01-13 DIAGNOSIS — E78.2 MIXED HYPERLIPIDEMIA: ICD-10-CM

## 2025-01-13 DIAGNOSIS — E11.22 CKD STAGE 1 DUE TO TYPE 2 DIABETES MELLITUS (MULTI): ICD-10-CM

## 2025-01-13 DIAGNOSIS — K76.6 PORTAL HYPERTENSION (MULTI): Primary | ICD-10-CM

## 2025-01-13 DIAGNOSIS — I10 ESSENTIAL HYPERTENSION: ICD-10-CM

## 2025-01-13 DIAGNOSIS — M10.9 GOUT, UNSPECIFIED CAUSE, UNSPECIFIED CHRONICITY, UNSPECIFIED SITE: ICD-10-CM

## 2025-01-13 DIAGNOSIS — Z88.8 ALLERGY TO STATIN MEDICATION: ICD-10-CM

## 2025-01-13 DIAGNOSIS — Z79.4 TYPE 2 DIABETES MELLITUS WITH HYPERGLYCEMIA, WITH LONG-TERM CURRENT USE OF INSULIN: ICD-10-CM

## 2025-01-13 DIAGNOSIS — Z00.00 ENCOUNTER FOR MEDICARE ANNUAL WELLNESS EXAM: ICD-10-CM

## 2025-01-13 DIAGNOSIS — E11.65 TYPE 2 DIABETES MELLITUS WITH HYPERGLYCEMIA, WITH LONG-TERM CURRENT USE OF INSULIN: ICD-10-CM

## 2025-01-13 PROCEDURE — 1170F FXNL STATUS ASSESSED: CPT | Performed by: FAMILY MEDICINE

## 2025-01-13 PROCEDURE — 1159F MED LIST DOCD IN RCRD: CPT | Performed by: FAMILY MEDICINE

## 2025-01-13 PROCEDURE — 99397 PER PM REEVAL EST PAT 65+ YR: CPT | Performed by: FAMILY MEDICINE

## 2025-01-13 PROCEDURE — 1123F ACP DISCUSS/DSCN MKR DOCD: CPT | Performed by: FAMILY MEDICINE

## 2025-01-13 PROCEDURE — 1158F ADVNC CARE PLAN TLK DOCD: CPT | Performed by: FAMILY MEDICINE

## 2025-01-13 PROCEDURE — 3049F LDL-C 100-129 MG/DL: CPT | Performed by: FAMILY MEDICINE

## 2025-01-13 PROCEDURE — G0439 PPPS, SUBSEQ VISIT: HCPCS | Performed by: FAMILY MEDICINE

## 2025-01-13 PROCEDURE — 3044F HG A1C LEVEL LT 7.0%: CPT | Performed by: FAMILY MEDICINE

## 2025-01-13 PROCEDURE — 3078F DIAST BP <80 MM HG: CPT | Performed by: FAMILY MEDICINE

## 2025-01-13 PROCEDURE — 3060F POS MICROALBUMINURIA REV: CPT | Performed by: FAMILY MEDICINE

## 2025-01-13 PROCEDURE — 1036F TOBACCO NON-USER: CPT | Performed by: FAMILY MEDICINE

## 2025-01-13 PROCEDURE — 3008F BODY MASS INDEX DOCD: CPT | Performed by: FAMILY MEDICINE

## 2025-01-13 PROCEDURE — 1160F RVW MEDS BY RX/DR IN RCRD: CPT | Performed by: FAMILY MEDICINE

## 2025-01-13 PROCEDURE — 3074F SYST BP LT 130 MM HG: CPT | Performed by: FAMILY MEDICINE

## 2025-01-13 RX ORDER — PEN NEEDLE, DIABETIC 30 GX3/16"
NEEDLE, DISPOSABLE MISCELLANEOUS
Qty: 500 EACH | Refills: 3 | Status: SHIPPED | OUTPATIENT
Start: 2025-01-13

## 2025-01-13 ASSESSMENT — ACTIVITIES OF DAILY LIVING (ADL)
DOING_HOUSEWORK: INDEPENDENT
MANAGING_FINANCES: INDEPENDENT
TAKING_MEDICATION: INDEPENDENT
GROCERY_SHOPPING: INDEPENDENT
DRESSING: INDEPENDENT
BATHING: INDEPENDENT

## 2025-01-13 ASSESSMENT — ENCOUNTER SYMPTOMS
OCCASIONAL FEELINGS OF UNSTEADINESS: 0
DEPRESSION: 0
LOSS OF SENSATION IN FEET: 1

## 2025-01-13 ASSESSMENT — PATIENT HEALTH QUESTIONNAIRE - PHQ9
1. LITTLE INTEREST OR PLEASURE IN DOING THINGS: SEVERAL DAYS
2. FEELING DOWN, DEPRESSED OR HOPELESS: SEVERAL DAYS
10. IF YOU CHECKED OFF ANY PROBLEMS, HOW DIFFICULT HAVE THESE PROBLEMS MADE IT FOR YOU TO DO YOUR WORK, TAKE CARE OF THINGS AT HOME, OR GET ALONG WITH OTHER PEOPLE: NOT DIFFICULT AT ALL
SUM OF ALL RESPONSES TO PHQ9 QUESTIONS 1 AND 2: 2

## 2025-01-13 NOTE — PROGRESS NOTES
Covid vax: x 2  Flu: declined  Pneumo: UTD  RSV: advised  Shingles: advised    CRC: due 2029  Mammogram: declined  Pap: hysterectomy  Lmp: hysterectomy

## 2025-01-13 NOTE — PROGRESS NOTES
Subjective   Reason for Visit: Vanessa Son is a 74 y.o. female here for a Medicare Wellness visit.   Covid vax: x 2  Flu: declined  Pneumo: UTD  RSV: advised  Shingles: advised     CRC: due 2029  Mammogram: declined  Pap: hysterectomy  Lmp: hysterectomy  No further vag bleeding-now feels GOOD  More energy    CHECKLIST REVIEWED AND COMPLETE FOR [facility] Medicare Annual Wellness Visit Subsequent, Diabetes, Hypertension, Chronic Kidney Disease, Hyperlipidemia, and Medication Problem (Insurance will not be covering victoza--alternatives are ozempic, trulicity, mounjaro)  ---------------------------------------------------------------------------------------------  Past Medical, Surgical, and Family History reviewed and updated in chart.    Reviewed all medications by prescribing practitioner or clinical pharmacist (such as prescriptions, OTCs, herbal therapies and supplements) and documented in the medical record.    HPI    Patient Self Assessment of Health Status  Patient Self Assessment: Good    Nutrition and Exercise:    Current Diet: HEALTHY Diet always best, minimizing excess carbs,   weight reduction advised if BMI not WNL, please maintain a NORMAL BMI 18.5-24.9    Adequate Fluid Intake: Yes  Caffeine: -aware to minimize intake, <300mg best to even take in LESS  Exercise Frequency: Regularly advised, weight bearing, strengthening, aerobic    Functional Ability/Level of Safety:  Home safety addressed: no active new concerns,   fall risk addressed  NO new hearing issues or concerns  [place]endall in ADLs addressed and areas of assistance if present -noted    ANY Cognitive Impairment Observed: No cognitive impairment observed    Home Safety Risk Factors: None  --------------------------------------------------------------------------------------------  Patient Care Team:  Lalitha Ramirez MD as PCP - General    HPI  Patient Active Problem List   Diagnosis    Arthritis of knee, left    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  "diabetes mellitus (Multi)    Essential hypertension    Gout    Hyperuricemia    Hematuria    Lichen sclerosus    Primary osteoarthritis of right knee    Status post total knee replacement    Class 3 severe obesity due to excess calories with serious comorbidity and body mass index (BMI) of 40.0 to 44.9 in adult    Type 2 diabetes mellitus with hyperglycemia, with long-term current use of insulin    Mixed hyperlipidemia    Colovaginal fistula    Granulation tissue    Gastroesophageal reflux disease    Portal hypertension (Multi)      Past Surgical History:   Procedure Laterality Date    APPENDECTOMY  1963    CATARACT EXTRACTION Bilateral 2018    CHOLECYSTECTOMY  1974    COLON SURGERY  05/2024    colovaginal fistula repair    COLONOSCOPY  09/2024    adenoma-due 2029    HYSTERECTOMY  1991    uterine CA limited no rad tx nor chemo    TONSILLECTOMY  1991    TOTAL KNEE ARTHROPLASTY Right 10/2021    TUBAL LIGATION  1977         PHQ2(-) No active depressed mood or not in crisis, 5min. spent in discussion.    Review of Systems:    NO Seizures  NO CAD  NO CVA    This patient has   NO history of recent Covid nor flu symptoms,  NO Fever nor chills,  NO Chest pain, shortness of breath nor paroxysmal nocturnal dyspnea,  NO Nausea, vomiting, nor diarrhea,  NO Hematochezia nor melena,  NO Dysuria, hematuria, nor new incontinence issues  NO new severe headaches nor neurological complaints,  NO new issues with anxiety nor depression nor new psychiatric complaints,  NO suicidal nor homicidal ideations.  ---------------------------------------------------------------------------------------------   OBJECTIVE:  /60   Pulse 72   Temp 36.3 °C (97.3 °F) (Temporal)   Resp 16   Ht 1.619 m (5' 3.75\")   Wt 103 kg (227 lb)   LMP  (LMP Unknown)   SpO2 98%   BMI 39.27 kg/m²      General:  alert, oriented, no acute distress.  No obvious skin rashes noted.   No gait disturbance noted.    Mood is pleasant, not tearful, no signs of " emotional distress.  Not appearing intoxicated or altered.   No voiced delusions,   Normal, appropriate behavior.    HEENT: Normocephalic, atraumatic,   Pupils round, reactive to light  Extraocular motions intact and wnl  Tympanic membranes normal    Neck: no nuchal rigidity  No masses palpable.  No carotid bruits.  No thyromegaly.    Respiratory: Equal breath sounds  No wheezes,    rales,    nor rhonchi  No respiratory distress.    Heart: Regular rate and rhythm, no    murmurs  no rubs/gallops    Abdomen: no masses palpable, no rebound nor guarding, no rebound nor guarding.  Mildly ovwt  Extremities: NO cyanosis noted, no clubbing.   No edema noted.  2+dorsalis pedis pulses.    Normal-not antalgic, steady gait. No foot lesions  Good feet-dry      Lab on 01/06/2025   Component Date Value Ref Range Status    Glucose 01/06/2025 146 (H)  74 - 99 mg/dL Final    Sodium 01/06/2025 139  136 - 145 mmol/L Final    Potassium 01/06/2025 4.0  3.5 - 5.3 mmol/L Final    Chloride 01/06/2025 100  98 - 107 mmol/L Final    Bicarbonate 01/06/2025 32  21 - 32 mmol/L Final    Anion Gap 01/06/2025 11  10 - 20 mmol/L Final    Urea Nitrogen 01/06/2025 21  6 - 23 mg/dL Final    Creatinine 01/06/2025 0.77  0.50 - 1.05 mg/dL Final    eGFR 01/06/2025 82  >60 mL/min/1.73m*2 Final    Calculations of estimated GFR are performed using the 2021 CKD-EPI Study Refit equation without the race variable for the IDMS-Traceable creatinine methods.  https://jasn.asnjournals.org/content/early/2021/09/22/ASN.9685078407    Calcium 01/06/2025 10.5 (H)  8.6 - 10.3 mg/dL Final    Albumin 01/06/2025 3.7  3.4 - 5.0 g/dL Final    Alkaline Phosphatase 01/06/2025 59  33 - 136 U/L Final    Total Protein 01/06/2025 6.5  6.4 - 8.2 g/dL Final    AST 01/06/2025 20  9 - 39 U/L Final    Bilirubin, Total 01/06/2025 0.8  0.0 - 1.2 mg/dL Final    ALT 01/06/2025 15  7 - 45 U/L Final    Patients treated with Sulfasalazine may generate falsely decreased results for ALT.     Cholesterol 01/06/2025 186  0 - 199 mg/dL Final          Age      Desirable   Borderline High   High     0-19 Y     0 - 169       170 - 199     >/= 200    20-24 Y     0 - 189       190 - 224     >/= 225         >24 Y     0 - 199       200 - 239     >/= 240   **All ranges are based on fasting samples. Specific   therapeutic targets will vary based on patient-specific   cardiac risk.    Pediatric guidelines reference:Pediatrics 2011, 128(S5).Adult guidelines reference: NCEP ATPIII Guidelines,ASHLEIGH 2001, 258:2486-97    Venipuncture immediately after or during the administration of Metamizole may lead to falsely low results. Testing should be performed immediately prior to Metamizole dosing.    HDL-Cholesterol 01/06/2025 38.3  mg/dL Final      Age       Very Low   Low     Normal    High    0-19 Y    < 35      < 40     40-45     ----  20-24 Y    ----     < 40      >45      ----        >24 Y      ----     < 40     40-60      >60      Cholesterol/HDL Ratio 01/06/2025 4.9   Final      Ref Values  Desirable  < 3.4  High Risk  > 5.0    LDL Calculated 01/06/2025 102 (H)  <=99 mg/dL Final                                Near   Borderline      AGE      Desirable  Optimal    High     High     Very High     0-19 Y     0 - 109     ---    110-129   >/= 130     ----    20-24 Y     0 - 119     ---    120-159   >/= 160     ----      >24 Y     0 -  99   100-129  130-159   160-189     >/=190      VLDL 01/06/2025 46 (H)  0 - 40 mg/dL Final    Triglycerides 01/06/2025 228 (H)  0 - 149 mg/dL Final    Age              Desirable        Borderline         High        Very High  SEX:B           mg/dL             mg/dL               mg/dL      mg/dL  <=14D                       ----               ----        ----  15D-365D                    ----               ----        ----  1Y-9Y           0-74               75-99             >=100       ----  10Y-19Y        0-89                            >=130       ----  20Y-24Y         0-114             115-149             >=150      ----  >= 25Y         0-149             150-199             200-499    >=500      Venipuncture immediately after or during the administration of Metamizole may lead to falsely low results. Testing should be performed immediately prior to Metamizole dosing.    Non HDL Cholesterol 01/06/2025 148  0 - 149 mg/dL Final          Age       Desirable   Borderline High   High     Very High     0-19 Y     0 - 119       120 - 144     >/= 145    >/= 160    20-24 Y     0 - 149       150 - 189     >/= 190      ----         >24 Y    30 mg/dL above LDL Cholesterol goal      WBC 01/06/2025 5.6  4.4 - 11.3 x10*3/uL Final    nRBC 01/06/2025 0.0  0.0 - 0.0 /100 WBCs Final    RBC 01/06/2025 4.19  4.00 - 5.20 x10*6/uL Final    Hemoglobin 01/06/2025 12.7  12.0 - 16.0 g/dL Final    Hematocrit 01/06/2025 37.2  36.0 - 46.0 % Final    MCV 01/06/2025 89  80 - 100 fL Final    MCH 01/06/2025 30.3  26.0 - 34.0 pg Final    MCHC 01/06/2025 34.1  32.0 - 36.0 g/dL Final    RDW 01/06/2025 14.1  11.5 - 14.5 % Final    Platelets 01/06/2025 172  150 - 450 x10*3/uL Final    Hemoglobin A1C 01/06/2025 6.6 (H)  See comment % Final    Estimated Average Glucose 01/06/2025 143  Not Established mg/dL Final    Uric Acid 01/06/2025 8.0 (H)  2.3 - 6.7 mg/dL Final    Venipuncture immediately after or during the administration of Metamizole may lead to falsely low results. Testing should be performed immediately  prior to Metamizole dosing.    Albumin, Urine Random 01/06/2025 203.5  Not established mg/L Final    Creatinine, Urine Random 01/06/2025 79.5  20.0 - 320.0 mg/dL Final    Albumin/Creatinine Ratio 01/06/2025 256.0 (H)  <30.0 ug/mg Creat Final   Admission on 11/22/2024, Discharged on 11/22/2024   Component Date Value Ref Range Status    POCT Glucose 11/22/2024 107 (H)  74 - 99 mg/dL Final    POCT Glucose 11/22/2024 106 (H)  74 - 99 mg/dL Final   Lab Requisition on 11/15/2024   Component Date Value Ref Range  Status    ABO TYPE 11/15/2024 A   Final    Rh TYPE 11/15/2024 NEG   Final    ANTIBODY SCREEN 11/15/2024 NEG   Final   Lab on 11/15/2024   Component Date Value Ref Range Status    WBC 11/15/2024 8.2  4.4 - 11.3 x10*3/uL Final    nRBC 11/15/2024 0.0  0.0 - 0.0 /100 WBCs Final    RBC 11/15/2024 4.66  4.00 - 5.20 x10*6/uL Final    Hemoglobin 11/15/2024 14.0  12.0 - 16.0 g/dL Final    Hematocrit 11/15/2024 41.4  36.0 - 46.0 % Final    MCV 11/15/2024 89  80 - 100 fL Final    MCH 11/15/2024 30.0  26.0 - 34.0 pg Final    MCHC 11/15/2024 33.8  32.0 - 36.0 g/dL Final    RDW 11/15/2024 14.1  11.5 - 14.5 % Final    Platelets 11/15/2024 246  150 - 450 x10*3/uL Final    Glucose 11/15/2024 115 (H)  74 - 99 mg/dL Final    Sodium 11/15/2024 136  136 - 145 mmol/L Final    Potassium 11/15/2024 4.2  3.5 - 5.3 mmol/L Final    Chloride 11/15/2024 100  98 - 107 mmol/L Final    Bicarbonate 11/15/2024 31  21 - 32 mmol/L Final    Anion Gap 11/15/2024 9 (L)  10 - 20 mmol/L Final    Urea Nitrogen 11/15/2024 28 (H)  6 - 23 mg/dL Final    Creatinine 11/15/2024 0.93  0.50 - 1.05 mg/dL Final    eGFR 11/15/2024 65  >60 mL/min/1.73m*2 Final    Calculations of estimated GFR are performed using the 2021 CKD-EPI Study Refit equation without the race variable for the IDMS-Traceable creatinine methods.  https://jasn.asnjournals.org/content/early/2021/09/22/ASN.5173962781    Calcium 11/15/2024 9.2  8.6 - 10.3 mg/dL Final    Hemoglobin A1C 11/15/2024 7.1 (H)  See comment % Final    Estimated Average Glucose 11/15/2024 157  Not Established mg/dL Final   Pre-Admission Testing on 11/15/2024   Component Date Value Ref Range Status    Ventricular Rate 11/15/2024 73  BPM Final    Atrial Rate 11/15/2024 73  BPM Final    MD Interval 11/15/2024 176  ms Final    QRS Duration 11/15/2024 126  ms Final    QT Interval 11/15/2024 430  ms Final    QTC Calculation(Bazett) 11/15/2024 473  ms Final    P Axis 11/15/2024 77  degrees Final    R Axis 11/15/2024 -41  degrees  Final    T Axis 11/15/2024 -35  degrees Final    QRS Count 11/15/2024 12  beats Final    Q Onset 11/15/2024 214  ms Final    P Onset 11/15/2024 126  ms Final    P Offset 11/15/2024 178  ms Final    T Offset 11/15/2024 429  ms Final    QTC Fredericia 11/15/2024 459  ms Final   Office Visit on 10/30/2024   Component Date Value Ref Range Status    Case Report 10/30/2024    Final                    Value:Surgical Pathology                                Case: [id number]                                  Authorizing Provider:  Adina Navarro MD MPH       Collected:           10/30/2024 1430              Ordering Location:     [facility]   Received:            10/30/2024 1430                                                                                                                Pathologist:           Christiana Chatman MD                                                           Specimens:   A) - VULVA BIOPSY                                                                                   B) - VULVA BIOPSY                                                                          FINAL DIAGNOSIS 10/30/2024    Final                    Value:A. VULVA, RIGHT PERICLITORAL, BIOPSY:   -- Granulation tissue    B. VULVA, LEFT INFERIOR MEDIAL LABIAL, BIOPSY:   -- Squamous mucosa with acanthosis, hypergranulosis, hyperkeratosis and parakeratosis in a background suggestive of lichen sclerosus          10/30/2024    Final                    Value:By the signature on this report, the individual or group listed as making the Final Interpretation/Diagnosis certifies that they have reviewed this case.       Comment 10/30/2024    Final                    Value:Deeper levels were examined.    This case / Select slides (part B) were shown at the [facility] GYN consensus conference via Zoom on 11/6/2024.  Attending: HAWA Murphy, MERRY Mendoza R. Redline.        Clinical History 10/30/2024    Final         "            Value:vulvar lesion    Gross Description 10/30/2024    Final                    Value:A: Received in formalin, labeled with the patient's name and hospital number, is 1 fragment of tan-brown, soft tissue aggregating to 0.6 x 0.6 x 0.5 cm. The specimen is submitted in toto in one cassette.  JEK/SBS  B: Received in formalin, labeled with the patient's name and hospital number, are 2 fragments of tan-brown, soft tissue aggregating to 1.0 x 0.5 x 0.3 cm. The specimen is submitted in toto in one cassette.  JEK/SBS      Disclaimer 10/30/2024    Final                    Value:One or more of the reagents used to perform assays on this specimen MAY have contained components considered to be analyte specific reagents (ASR's).  ASR's have not been cleared or approved by the U.S. Food and Drug Administration.  These assays were developed and their performance characteristics determined by the Department of Pathology at [facility]. The FDA does not require this test to go through premarket FDA review. This test is used for clinical purposes. It should not be regarded as investigational or for research. This laboratory is certified under the Clinical Laboratory Improvement Amendments (CLIA) as qualified to perform high complexity clinical laboratory testing.  The assays were performed with appropriate positive and negative controls which stained appropriately.          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 (Multi)    Essential hypertension    Gout    Lichen sclerosus    Type 2 diabetes mellitus with hyperglycemia, with long-term current use of insulin    Relevant Medications    pen needle, diabetic 31 gauge x 3/16\" needle    Other Relevant Orders    Comprehensive Metabolic Panel    Hemoglobin A1C    CBC and Auto Differential    Mixed hyperlipidemia    Portal hypertension (Multi) - Primary     Other Visit " Diagnoses       Encounter for Medicare annual wellness exam        Body mass index (BMI) 40.0-44.9, adult (Multi)        Allergy to statin medication              Advance Care Planning Note   Discussion Date: 01/13/25   Discussion Participants: patient  16 min spent discussing ACP w pt    The patient wishes to discuss Advance Care Planning today and the following is a brief summary of our discussion.     Patient has capacity to make their own medical decisions: Yes  Health Care Agent/Surrogate Decision Maker documented in chart: Yes  No depression  Just as independent as last yr    Documents on file and valid:  Advance Directive/Living Will: Yes advised to bring    Health Care Power of : Yes or advised today  Communication of Medical Status/Prognosis:   yes   Communication of Treatment Goals/Options:   yes  Treatment Decisions/involved with patient today  yes  Time Statement: Total face to face time spent on advance care planning was <30 minutes with <30 minutes spent in counseling, including the explanation.    SEE ME AT NEXT REGULARLY SCHEDULED VISIT-sooner if condition deteriorates or new problems arise.    PATIENT WOULD LIKE TO BE A FULL CODE    NO uncontrolled DEPRESSION noted  Assessed and reviewed for opioid use.  NO EVIDENCE OF SIGNIFICANT DEMENTIA  And again had a lengthy discussion w pt  about risks of poorly controlled diabetes including micro and macrovascular complications of DM2 including blindness,MI,CVA and death among other possibilities. Pt aware and agrees to better -or if good control-continued compliance and adherance to instructions such as regular eye exams q 1-2 y, foot exams,and f/u regularly for hba1c with a goal of 6.5.    Follow up as planned for hba1c and BP checks REGULARLY.  Hba1c 6.6  Ldl 102  Uric acid 8  Only gets gout 3/yr episodes  Colcrys helps  Rare use needed  Signs and symptoms of concern with depression-if in crisis -(no current HI/SI) will let us know and proceed to  ER   Signs/symptoms of concern with dementia-and need to contact us if they occur discussed.    ASCVD 34.5   %   addressed and risk reduction conversation took place  Statin allergy  Declines cardiology consult  Rba addressed    All above counseling 15 minutes in conversation/documentation etc    Mood counseling 5min- no uncontrolled depression, good support system, has crisis plan if occurs.  Included BMI counseling and options if BMI>30    Still not path yet on  bx  Sees gyn      QUESTIONS answered        Next visit addressed -regular visit as scheduled and follow up sooner if condition deterioration or new problems arise.    This completes Vanessa Son ANNUAL MEDICARE WELLNESS VISIT today.  If no other follow ups discussed: Please follow up in 1 year for NEXT ANNUAL MEDICARE WELLNESS VISIT.    Lalitha Ramirez MD    This documentation is subject to inadvertent typing and other similar clerical/grammatic errors etc.

## 2025-01-15 ENCOUNTER — TELEPHONE (OUTPATIENT)
Dept: PHARMACY | Facility: HOSPITAL | Age: 75
End: 2025-01-15
Payer: MEDICARE

## 2025-01-20 ENCOUNTER — TELEPHONE (OUTPATIENT)
Dept: PHARMACY | Facility: HOSPITAL | Age: 75
End: 2025-01-20
Payer: MEDICARE

## 2025-01-20 NOTE — TELEPHONE ENCOUNTER
Spoke to patient today - unfortunately, insurance denied prior authorization request for brand name Victoza because insurance covers generic Victoza. Advised patient she must try generic first before can attempt PA for brand name. Patient confirms has about 4 months left of therapy, therefore will call PCP's office in a few months when ready for generic Victoza prescription.    Thank you,  Esme Mejia, PharmD

## 2025-01-27 DIAGNOSIS — C51.9 VULVAR CANCER (MULTI): Primary | ICD-10-CM

## 2025-01-27 PROCEDURE — 0761T DGTZ GLS MCRSCP SL IMM EA 1: CPT | Mod: TC,SUR | Performed by: STUDENT IN AN ORGANIZED HEALTH CARE EDUCATION/TRAINING PROGRAM

## 2025-01-30 LAB
LAB AP ASR DISCLAIMER: NORMAL
LABORATORY COMMENT REPORT: NORMAL
PATH REPORT.ADDENDUM SPEC: NORMAL
PATH REPORT.COMMENTS IMP SPEC: NORMAL
PATH REPORT.FINAL DX SPEC: NORMAL
PATH REPORT.GROSS SPEC: NORMAL
PATH REPORT.RELEVANT HX SPEC: NORMAL
PATH REPORT.TOTAL CANCER: NORMAL

## 2025-01-30 PROCEDURE — 88342 IMHCHEM/IMCYTCHM 1ST ANTB: CPT | Performed by: STUDENT IN AN ORGANIZED HEALTH CARE EDUCATION/TRAINING PROGRAM

## 2025-01-30 PROCEDURE — 88341 IMHCHEM/IMCYTCHM EA ADD ANTB: CPT | Mod: TC,SUR | Performed by: STUDENT IN AN ORGANIZED HEALTH CARE EDUCATION/TRAINING PROGRAM

## 2025-01-30 NOTE — TUMOR BOARD NOTE
Gynecologic Oncology Tumor Board 2025         Vanessa Son  74 y.o.    1950  MRN [id number]    Provider: Adina Navarro MD  Disease Site: Vulvar  Pathology: SCC of the vulva, sarcomatoid pattern, HPV independent   Grade: N/A   Stage: at least IB    We reviewed previous medical and familial history, history of present illness, and recent lab results along with all available histopathologic and imaging studies. The tumor board considered available treatment options and made the following recommendations.    Recommendations:   Radical excision, b/l groin evaluation           Clinical Trial Consideration: None Available    National site-specific guidelines were discussed with respect to the case. It is recognized that there may be additional factors not discussed in the Review Board discussion that can influence management decisions, and alternative management options which fall within the standard of care. Accordingly the final treatment disposition will be determined by the patient, in the context of an informed discussion with their providers. The actual prescribed management or treatment plan may or may not be consistent with the Review Board recommendations.

## 2025-01-31 ENCOUNTER — PREP FOR PROCEDURE (OUTPATIENT)
Dept: OPERATING ROOM | Facility: HOSPITAL | Age: 75
End: 2025-01-31

## 2025-01-31 ENCOUNTER — TUMOR BOARD CONFERENCE (OUTPATIENT)
Dept: HEMATOLOGY/ONCOLOGY | Facility: HOSPITAL | Age: 75
End: 2025-01-31
Payer: MEDICARE

## 2025-01-31 ENCOUNTER — TELEMEDICINE (OUTPATIENT)
Dept: GYNECOLOGIC ONCOLOGY | Facility: HOSPITAL | Age: 75
End: 2025-01-31
Payer: MEDICARE

## 2025-01-31 DIAGNOSIS — C51.9 VULVAR CANCER (MULTI): Primary | ICD-10-CM

## 2025-01-31 PROCEDURE — 3060F POS MICROALBUMINURIA REV: CPT | Performed by: STUDENT IN AN ORGANIZED HEALTH CARE EDUCATION/TRAINING PROGRAM

## 2025-01-31 PROCEDURE — 3049F LDL-C 100-129 MG/DL: CPT | Performed by: STUDENT IN AN ORGANIZED HEALTH CARE EDUCATION/TRAINING PROGRAM

## 2025-01-31 PROCEDURE — 1160F RVW MEDS BY RX/DR IN RCRD: CPT | Performed by: STUDENT IN AN ORGANIZED HEALTH CARE EDUCATION/TRAINING PROGRAM

## 2025-01-31 PROCEDURE — 99214 OFFICE O/P EST MOD 30 MIN: CPT | Performed by: STUDENT IN AN ORGANIZED HEALTH CARE EDUCATION/TRAINING PROGRAM

## 2025-01-31 PROCEDURE — 1159F MED LIST DOCD IN RCRD: CPT | Performed by: STUDENT IN AN ORGANIZED HEALTH CARE EDUCATION/TRAINING PROGRAM

## 2025-01-31 PROCEDURE — 3044F HG A1C LEVEL LT 7.0%: CPT | Performed by: STUDENT IN AN ORGANIZED HEALTH CARE EDUCATION/TRAINING PROGRAM

## 2025-01-31 RX ORDER — GABAPENTIN 600 MG/1
600 TABLET ORAL ONCE
OUTPATIENT
Start: 2025-01-31 | End: 2025-01-31

## 2025-01-31 RX ORDER — CELECOXIB 200 MG/1
400 CAPSULE ORAL ONCE
OUTPATIENT
Start: 2025-01-31 | End: 2025-01-31

## 2025-01-31 RX ORDER — HEPARIN SODIUM 5000 [USP'U]/ML
5000 INJECTION, SOLUTION INTRAVENOUS; SUBCUTANEOUS ONCE
OUTPATIENT
Start: 2025-01-31 | End: 2025-01-31

## 2025-01-31 RX ORDER — ACETAMINOPHEN 325 MG/1
975 TABLET ORAL ONCE
OUTPATIENT
Start: 2025-01-31 | End: 2025-01-31

## 2025-01-31 NOTE — PROGRESS NOTES
Patient ID: Jodie Son is a 74 y.o. female.  Referring Physician: No referring provider defined for this encounter.  Primary Care Provider: Lalitha Ramirez MD    Virtual or Telephone Consent    A telephone visit (audio only) between the patient (at the originating site) and the provider (at the distant site) was utilized to provide this telehealth service.   Verbal consent was requested and obtained from Vanessa Son on this date, 01/31/25 for a telehealth visit.       Subjective    Here on follow up to discuss pathology. She is finally feeling better. Bleeding has resolved. Pain has resolved. Voiding without difficulty. Energy finally improving.     Objective    BSA: There is no height or weight on file to calculate BSA.  LMP  (LMP Unknown)      Physical Exam  Constitutional:       General: She is not in acute distress.        Performance Status:  Asymptomatic    Assessment/Plan     Oncology History Overview Note   10/30/24 R vulva  granulation tissue, L vulva LS  11/22/24 L vulvectomy invasive SCC, sarcomatoid pattern, dVIN, LS positive margins at 12 and 6 o'clock and within 1 mm of of the deep margin with at least 5 mm DOI consistent with stage IB, R  vulvectomy foci suspicious for superficially invasive SCC approximately 1 mm, HP independent, dVIN and LS  Tumor board reexcision L vulva, bilateral inguinofemoral SLND            Diagnoses and all orders for this visit:  Vulvar cancer (Multi)  # Vulvar cancer  - Discussed pathogenesis of vulvar cancer, we discussed hers is not due to HPV but is due to vulvar dermatoses   - Reviewed pathology which reveals a vulvar cancer, specifically at least stage IB SCC with positive margins  - We discussed my examination findings and my recommendation for re-excision with L modified radical vulvectomy bilateral sentinel lymph node dissection  - We briefly discussed management of vulvar cancer including surgical and non-surgical management  - Discussed expected  risks of the procedure, hospital stay, and expected recovery  - She will need PAT.  - Insurance mandate inpatient stay  - Consent to be signed DOS  - Groin US to evaluate for lymphadenopathy     Treatment Plans       No treatment plans exist          Adina Navarro MD MPH

## 2025-02-03 ENCOUNTER — HOSPITAL ENCOUNTER (OUTPATIENT)
Dept: RADIOLOGY | Facility: HOSPITAL | Age: 75
Discharge: HOME | End: 2025-02-03
Payer: MEDICARE

## 2025-02-03 DIAGNOSIS — C51.9 VULVAR CANCER (MULTI): ICD-10-CM

## 2025-02-03 PROCEDURE — 76882 US LMTD JT/FCL EVL NVASC XTR: CPT | Performed by: RADIOLOGY

## 2025-02-03 PROCEDURE — 76770 US EXAM ABDO BACK WALL COMP: CPT

## 2025-02-07 ENCOUNTER — APPOINTMENT (OUTPATIENT)
Dept: OBSTETRICS AND GYNECOLOGY | Facility: CLINIC | Age: 75
End: 2025-02-07
Payer: MEDICARE

## 2025-03-11 ENCOUNTER — PRE-ADMISSION TESTING (OUTPATIENT)
Dept: PREADMISSION TESTING | Facility: HOSPITAL | Age: 75
End: 2025-03-11
Payer: MEDICARE

## 2025-03-11 VITALS
BODY MASS INDEX: 40.12 KG/M2 | OXYGEN SATURATION: 98 % | DIASTOLIC BLOOD PRESSURE: 74 MMHG | HEIGHT: 64 IN | SYSTOLIC BLOOD PRESSURE: 145 MMHG | TEMPERATURE: 97.3 F | WEIGHT: 235 LBS | HEART RATE: 67 BPM

## 2025-03-11 DIAGNOSIS — C51.9 VULVAR CANCER (MULTI): ICD-10-CM

## 2025-03-11 DIAGNOSIS — Z01.818 PREOPERATIVE EXAMINATION: Primary | ICD-10-CM

## 2025-03-11 DIAGNOSIS — R60.0 BILATERAL LOWER EXTREMITY EDEMA: ICD-10-CM

## 2025-03-11 LAB
ABO GROUP (TYPE) IN BLOOD: NORMAL
ANION GAP SERPL CALC-SCNC: 12 MMOL/L (ref 10–20)
ANTIBODY SCREEN: NORMAL
BUN SERPL-MCNC: 26 MG/DL (ref 6–23)
CALCIUM SERPL-MCNC: 9.9 MG/DL (ref 8.6–10.6)
CHLORIDE SERPL-SCNC: 101 MMOL/L (ref 98–107)
CO2 SERPL-SCNC: 31 MMOL/L (ref 21–32)
CREAT SERPL-MCNC: 0.83 MG/DL (ref 0.5–1.05)
EGFRCR SERPLBLD CKD-EPI 2021: 74 ML/MIN/1.73M*2
ERYTHROCYTE [DISTWIDTH] IN BLOOD BY AUTOMATED COUNT: 13.5 % (ref 11.5–14.5)
GLUCOSE SERPL-MCNC: 107 MG/DL (ref 74–99)
HCT VFR BLD AUTO: 38.8 % (ref 36–46)
HGB BLD-MCNC: 13.3 G/DL (ref 12–16)
MCH RBC QN AUTO: 30.4 PG (ref 26–34)
MCHC RBC AUTO-ENTMCNC: 34.3 G/DL (ref 32–36)
MCV RBC AUTO: 89 FL (ref 80–100)
NRBC BLD-RTO: 0 /100 WBCS (ref 0–0)
PLATELET # BLD AUTO: 166 X10*3/UL (ref 150–450)
POTASSIUM SERPL-SCNC: 4.1 MMOL/L (ref 3.5–5.3)
RBC # BLD AUTO: 4.37 X10*6/UL (ref 4–5.2)
RH FACTOR (ANTIGEN D): NORMAL
SODIUM SERPL-SCNC: 140 MMOL/L (ref 136–145)
WBC # BLD AUTO: 5.3 X10*3/UL (ref 4.4–11.3)

## 2025-03-11 PROCEDURE — 80048 BASIC METABOLIC PNL TOTAL CA: CPT | Performed by: STUDENT IN AN ORGANIZED HEALTH CARE EDUCATION/TRAINING PROGRAM

## 2025-03-11 PROCEDURE — 99204 OFFICE O/P NEW MOD 45 MIN: CPT

## 2025-03-11 PROCEDURE — 86900 BLOOD TYPING SEROLOGIC ABO: CPT | Performed by: STUDENT IN AN ORGANIZED HEALTH CARE EDUCATION/TRAINING PROGRAM

## 2025-03-11 PROCEDURE — 85027 COMPLETE CBC AUTOMATED: CPT | Performed by: STUDENT IN AN ORGANIZED HEALTH CARE EDUCATION/TRAINING PROGRAM

## 2025-03-11 ASSESSMENT — ENCOUNTER SYMPTOMS
TROUBLE SWALLOWING: 0
SINUS CONGESTION: 0
NUMBNESS: 0
MYALGIAS: 0
JOINT SWELLING: 1
PND: 0
NECK STIFFNESS: 0
COUGH: 0
NECK MASS: 0
NERVOUS/ANXIOUS: 0
PALPITATIONS: 0
ABDOMINAL PAIN: 0
RHINORRHEA: 0
DYSPNEA AT REST: 0
SKIN CHANGES: 0
VOMITING: 0
EXCESSIVE BLEEDING: 0
ARTHRALGIAS: 0
HEMOPTYSIS: 0
WEAKNESS: 0
DOUBLE VISION: 0
DYSPNEA WITH EXERTION: 0
ABDOMINAL DISTENTION: 0
FEVER: 0
TREMORS: 0
SHORTNESS OF BREATH: 0
WHEEZING: 0
LIMITED RANGE OF MOTION: 0
NECK PAIN: 0
NECK SWELLING: 0
POLYDIPSIA: 0
CONSTIPATION: 0
CHILLS: 0
LIGHT-HEADEDNESS: 0
DIARRHEA: 0
DIFFICULTY URINATING: 0
DYSURIA: 0
WOUND: 0
VISUAL CHANGE: 0
CONFUSION: 0
NAUSEA: 0
VERTIGO: 0
BLOOD IN STOOL: 0
VOICE CHANGE: 0
UNEXPECTED WEIGHT CHANGE: 0
EYE DISCHARGE: 0
BRUISES/BLEEDS EASILY: 0

## 2025-03-11 ASSESSMENT — LIFESTYLE VARIABLES: SMOKING_STATUS: NONSMOKER

## 2025-03-11 ASSESSMENT — DUKE ACTIVITY SCORE INDEX (DASI)
CAN YOU DO LIGHT WORK AROUND THE HOUSE LIKE DUSTING OR WASHING DISHES: YES
CAN YOU PARTICIPATE IN MODERATE RECREATIONAL ACTIVITIES LIKE GOLF, BOWLING, DANCING, DOUBLES TENNIS OR THROWING A BASEBALL OR FOOTBALL: NO
CAN YOU WALK INDOORS, SUCH AS AROUND YOUR HOUSE: YES
CAN YOU HAVE SEXUAL RELATIONS: NO
CAN YOU DO HEAVY WORK AROUND THE HOUSE LIKE SCRUBBING FLOORS OR LIFTING AND MOVING HEAVY FURNITURE: NO
CAN YOU TAKE CARE OF YOURSELF (EAT, DRESS, BATHE, OR USE TOILET): YES
DASI METS SCORE: 5.1
CAN YOU PARTICIPATE IN STRENOUS SPORTS LIKE SWIMMING, SINGLES TENNIS, FOOTBALL, BASKETBALL, OR SKIING: NO
CAN YOU WALK A BLOCK OR TWO ON LEVEL GROUND: YES
CAN YOU CLIMB A FLIGHT OF STAIRS OR WALK UP A HILL: YES
CAN YOU DO MODERATE WORK AROUND THE HOUSE LIKE VACUUMING, SWEEPING FLOORS OR CARRYING GROCERIES: YES
CAN YOU RUN A SHORT DISTANCE: NO
TOTAL_SCORE: 18.95
CAN YOU DO YARD WORK LIKE RAKING LEAVES, WEEDING OR PUSHING A MOWER: NO
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
## 2025-03-11 NOTE — PREPROCEDURE INSTRUCTIONS
Thank you for visiting [facility] ([facility]) today for your pre-procedure evaluation, you were seen by     Sarah Beth Randall PA-C   Department of Anesthesiology and Perioperative Medicine  Main phone [phone]  Fax [phone]      This summary includes instructions and information to aid you during your perioperative period.  Please read carefully. If you have any questions about your visit today, please call the number listed above.  If you become ill or have any changes to your health before your surgery, please contact your primary care provider and alert your surgeon.    Preparing for Surgery       Preoperative Fasting Guidelines    Why must I stop eating and drinking near surgery time?  With sedation, food or liquid in your stomach can enter your lungs causing serious complications  Food can increase nausea and vomiting  When do I need to stop eating and drinking before my surgery?  Do not eat any food after midnight the night before your surgery/procedure.  You may have up to 13.5 ounces of clear liquid until TWO hours before your instructed arrival time to the hospital.  This includes water, black tea/coffee, (no milk or cream) apple juice, and electrolyte drinks (Gatorade)  You may chew gum until TWO hours before your surgery/procedure    Tobacco and Alcohol;  Do not drink alcohol or smoke within 24 hours of surgery.  It is best to quit smoking for as long as possible before any surgery or procedure.      The Week before Surgery        Seven days before Surgery  Check your CPM medication instructions  Do the exercises provided to you by [facility]   Arrange for a responsible, adult licensed  to take you home after surgery and stay with you for 24 hours.  You will not be permitted to drive yourself home if you have received any anesthetic/sedation  Five days before surgery  Check your CPM medication instructions  Do the exercises provided to you by [facility]   Start using Chlorhexidene  (CHG) body wash if prescribed (Continue till day of surgery)      The Day before Surgery       Check your CPM medication and all other CPM instructions including when to stop eating and drinking  You will be called with your arrival time for surgery in the late afternoon.  If you do not receive a call please reach out to your surgeon's office.  Do not smoke or drink 24 hours before surgery  Prepare items to bring with you to the hospital  Shower with your chlorhexidine wash if prescribed  Brush your teeth and use your chlorhexidine dental rinse if prescribed    The Day of Surgery       Check your CPM medication instructions  Ensure you follow the instructions for when to stop eating and drinking  Shower, if prescribed use CHG.  Do not apply any lotions, creams, moisturizers, perfume or deodorant  Brush your teeth and use your CHG dental rinse if prescribed  Wear loose comfortable clothing  Avoid make-up  Remove  jewelry and piercings, consider professional piercing removal with a plastic spacer if needed  Bring photo ID and Insurance card  Bring an accurate medication list that includes medication dose, frequency and allergies  Bring a copy of your advanced directives (will, health care power of )  Bring any devices and controllers as well as medical devices you have been provided with for surgery (CPAP, slings, braces, etc.)  Dentures, eyeglasses, and contacts will be removed before surgery, please bring cases for contacts or glasses    Preoperative Deep Breathing Exercises    Why it is important to do deep breathing exercises before my surgery?  Deep breathing exercises strengthen your breathing muscles.  This helps you to recover after your surgery and decreases the chance of breathing complications.    How are the deep breathing exercises done?  Sit straight with your back supported.  Breathe in deeply and slowly through your nose. Your lower rib cage should expand and your abdomen may move  forward.  Hold that breath for 3 to 5 seconds.  Breathe out through pursed lips, slowly and completely.  Rest and repeat 10 times every hour while awake.  Rest longer if you become dizzy or lightheaded.      Preoperative Brain Exercises    What are brain exercises?  A brain exercise is any activity that engages your thinking (cognitive) skills.    What types of activities are considered brain exercises?  Jigsaw puzzles, crossword puzzles, word jumble, memory games, word search, and many more.  Many can be found free online or on your phone via a mobile loy.    Why should I do brain exercises before my surgery?  More recent research has shown brain exercise before surgery can lower the risk of postoperative delirium (confusion) which can be especially important for older adults.  Patients who did brain exercises for 5 to 10 hours the days before surgery, cut their risk of postoperative delirium in half up to 1 week after surgery.    Sit-to-Stand Exercise    What is the sit-to-stand exercise?  The sit-to-stand exercise strengthens the muscles of your lower body and muscles in the center of your body (core muscles for stability) helping to maintain and improve your strength and mobility.  How do I do the sit-to-stand exercise?  The goal is to do this exercise without using your arms or hands.  If this is too difficult, use your arms and hands or a chair with armrests to help slowly push yourself to the standing position and lower yourself back to the sitting position. As the movement becomes easier use your arms and hands less.    Steps to the sit-to-stand exercise  Sit up tall in a sturdy chair, knees bent, feet flat on the floor shoulder-width apart.  Shift your hips/pelvis forward in the chair to correctly position yourself for the next movement.  Lean forward at your hips.  Stand up straight putting equal weight on both feet.  Check to be sure you are properly aligned with the chair, in a slow controlled movement  sit back down.  Repeat this exercise 10-15 times.  If needed you can do it fewer times until your strength improves.  Rest for 1 minute.  Do another 10-15 sit-to-stand exercises.  Try to do this in the morning and evening.       Simple things you can do to help prevent blood clots     Blood clots are blockages that can form in the body's veins. When a blood clot forms in your deep veins, it may be called a deep vein thrombosis, or DVT for short. Blood clots can happen in any part of the body where blood flows, but they are most common in the arms and legs. If a piece of a blood clot breaks free and travels to the lungs, it is called a pulmonary embolus (PE). A PE can be a very serious problem.      Being in the hospital or having surgery can raise your chances of getting a blood clot because you may not be well enough to move around as much as you normally do.         Ways you can help prevent blood clots in the hospital       Wearing SCDs  SCDs stands for Sequential Compression Devices.   SCDs are special sleeves that wrap around your legs. They attach to a pump that fills them with air to gently squeeze your legs every few minutes.  This helps return the blood in your legs to your heart.   SCDs should only be taken off when walking or bathing. SCDs may not be comfortable, but they can help save your life.              Pump SCD leg sleeves  Wearing compression stockings - if your doctor orders them. These special snug-fitting stockings gently squeeze your legs to help blood flow.       Walking. Walking helps move the blood in your legs.   If your doctor says it is ok, try walking the halls at least   5 times a day. Ask us to help you get up, so you don't fall.      Taking any blood-thinning medicines your doctor orders.              Ways you can help prevent blood clots at home         Wearing compression stockings - if your doctor orders them.   Walking - to help move the blood in your legs.    Taking any  blood-thinning medicines your doctor orders.      Signs of a blood clot or PE    Tell your doctor or nurse right away if you have any of the problems listed below.         If you are at home, seek medical care right away. Call 911 for chest pain or problems breathing.            Signs of a blood clot (DVT) - such as pain, swelling, redness, or warmth in your arm or legs.  Signs of a pulmonary embolism (PE) - such as chest pain or feeling short of breath

## 2025-03-13 NOTE — HOSPITAL COURSE
Patient was admitted on 3/18 for scheduled surgery. She underwent a L radical vulvectomy, R simple vulvectomy, bilateral inguinofemoral SLNDBx. Her procedure was uncomplicated, see the operative report for full details. By POD1 she was meeting all postoperative milestones including: tolerating PO diet and medications, voiding, ambulating. Her pain was well controlled with PO pain medications. She was appropriate for discharge home and will follow up as an outpatient with Dr. Navarro .

## 2025-03-17 ENCOUNTER — ANESTHESIA EVENT (OUTPATIENT)
Dept: OPERATING ROOM | Facility: HOSPITAL | Age: 75
End: 2025-03-17
Payer: MEDICARE

## 2025-03-17 NOTE — PROGRESS NOTES
"Pharmacy Medication History Review    Vanessa Son \"Jodie\" is a 75 y.o. female who is planned to be admitted for Vulvar cancer (Multi). Pharmacy called the patient prior to their scheduled procedure and reviewed the patient's luyal-md-dvylmlauj medications for accuracy.    Medications ADDED:  none  Medications CHANGED:  none  Medications REMOVED:   Colchicine 0.6mg - not taking at this time - takes PRN for gout flare-up    Please review updated prior to admission medication list and comments regarding how patient may be taking medications differently by going to Admission tab --> Admission Orders --> Admit Orders / Review prior to admission medications.     Preferred pharmacy, last doses of medications, and allergies to be confirmed with patient by nursing the day of procedure.     Sources used to complete the med history include:  [facility]  Pharmacy dispense history  Patient Interview Good historian  Chart Review  Care Everywhere     Below are additional concerns with the patient's PTA list.  Patient states they take colchicine 0.6mg daily as needed when having a gout flare-up. Does NOT take every day and is NOT taking at this time. MARIALUISA 10/16/24 #90/90d    Camille Barros [facility]  Please reach out via Secure Chat for questions   "

## 2025-03-18 ENCOUNTER — HOSPITAL ENCOUNTER (OUTPATIENT)
Dept: RADIOLOGY | Facility: HOSPITAL | Age: 75
Setting detail: SURGERY ADMIT
Discharge: HOME | End: 2025-03-18
Payer: MEDICARE

## 2025-03-18 ENCOUNTER — HOSPITAL ENCOUNTER (INPATIENT)
Facility: HOSPITAL | Age: 75
LOS: 1 days | Discharge: HOME | End: 2025-03-19
Attending: STUDENT IN AN ORGANIZED HEALTH CARE EDUCATION/TRAINING PROGRAM | Admitting: STUDENT IN AN ORGANIZED HEALTH CARE EDUCATION/TRAINING PROGRAM
Payer: MEDICARE

## 2025-03-18 ENCOUNTER — ANESTHESIA (OUTPATIENT)
Dept: OPERATING ROOM | Facility: HOSPITAL | Age: 75
End: 2025-03-18
Payer: MEDICARE

## 2025-03-18 DIAGNOSIS — C51.9 VULVAR CANCER (MULTI): ICD-10-CM

## 2025-03-18 DIAGNOSIS — G89.18 POSTOPERATIVE PAIN: Primary | ICD-10-CM

## 2025-03-18 PROBLEM — Z90.79: Status: ACTIVE | Noted: 2025-03-18

## 2025-03-18 LAB
GLUCOSE BLD MANUAL STRIP-MCNC: 182 MG/DL (ref 74–99)
GLUCOSE BLD MANUAL STRIP-MCNC: 339 MG/DL (ref 74–99)

## 2025-03-18 PROCEDURE — 2500000004 HC RX 250 GENERAL PHARMACY W/ HCPCS (ALT 636 FOR OP/ED): Performed by: ANESTHESIOLOGY

## 2025-03-18 PROCEDURE — 2500000004 HC RX 250 GENERAL PHARMACY W/ HCPCS (ALT 636 FOR OP/ED)

## 2025-03-18 PROCEDURE — 2500000004 HC RX 250 GENERAL PHARMACY W/ HCPCS (ALT 636 FOR OP/ED): Performed by: STUDENT IN AN ORGANIZED HEALTH CARE EDUCATION/TRAINING PROGRAM

## 2025-03-18 PROCEDURE — 2500000001 HC RX 250 WO HCPCS SELF ADMINISTERED DRUGS (ALT 637 FOR MEDICARE OP)

## 2025-03-18 PROCEDURE — 88309 TISSUE EXAM BY PATHOLOGIST: CPT | Performed by: PATHOLOGY

## 2025-03-18 PROCEDURE — 07BJ4ZX EXCISION OF LEFT INGUINAL LYMPHATIC, PERCUTANEOUS ENDOSCOPIC APPROACH, DIAGNOSTIC: ICD-10-PCS | Performed by: STUDENT IN AN ORGANIZED HEALTH CARE EDUCATION/TRAINING PROGRAM

## 2025-03-18 PROCEDURE — 78195 LYMPH SYSTEM IMAGING: CPT | Performed by: RADIOLOGY

## 2025-03-18 PROCEDURE — 2500000002 HC RX 250 W HCPCS SELF ADMINISTERED DRUGS (ALT 637 FOR MEDICARE OP, ALT 636 FOR OP/ED)

## 2025-03-18 PROCEDURE — A9520 TC99 TILMANOCEPT DIAG 0.5MCI: HCPCS | Performed by: STUDENT IN AN ORGANIZED HEALTH CARE EDUCATION/TRAINING PROGRAM

## 2025-03-18 PROCEDURE — A56632: Performed by: ANESTHESIOLOGY

## 2025-03-18 PROCEDURE — 2500000005 HC RX 250 GENERAL PHARMACY W/O HCPCS: Performed by: STUDENT IN AN ORGANIZED HEALTH CARE EDUCATION/TRAINING PROGRAM

## 2025-03-18 PROCEDURE — 99100 ANES PT EXTEME AGE<1 YR&>70: CPT | Performed by: ANESTHESIOLOGY

## 2025-03-18 PROCEDURE — 3600000003 HC OR TIME - INITIAL BASE CHARGE - PROCEDURE LEVEL THREE: Performed by: STUDENT IN AN ORGANIZED HEALTH CARE EDUCATION/TRAINING PROGRAM

## 2025-03-18 PROCEDURE — RXMED WILLOW AMBULATORY MEDICATION CHARGE

## 2025-03-18 PROCEDURE — 1170000001 HC PRIVATE ONCOLOGY ROOM DAILY

## 2025-03-18 PROCEDURE — 2720000007 HC OR 272 NO HCPCS: Performed by: STUDENT IN AN ORGANIZED HEALTH CARE EDUCATION/TRAINING PROGRAM

## 2025-03-18 PROCEDURE — 3700000002 HC GENERAL ANESTHESIA TIME - EACH INCREMENTAL 1 MINUTE: Performed by: STUDENT IN AN ORGANIZED HEALTH CARE EDUCATION/TRAINING PROGRAM

## 2025-03-18 PROCEDURE — 7100000001 HC RECOVERY ROOM TIME - INITIAL BASE CHARGE: Performed by: STUDENT IN AN ORGANIZED HEALTH CARE EDUCATION/TRAINING PROGRAM

## 2025-03-18 PROCEDURE — 88307 TISSUE EXAM BY PATHOLOGIST: CPT | Performed by: PATHOLOGY

## 2025-03-18 PROCEDURE — 82947 ASSAY GLUCOSE BLOOD QUANT: CPT

## 2025-03-18 PROCEDURE — 3600000008 HC OR TIME - EACH INCREMENTAL 1 MINUTE - PROCEDURE LEVEL THREE: Performed by: STUDENT IN AN ORGANIZED HEALTH CARE EDUCATION/TRAINING PROGRAM

## 2025-03-18 PROCEDURE — 88307 TISSUE EXAM BY PATHOLOGIST: CPT | Mod: TC,SUR | Performed by: STUDENT IN AN ORGANIZED HEALTH CARE EDUCATION/TRAINING PROGRAM

## 2025-03-18 PROCEDURE — 3700000001 HC GENERAL ANESTHESIA TIME - INITIAL BASE CHARGE: Performed by: STUDENT IN AN ORGANIZED HEALTH CARE EDUCATION/TRAINING PROGRAM

## 2025-03-18 PROCEDURE — 0UTMXZZ RESECTION OF VULVA, EXTERNAL APPROACH: ICD-10-PCS | Performed by: STUDENT IN AN ORGANIZED HEALTH CARE EDUCATION/TRAINING PROGRAM

## 2025-03-18 PROCEDURE — 78830 RP LOCLZJ TUM SPECT W/CT 1: CPT | Performed by: RADIOLOGY

## 2025-03-18 PROCEDURE — 96372 THER/PROPH/DIAG INJ SC/IM: CPT

## 2025-03-18 PROCEDURE — 3430000001 HC RX 343 DIAGNOSTIC RADIOPHARMACEUTICALS: Performed by: STUDENT IN AN ORGANIZED HEALTH CARE EDUCATION/TRAINING PROGRAM

## 2025-03-18 PROCEDURE — 2500000005 HC RX 250 GENERAL PHARMACY W/O HCPCS

## 2025-03-18 PROCEDURE — 7100000002 HC RECOVERY ROOM TIME - EACH INCREMENTAL 1 MINUTE: Performed by: STUDENT IN AN ORGANIZED HEALTH CARE EDUCATION/TRAINING PROGRAM

## 2025-03-18 PROCEDURE — 07BH4ZX EXCISION OF RIGHT INGUINAL LYMPHATIC, PERCUTANEOUS ENDOSCOPIC APPROACH, DIAGNOSTIC: ICD-10-PCS | Performed by: STUDENT IN AN ORGANIZED HEALTH CARE EDUCATION/TRAINING PROGRAM

## 2025-03-18 PROCEDURE — 78830 RP LOCLZJ TUM SPECT W/CT 1: CPT

## 2025-03-18 PROCEDURE — 56632 VLVCTMY RAD PRTL BI LYMPHAD: CPT | Performed by: STUDENT IN AN ORGANIZED HEALTH CARE EDUCATION/TRAINING PROGRAM

## 2025-03-18 PROCEDURE — 96372 THER/PROPH/DIAG INJ SC/IM: CPT | Performed by: STUDENT IN AN ORGANIZED HEALTH CARE EDUCATION/TRAINING PROGRAM

## 2025-03-18 RX ORDER — POLYETHYLENE GLYCOL 3350 17 G/17G
17 POWDER, FOR SOLUTION ORAL DAILY
Qty: 238 G | Refills: 0 | Status: SHIPPED | OUTPATIENT
Start: 2025-03-18 | End: 2025-04-02

## 2025-03-18 RX ORDER — IBUPROFEN 600 MG/1
600 TABLET ORAL EVERY 6 HOURS
Status: DISCONTINUED | OUTPATIENT
Start: 2025-03-19 | End: 2025-03-19 | Stop reason: HOSPADM

## 2025-03-18 RX ORDER — ONDANSETRON HYDROCHLORIDE 2 MG/ML
4 INJECTION, SOLUTION INTRAVENOUS ONCE AS NEEDED
Status: DISCONTINUED | OUTPATIENT
Start: 2025-03-18 | End: 2025-03-18 | Stop reason: HOSPADM

## 2025-03-18 RX ORDER — INSULIN LISPRO 100 [IU]/ML
0-5 INJECTION, SOLUTION INTRAVENOUS; SUBCUTANEOUS
Status: DISCONTINUED | OUTPATIENT
Start: 2025-03-18 | End: 2025-03-19 | Stop reason: HOSPADM

## 2025-03-18 RX ORDER — ALBUTEROL SULFATE 0.83 MG/ML
2.5 SOLUTION RESPIRATORY (INHALATION) ONCE AS NEEDED
Status: DISCONTINUED | OUTPATIENT
Start: 2025-03-18 | End: 2025-03-18 | Stop reason: HOSPADM

## 2025-03-18 RX ORDER — INSULIN LISPRO 100 [IU]/ML
0-5 INJECTION, SOLUTION INTRAVENOUS; SUBCUTANEOUS
Status: COMPLETED | OUTPATIENT
Start: 2025-03-19 | End: 2025-03-18

## 2025-03-18 RX ORDER — LABETALOL HYDROCHLORIDE 5 MG/ML
5 INJECTION, SOLUTION INTRAVENOUS ONCE AS NEEDED
Status: DISCONTINUED | OUTPATIENT
Start: 2025-03-18 | End: 2025-03-18 | Stop reason: HOSPADM

## 2025-03-18 RX ORDER — PHENYLEPHRINE HCL IN 0.9% NACL 0.4MG/10ML
SYRINGE (ML) INTRAVENOUS AS NEEDED
Status: DISCONTINUED | OUTPATIENT
Start: 2025-03-18 | End: 2025-03-18

## 2025-03-18 RX ORDER — AMLODIPINE BESYLATE 5 MG/1
5 TABLET ORAL DAILY
Status: DISCONTINUED | OUTPATIENT
Start: 2025-03-18 | End: 2025-03-19 | Stop reason: HOSPADM

## 2025-03-18 RX ORDER — PROPOFOL 10 MG/ML
INJECTION, EMULSION INTRAVENOUS AS NEEDED
Status: DISCONTINUED | OUTPATIENT
Start: 2025-03-18 | End: 2025-03-18

## 2025-03-18 RX ORDER — NALOXONE HYDROCHLORIDE 0.4 MG/ML
0.1 INJECTION, SOLUTION INTRAMUSCULAR; INTRAVENOUS; SUBCUTANEOUS EVERY 5 MIN PRN
Status: DISCONTINUED | OUTPATIENT
Start: 2025-03-18 | End: 2025-03-19 | Stop reason: HOSPADM

## 2025-03-18 RX ORDER — POLYETHYLENE GLYCOL 3350 17 G/17G
17 POWDER, FOR SOLUTION ORAL DAILY
Status: DISCONTINUED | OUTPATIENT
Start: 2025-03-18 | End: 2025-03-19 | Stop reason: HOSPADM

## 2025-03-18 RX ORDER — ACETAMINOPHEN 325 MG/1
975 TABLET ORAL ONCE
Status: DISCONTINUED | OUTPATIENT
Start: 2025-03-18 | End: 2025-03-18 | Stop reason: HOSPADM

## 2025-03-18 RX ORDER — HEPARIN SODIUM 5000 [USP'U]/ML
5000 INJECTION, SOLUTION INTRAVENOUS; SUBCUTANEOUS ONCE
Status: DISCONTINUED | OUTPATIENT
Start: 2025-03-18 | End: 2025-03-18 | Stop reason: HOSPADM

## 2025-03-18 RX ORDER — MIDAZOLAM HYDROCHLORIDE 1 MG/ML
INJECTION INTRAMUSCULAR; INTRAVENOUS AS NEEDED
Status: DISCONTINUED | OUTPATIENT
Start: 2025-03-18 | End: 2025-03-18

## 2025-03-18 RX ORDER — KETOROLAC TROMETHAMINE 15 MG/ML
15 INJECTION, SOLUTION INTRAMUSCULAR; INTRAVENOUS EVERY 6 HOURS
Status: DISCONTINUED | OUTPATIENT
Start: 2025-03-18 | End: 2025-03-19 | Stop reason: HOSPADM

## 2025-03-18 RX ORDER — OXYCODONE HYDROCHLORIDE 5 MG/1
5 TABLET ORAL EVERY 4 HOURS PRN
Qty: 12 TABLET | Refills: 0 | Status: SHIPPED | OUTPATIENT
Start: 2025-03-18

## 2025-03-18 RX ORDER — HYDROMORPHONE HYDROCHLORIDE 1 MG/ML
INJECTION, SOLUTION INTRAMUSCULAR; INTRAVENOUS; SUBCUTANEOUS AS NEEDED
Status: DISCONTINUED | OUTPATIENT
Start: 2025-03-18 | End: 2025-03-18

## 2025-03-18 RX ORDER — CELECOXIB 200 MG/1
400 CAPSULE ORAL ONCE
Status: COMPLETED | OUTPATIENT
Start: 2025-03-18 | End: 2025-03-18

## 2025-03-18 RX ORDER — CELECOXIB 200 MG/1
400 CAPSULE ORAL ONCE
Status: DISCONTINUED | OUTPATIENT
Start: 2025-03-18 | End: 2025-03-18 | Stop reason: HOSPADM

## 2025-03-18 RX ORDER — LIDOCAINE HYDROCHLORIDE 10 MG/ML
INJECTION, SOLUTION INFILTRATION; PERINEURAL AS NEEDED
Status: DISCONTINUED | OUTPATIENT
Start: 2025-03-18 | End: 2025-03-18 | Stop reason: HOSPADM

## 2025-03-18 RX ORDER — ONDANSETRON 4 MG/1
4 TABLET, ORALLY DISINTEGRATING ORAL EVERY 6 HOURS PRN
Status: DISCONTINUED | OUTPATIENT
Start: 2025-03-18 | End: 2025-03-19 | Stop reason: HOSPADM

## 2025-03-18 RX ORDER — OXYCODONE HYDROCHLORIDE 5 MG/1
5 TABLET ORAL EVERY 4 HOURS PRN
Status: DISCONTINUED | OUTPATIENT
Start: 2025-03-18 | End: 2025-03-18 | Stop reason: HOSPADM

## 2025-03-18 RX ORDER — METOPROLOL TARTRATE 100 MG/1
100 TABLET ORAL 2 TIMES DAILY
Status: DISCONTINUED | OUTPATIENT
Start: 2025-03-18 | End: 2025-03-19 | Stop reason: HOSPADM

## 2025-03-18 RX ORDER — HYDROMORPHONE HYDROCHLORIDE 0.2 MG/ML
0.2 INJECTION INTRAMUSCULAR; INTRAVENOUS; SUBCUTANEOUS EVERY 5 MIN PRN
Status: DISCONTINUED | OUTPATIENT
Start: 2025-03-18 | End: 2025-03-18 | Stop reason: HOSPADM

## 2025-03-18 RX ORDER — SODIUM CHLORIDE, SODIUM LACTATE, POTASSIUM CHLORIDE, CALCIUM CHLORIDE 600; 310; 30; 20 MG/100ML; MG/100ML; MG/100ML; MG/100ML
40 INJECTION, SOLUTION INTRAVENOUS CONTINUOUS
Status: DISCONTINUED | OUTPATIENT
Start: 2025-03-18 | End: 2025-03-19

## 2025-03-18 RX ORDER — HEPARIN SODIUM 5000 [USP'U]/ML
5000 INJECTION, SOLUTION INTRAVENOUS; SUBCUTANEOUS ONCE
Status: COMPLETED | OUTPATIENT
Start: 2025-03-18 | End: 2025-03-18

## 2025-03-18 RX ORDER — ACETAMINOPHEN 325 MG/1
975 TABLET ORAL EVERY 6 HOURS
Status: DISCONTINUED | OUTPATIENT
Start: 2025-03-18 | End: 2025-03-19 | Stop reason: HOSPADM

## 2025-03-18 RX ORDER — IBUPROFEN 600 MG/1
600 TABLET ORAL EVERY 6 HOURS SCHEDULED
Qty: 56 TABLET | Refills: 0 | Status: SHIPPED | OUTPATIENT
Start: 2025-03-18 | End: 2025-04-02

## 2025-03-18 RX ORDER — WATER 1 ML/ML
INJECTION IRRIGATION AS NEEDED
Status: DISCONTINUED | OUTPATIENT
Start: 2025-03-18 | End: 2025-03-18 | Stop reason: HOSPADM

## 2025-03-18 RX ORDER — ENOXAPARIN SODIUM 100 MG/ML
40 INJECTION SUBCUTANEOUS EVERY 12 HOURS SCHEDULED
Status: CANCELLED | OUTPATIENT
Start: 2025-03-18

## 2025-03-18 RX ORDER — OXYCODONE HYDROCHLORIDE 5 MG/1
5 TABLET ORAL EVERY 4 HOURS PRN
Status: DISCONTINUED | OUTPATIENT
Start: 2025-03-18 | End: 2025-03-19 | Stop reason: HOSPADM

## 2025-03-18 RX ORDER — GABAPENTIN 600 MG/1
600 TABLET ORAL ONCE
Status: COMPLETED | OUTPATIENT
Start: 2025-03-18 | End: 2025-03-18

## 2025-03-18 RX ORDER — GABAPENTIN 600 MG/1
600 TABLET ORAL ONCE
Status: DISCONTINUED | OUTPATIENT
Start: 2025-03-18 | End: 2025-03-18 | Stop reason: HOSPADM

## 2025-03-18 RX ORDER — ONDANSETRON HYDROCHLORIDE 2 MG/ML
4 INJECTION, SOLUTION INTRAVENOUS EVERY 6 HOURS PRN
Status: DISCONTINUED | OUTPATIENT
Start: 2025-03-18 | End: 2025-03-19 | Stop reason: HOSPADM

## 2025-03-18 RX ORDER — FENTANYL CITRATE 50 UG/ML
INJECTION, SOLUTION INTRAMUSCULAR; INTRAVENOUS AS NEEDED
Status: DISCONTINUED | OUTPATIENT
Start: 2025-03-18 | End: 2025-03-18

## 2025-03-18 RX ORDER — ACETAMINOPHEN 325 MG/1
975 TABLET ORAL ONCE
Status: COMPLETED | OUTPATIENT
Start: 2025-03-18 | End: 2025-03-18

## 2025-03-18 RX ORDER — SODIUM CHLORIDE 0.9 G/100ML
INJECTION, SOLUTION IRRIGATION AS NEEDED
Status: DISCONTINUED | OUTPATIENT
Start: 2025-03-18 | End: 2025-03-18 | Stop reason: HOSPADM

## 2025-03-18 RX ORDER — PHENYLEPHRINE 10 MG/250 ML(40 MCG/ML)IN 0.9 % SOD.CHLORIDE INTRAVENOUS
CONTINUOUS PRN
Status: DISCONTINUED | OUTPATIENT
Start: 2025-03-18 | End: 2025-03-18

## 2025-03-18 RX ORDER — CEFAZOLIN 1 G/1
INJECTION, POWDER, FOR SOLUTION INTRAVENOUS AS NEEDED
Status: DISCONTINUED | OUTPATIENT
Start: 2025-03-18 | End: 2025-03-18

## 2025-03-18 RX ORDER — ONDANSETRON HYDROCHLORIDE 2 MG/ML
INJECTION, SOLUTION INTRAVENOUS AS NEEDED
Status: DISCONTINUED | OUTPATIENT
Start: 2025-03-18 | End: 2025-03-18

## 2025-03-18 RX ORDER — ROCURONIUM BROMIDE 10 MG/ML
INJECTION, SOLUTION INTRAVENOUS AS NEEDED
Status: DISCONTINUED | OUTPATIENT
Start: 2025-03-18 | End: 2025-03-18

## 2025-03-18 RX ORDER — APREPITANT 40 MG/1
CAPSULE ORAL AS NEEDED
Status: DISCONTINUED | OUTPATIENT
Start: 2025-03-18 | End: 2025-03-18

## 2025-03-18 RX ORDER — SODIUM CHLORIDE, SODIUM LACTATE, POTASSIUM CHLORIDE, CALCIUM CHLORIDE 600; 310; 30; 20 MG/100ML; MG/100ML; MG/100ML; MG/100ML
100 INJECTION, SOLUTION INTRAVENOUS CONTINUOUS
Status: DISCONTINUED | OUTPATIENT
Start: 2025-03-18 | End: 2025-03-19

## 2025-03-18 RX ORDER — ACETAMINOPHEN 500 MG
1000 TABLET ORAL EVERY 6 HOURS PRN
Qty: 112 TABLET | Refills: 0 | Status: SHIPPED | OUTPATIENT
Start: 2025-03-18 | End: 2025-04-02

## 2025-03-18 RX ORDER — LIDOCAINE HYDROCHLORIDE 20 MG/ML
INJECTION, SOLUTION INFILTRATION; PERINEURAL AS NEEDED
Status: DISCONTINUED | OUTPATIENT
Start: 2025-03-18 | End: 2025-03-18

## 2025-03-18 RX ORDER — LIDOCAINE HYDROCHLORIDE 10 MG/ML
0.1 INJECTION, SOLUTION EPIDURAL; INFILTRATION; INTRACAUDAL; PERINEURAL ONCE
Status: DISCONTINUED | OUTPATIENT
Start: 2025-03-18 | End: 2025-03-18 | Stop reason: HOSPADM

## 2025-03-18 RX ORDER — INDOCYANINE GREEN AND WATER 25 MG
KIT INJECTION AS NEEDED
Status: DISCONTINUED | OUTPATIENT
Start: 2025-03-18 | End: 2025-03-18 | Stop reason: HOSPADM

## 2025-03-18 RX ORDER — HYDRALAZINE HYDROCHLORIDE 20 MG/ML
5 INJECTION INTRAMUSCULAR; INTRAVENOUS EVERY 30 MIN PRN
Status: DISCONTINUED | OUTPATIENT
Start: 2025-03-18 | End: 2025-03-18 | Stop reason: HOSPADM

## 2025-03-18 RX ORDER — SIMETHICONE 80 MG
80 TABLET,CHEWABLE ORAL 4 TIMES DAILY PRN
Status: DISCONTINUED | OUTPATIENT
Start: 2025-03-18 | End: 2025-03-19 | Stop reason: HOSPADM

## 2025-03-18 RX ORDER — HEPARIN SODIUM 5000 [USP'U]/ML
7500 INJECTION, SOLUTION INTRAVENOUS; SUBCUTANEOUS EVERY 8 HOURS SCHEDULED
Status: DISCONTINUED | OUTPATIENT
Start: 2025-03-18 | End: 2025-03-19 | Stop reason: HOSPADM

## 2025-03-18 RX ORDER — NORETHINDRONE AND ETHINYL ESTRADIOL 0.5-0.035
KIT ORAL AS NEEDED
Status: DISCONTINUED | OUTPATIENT
Start: 2025-03-18 | End: 2025-03-18

## 2025-03-18 RX ORDER — METHOCARBAMOL 100 MG/ML
1000 INJECTION, SOLUTION INTRAMUSCULAR; INTRAVENOUS ONCE AS NEEDED
Status: DISCONTINUED | OUTPATIENT
Start: 2025-03-18 | End: 2025-03-18 | Stop reason: HOSPADM

## 2025-03-18 RX ADMIN — Medication 80 MCG: at 14:06

## 2025-03-18 RX ADMIN — GABAPENTIN 600 MG: 600 TABLET, FILM COATED ORAL at 10:39

## 2025-03-18 RX ADMIN — Medication 120 MCG: at 13:37

## 2025-03-18 RX ADMIN — ACETAMINOPHEN 975 MG: 325 TABLET ORAL at 18:54

## 2025-03-18 RX ADMIN — Medication 80 MCG: at 14:02

## 2025-03-18 RX ADMIN — POLYETHYLENE GLYCOL 3350 17 G: 17 POWDER, FOR SOLUTION ORAL at 18:53

## 2025-03-18 RX ADMIN — AMLODIPINE BESYLATE 5 MG: 5 TABLET ORAL at 19:05

## 2025-03-18 RX ADMIN — CELECOXIB 400 MG: 200 CAPSULE ORAL at 10:40

## 2025-03-18 RX ADMIN — SUGAMMADEX 200 MG: 100 INJECTION, SOLUTION INTRAVENOUS at 16:35

## 2025-03-18 RX ADMIN — DEXAMETHASONE SODIUM PHOSPHATE 4 MG: 4 INJECTION INTRA-ARTICULAR; INTRALESIONAL; INTRAMUSCULAR; INTRAVENOUS; SOFT TISSUE at 13:06

## 2025-03-18 RX ADMIN — FENTANYL CITRATE 100 MCG: 50 INJECTION, SOLUTION INTRAMUSCULAR; INTRAVENOUS at 12:52

## 2025-03-18 RX ADMIN — ROCURONIUM 10 MG: 50 INJECTION, SOLUTION INTRAVENOUS at 14:58

## 2025-03-18 RX ADMIN — PROPOFOL 10 MG: 10 INJECTION, EMULSION INTRAVENOUS at 14:31

## 2025-03-18 RX ADMIN — HYDROMORPHONE HYDROCHLORIDE 0.4 MG: 1 INJECTION, SOLUTION INTRAMUSCULAR; INTRAVENOUS; SUBCUTANEOUS at 16:23

## 2025-03-18 RX ADMIN — ACETAMINOPHEN 975 MG: 325 TABLET ORAL at 23:53

## 2025-03-18 RX ADMIN — ONDANSETRON 4 MG: 2 INJECTION, SOLUTION INTRAMUSCULAR; INTRAVENOUS at 16:22

## 2025-03-18 RX ADMIN — PHENYLEPHRINE-NACL IV SOLUTION 10 MG/250ML-0.9% 0.3 MCG/KG/MIN: 10-0.9/25 SOLUTION at 14:06

## 2025-03-18 RX ADMIN — ROCURONIUM 10 MG: 50 INJECTION, SOLUTION INTRAVENOUS at 14:28

## 2025-03-18 RX ADMIN — MIDAZOLAM HYDROCHLORIDE 1 MG: 2 INJECTION, SOLUTION INTRAMUSCULAR; INTRAVENOUS at 12:44

## 2025-03-18 RX ADMIN — CEFAZOLIN 2 G: 1 INJECTION, POWDER, FOR SOLUTION INTRAMUSCULAR; INTRAVENOUS at 13:06

## 2025-03-18 RX ADMIN — LIDOCAINE HYDROCHLORIDE 100 MG: 20 INJECTION, SOLUTION INFILTRATION; PERINEURAL at 12:52

## 2025-03-18 RX ADMIN — ROCURONIUM 10 MG: 50 INJECTION, SOLUTION INTRAVENOUS at 15:11

## 2025-03-18 RX ADMIN — METOPROLOL TARTRATE 100 MG: 100 TABLET, FILM COATED ORAL at 21:59

## 2025-03-18 RX ADMIN — PROPOFOL 10 MG: 10 INJECTION, EMULSION INTRAVENOUS at 14:36

## 2025-03-18 RX ADMIN — PROPOFOL 100 MCG/KG/MIN: 10 INJECTION, EMULSION INTRAVENOUS at 12:54

## 2025-03-18 RX ADMIN — SODIUM CHLORIDE, SODIUM LACTATE, POTASSIUM CHLORIDE, AND CALCIUM CHLORIDE 100 ML/HR: .6; .31; .03; .02 INJECTION, SOLUTION INTRAVENOUS at 16:40

## 2025-03-18 RX ADMIN — APREPITANT 40 MG: 40 CAPSULE ORAL at 12:31

## 2025-03-18 RX ADMIN — HYDROMORPHONE HYDROCHLORIDE 0.4 MG: 1 INJECTION, SOLUTION INTRAMUSCULAR; INTRAVENOUS; SUBCUTANEOUS at 14:33

## 2025-03-18 RX ADMIN — KETOROLAC TROMETHAMINE 15 MG: 15 INJECTION, SOLUTION INTRAMUSCULAR; INTRAVENOUS at 18:53

## 2025-03-18 RX ADMIN — HYDROMORPHONE HYDROCHLORIDE 0.2 MG: 1 INJECTION, SOLUTION INTRAMUSCULAR; INTRAVENOUS; SUBCUTANEOUS at 15:21

## 2025-03-18 RX ADMIN — HEPARIN SODIUM 5000 UNITS: 5000 INJECTION, SOLUTION INTRAVENOUS; SUBCUTANEOUS at 10:40

## 2025-03-18 RX ADMIN — SODIUM CHLORIDE, POTASSIUM CHLORIDE, SODIUM LACTATE AND CALCIUM CHLORIDE 40 ML/HR: 600; 310; 30; 20 INJECTION, SOLUTION INTRAVENOUS at 19:05

## 2025-03-18 RX ADMIN — EPHEDRINE SULFATE 10 MG: 50 INJECTION, SOLUTION INTRAVENOUS at 13:32

## 2025-03-18 RX ADMIN — Medication 120 MCG: at 13:10

## 2025-03-18 RX ADMIN — PROPOFOL 30 MG: 10 INJECTION, EMULSION INTRAVENOUS at 15:22

## 2025-03-18 RX ADMIN — EPHEDRINE SULFATE 5 MG: 50 INJECTION, SOLUTION INTRAVENOUS at 13:29

## 2025-03-18 RX ADMIN — Medication 0.55 MILLICURIE: at 07:35

## 2025-03-18 RX ADMIN — INSULIN LISPRO 4 UNITS: 100 INJECTION, SOLUTION INTRAVENOUS; SUBCUTANEOUS at 23:53

## 2025-03-18 RX ADMIN — PROPOFOL 20 MG: 10 INJECTION, EMULSION INTRAVENOUS at 13:14

## 2025-03-18 RX ADMIN — ACETAMINOPHEN 975 MG: 325 TABLET ORAL at 10:39

## 2025-03-18 RX ADMIN — ROCURONIUM 60 MG: 50 INJECTION, SOLUTION INTRAVENOUS at 12:52

## 2025-03-18 RX ADMIN — HEPARIN SODIUM 7500 UNITS: 5000 INJECTION, SOLUTION INTRAVENOUS; SUBCUTANEOUS at 22:00

## 2025-03-18 RX ADMIN — PROPOFOL 170 MG: 10 INJECTION, EMULSION INTRAVENOUS at 12:52

## 2025-03-18 RX ADMIN — ROCURONIUM 10 MG: 50 INJECTION, SOLUTION INTRAVENOUS at 13:47

## 2025-03-18 RX ADMIN — Medication 80 MCG: at 13:23

## 2025-03-18 SDOH — ECONOMIC STABILITY: HOUSING INSECURITY: DO YOU FEEL UNSAFE GOING BACK TO THE PLACE WHERE YOU LIVE?: NO

## 2025-03-18 SDOH — HEALTH STABILITY: MENTAL HEALTH: CURRENT SMOKER: 0

## 2025-03-18 SDOH — SOCIAL STABILITY: SOCIAL INSECURITY: ARE YOU OR HAVE YOU BEEN THREATENED OR ABUSED PHYSICALLY, EMOTIONALLY, OR SEXUALLY BY ANYONE?: NO

## 2025-03-18 SDOH — SOCIAL STABILITY: SOCIAL INSECURITY: WERE YOU ABLE TO COMPLETE ALL THE BEHAVIORAL HEALTH SCREENINGS?: YES

## 2025-03-18 SDOH — SOCIAL STABILITY: SOCIAL INSECURITY: DO YOU FEEL ANYONE HAS EXPLOITED OR TAKEN ADVANTAGE OF YOU FINANCIALLY OR OF YOUR PERSONAL PROPERTY?: NO

## 2025-03-18 SDOH — SOCIAL STABILITY: SOCIAL INSECURITY: DO YOU FEEL UNSAFE GOING BACK TO THE PLACE WHERE YOU ARE LIVING?: NO

## 2025-03-18 SDOH — SOCIAL STABILITY: SOCIAL INSECURITY: ABUSE: ADULT

## 2025-03-18 SDOH — SOCIAL STABILITY: SOCIAL INSECURITY: HAVE YOU HAD THOUGHTS OF HARMING ANYONE ELSE?: NO

## 2025-03-18 SDOH — SOCIAL STABILITY: SOCIAL INSECURITY: HAVE YOU HAD ANY THOUGHTS OF HARMING ANYONE ELSE?: NO

## 2025-03-18 SDOH — SOCIAL STABILITY: SOCIAL INSECURITY: HAS ANYONE EVER THREATENED TO HURT YOUR FAMILY OR YOUR PETS?: NO

## 2025-03-18 SDOH — SOCIAL STABILITY: SOCIAL INSECURITY: ARE THERE ANY APPARENT SIGNS OF INJURIES/BEHAVIORS THAT COULD BE RELATED TO ABUSE/NEGLECT?: NO

## 2025-03-18 SDOH — SOCIAL STABILITY: SOCIAL INSECURITY: DOES ANYONE TRY TO KEEP YOU FROM HAVING/CONTACTING OTHER FRIENDS OR DOING THINGS OUTSIDE YOUR HOME?: NO

## 2025-03-18 ASSESSMENT — COGNITIVE AND FUNCTIONAL STATUS - GENERAL
CLIMB 3 TO 5 STEPS WITH RAILING: A LITTLE
DAILY ACTIVITIY SCORE: 23
WALKING IN HOSPITAL ROOM: A LITTLE
MOBILITY SCORE: 22
DRESSING REGULAR LOWER BODY CLOTHING: A LITTLE

## 2025-03-18 ASSESSMENT — ACTIVITIES OF DAILY LIVING (ADL)
LACK_OF_TRANSPORTATION: PATIENT DECLINED
HEARING - RIGHT EAR: FUNCTIONAL
GROOMING: INDEPENDENT
TOILETING: INDEPENDENT
FEEDING YOURSELF: INDEPENDENT
JUDGMENT_ADEQUATE_SAFELY_COMPLETE_DAILY_ACTIVITIES: YES
WALKS IN HOME: INDEPENDENT
ADEQUATE_TO_COMPLETE_ADL: YES
PATIENT'S MEMORY ADEQUATE TO SAFELY COMPLETE DAILY ACTIVITIES?: YES
BATHING: INDEPENDENT
DRESSING YOURSELF: INDEPENDENT
HEARING - LEFT EAR: FUNCTIONAL

## 2025-03-18 ASSESSMENT — LIFESTYLE VARIABLES
AUDIT-C TOTAL SCORE: 0
PRESCIPTION_ABUSE_PAST_12_MONTHS: NO
HOW OFTEN DO YOU HAVE A DRINK CONTAINING ALCOHOL: NEVER
SKIP TO QUESTIONS 9-10: 1
SUBSTANCE_ABUSE_PAST_12_MONTHS: NO
AUDIT-C TOTAL SCORE: 0
HOW OFTEN DO YOU HAVE 6 OR MORE DRINKS ON ONE OCCASION: NEVER
HOW MANY STANDARD DRINKS CONTAINING ALCOHOL DO YOU HAVE ON A TYPICAL DAY: PATIENT DOES NOT DRINK

## 2025-03-18 ASSESSMENT — PATIENT HEALTH QUESTIONNAIRE - PHQ9
1. LITTLE INTEREST OR PLEASURE IN DOING THINGS: NOT AT ALL
SUM OF ALL RESPONSES TO PHQ9 QUESTIONS 1 & 2: 0
2. FEELING DOWN, DEPRESSED OR HOPELESS: NOT AT ALL

## 2025-03-18 ASSESSMENT — PAIN - FUNCTIONAL ASSESSMENT
PAIN_FUNCTIONAL_ASSESSMENT: 0-10

## 2025-03-18 ASSESSMENT — PAIN SCALES - GENERAL
PAIN_LEVEL: 2
PAINLEVEL_OUTOF10: 2
PAINLEVEL_OUTOF10: 0 - NO PAIN
PAINLEVEL_OUTOF10: 2
PAINLEVEL_OUTOF10: 3
PAINLEVEL_OUTOF10: 3
PAINLEVEL_OUTOF10: 0 - NO PAIN
PAINLEVEL_OUTOF10: 0 - NO PAIN
PAINLEVEL_OUTOF10: 3

## 2025-03-18 NOTE — ANESTHESIA PROCEDURE NOTES
Peripheral IV  Date/Time: 3/18/2025 1:05 PM  Inserted by: Rhett Pandya MD    Placement  Needle size: 18 G  Laterality: right  Location: forearm  Local anesthetic: none  Site prep: chlorhexidine  Technique: anatomical landmarks  Attempts: 1

## 2025-03-18 NOTE — H&P
History Of Present Illness  Jodie Son is a 75 y.o. female presenting with radical vulvectomy bilateral sentinel inguinofemoral lymph  node biopsy for vulvar cancer.     Past Medical History  She has a past medical history of Adverse effect of anesthesia, Anxiety, Arthritis, Cataract, CKD (chronic kidney disease), Colovaginal fistula, Delayed emergence from general anesthesia, Depression, GERD (gastroesophageal reflux disease), History of blood transfusion, HLD (hyperlipidemia), HTN (hypertension), Lichen sclerosus, Lumbar disc disease, Mammogram declined, Peripheral neuropathy, PONV (postoperative nausea and vomiting), Sleep apnea, Type 2 diabetes mellitus, Uterine cancer (Multi), and Vaginal lesion.    Surgical History  She has a past surgical history that includes Cholecystectomy (1974); Tonsillectomy (1991); Hysterectomy (1991); Cataract extraction (Bilateral, 2018); Appendectomy (1963); Tubal ligation (1977); Total knee arthroplasty (Right, 10/2021); Colonoscopy (09/2024); and Colon surgery (05/2024).    Oncology History Overview Note   10/30/24 R vulva  granulation tissue, L vulva LS  11/22/24 L vulvectomy invasive SCC, sarcomatoid pattern, dVIN, LS positive margins at 12 and 6 o'clock and within 1 mm of of the deep margin with at least 5 mm DOI consistent with stage IB, R  vulvectomy foci suspicious for superficially invasive SCC approximately 1 mm, HP independent, dVIN and LS  Tumor board reexcision L vulva, bilateral inguinofemoral SLND       Vulvar cancer (Multi)   1/31/2025 Initial Diagnosis    Vulvar cancer (Multi)         Social History  She reports that she quit smoking about 47 years ago. Her smoking use included cigarettes. She has been exposed to tobacco smoke. She has never used smokeless tobacco. She reports that she does not currently use alcohol. She reports that she does not use drugs.     Allergies  Codeine, Norgestrel-ethinyl estradiol, Atorvastatin, Enalapril, Hydroxyzine, Simvastatin,  "and Terazosin    Review of Systems   All other systems reviewed and are negative.       Physical Exam  Vitals and nursing note reviewed. Exam conducted with a chaperone present.   Constitutional:       General: She is not in acute distress.     Appearance: Normal appearance.   HENT:      Head: Normocephalic and atraumatic.      Mouth/Throat:      Mouth: Mucous membranes are moist.      Pharynx: No oropharyngeal exudate or posterior oropharyngeal erythema.   Eyes:      Extraocular Movements: Extraocular movements intact.      Conjunctiva/sclera: Conjunctivae normal.      Pupils: Pupils are equal, round, and reactive to light.   Neck:      Thyroid: No thyroid mass or thyromegaly.   Cardiovascular:      Rate and Rhythm: Normal rate and regular rhythm.      Pulses: Normal pulses.      Heart sounds: Normal heart sounds.   Pulmonary:      Effort: Pulmonary effort is normal.      Breath sounds: Normal breath sounds. No wheezing, rhonchi or rales.   Abdominal:      General: Bowel sounds are normal. There is no distension.      Palpations: Abdomen is soft. There is no mass.      Tenderness: There is no abdominal tenderness.      Hernia: No hernia is present.   Musculoskeletal:         General: No tenderness. Normal range of motion.      Cervical back: Normal range of motion and neck supple. No tenderness.      Right lower leg: No edema.      Left lower leg: No edema.   Skin:     General: Skin is warm.      Findings: No lesion or rash.   Neurological:      General: No focal deficit present.      Mental Status: She is alert and oriented to person, place, and time.   Psychiatric:         Mood and Affect: Mood normal.         Behavior: Behavior normal.          Last Recorded Vitals  Blood pressure 165/73, pulse 65, temperature 36.1 °C (97 °F), temperature source Temporal, resp. rate 18, height 1.6 m (5' 3\"), weight 104 kg (230 lb), SpO2 98%.    Relevant Results  Lab Results   Component Value Date    WBC 5.3 03/11/2025    HGB " 13.3 03/11/2025    HCT 38.8 03/11/2025    MCV 89 03/11/2025     03/11/2025          Assessment/Plan   Assessment & Plan  Vulvar cancer (Multi)      - consent signed  - labs, imaging reviewed successful lymph node mapping on the L will evaluate on the R intra-op and plan for ICG injection to guide further management on the R       Adina Navarro MD MPH

## 2025-03-18 NOTE — ANESTHESIA PROCEDURE NOTES
Airway  Date/Time: 3/18/2025 12:58 PM  Urgency: elective    Airway not difficult    Staffing  Performed: resident   Authorized by: Rhett Pandya MD    Performed by: Ulysses Phillip MD  Patient location during procedure: OR    Indications and Patient Condition  Indications for airway management: anesthesia  Spontaneous Ventilation: absent  Sedation level: deep  Preoxygenated: yes  Patient position: sniffing  Mask difficulty assessment: 2 - vent by mask + OA or adjuvant +/- NMBA  Planned trial extubation    Final Airway Details  Final airway type: endotracheal airway      Successful airway: ETT  Cuffed: yes   Successful intubation technique: direct laryngoscopy  Facilitating devices/methods: intubating stylet  Endotracheal tube insertion site: oral  Blade: Maria Elena  Blade size: #3  ETT size (mm): 7.0  Cormack-Lehane Classification: grade I - full view of glottis  Placement verified by: chest auscultation and capnometry   Inital cuff pressure (cm H2O): 20  Cuff volume (mL): 6  Measured from: teeth  ETT to teeth (cm): 21  Number of attempts at approach: 1

## 2025-03-18 NOTE — ANESTHESIA PREPROCEDURE EVALUATION
"Patient: Vanessa Son \"Jodie\"    Procedure Information       Date/Time: 11/22/24 0305    Procedure: Vulvectomy Simple (Bilateral)    Location: [facility] OR 02 / [facility] OR    Surgeons: Adina Navarro MD MPH            Relevant Problems   Anesthesia (within normal limits)      Cardiac   (+) Essential hypertension   (+) Mixed hyperlipidemia      GI   (+) Gastroesophageal reflux disease      Endocrine   (+) 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 (Multi)   (+) Class 3 severe obesity due to excess calories with serious comorbidity and body mass index (BMI) of 40.0 to 44.9 in adult   (+) Type 2 diabetes mellitus with hyperglycemia, with long-term current use of insulin      Musculoskeletal   (+) Primary osteoarthritis of right knee       Clinical information reviewed:   Tobacco  Allergies  Meds   Med Hx  Surg Hx   Fam Hx  Soc Hx        NPO Detail:  NPO/Void Status  Carbohydrate Drink Given Prior to Surgery? : N  Date of Last Liquid: 03/17/25  Time of Last Liquid: 2200  Date of Last Solid: 03/17/25  Time of Last Solid: 2200  Last Intake Type: Clear fluids         Physical Exam    Airway  Mallampati: II  TM distance: >3 FB  Neck ROM: full     Cardiovascular   Rhythm: regular  Rate: normal     Dental - normal exam     Pulmonary   Breath sounds clear to auscultation     Abdominal          Anesthesia Plan    History of general anesthesia?: yes  History of complications of general anesthesia?: no    ASA 3     general     The patient is not a current smoker.    intravenous induction   Postoperative administration of opioids is intended.  Trial extubation is planned.  Anesthetic plan and risks discussed with patient.  Use of blood products discussed with patient who consented to blood products.    Plan discussed with resident.    "

## 2025-03-18 NOTE — DISCHARGE INSTRUCTIONS
Vulvectomy Discharge Instructions  If you have any questions about your care, please contact us at [phone].    Vulvar Care  1. AVOID TENSION ON THE SUTURES (stretching and pulling) as much as possible. Get in and out of bed with care.  Avoid sitting as much as is feasible.  2. Perform sitz baths or rinse the area with a hand held shower device two to three times per day using lukewarm water.  Avoid soap and do not rub.  3. IMPORTANT - KEEP THE INCISIONS AS DRY AS POSSIBLE in between sitz baths.  After each sitz bath, blot the area with a towel (do not rub).    4. Your vulvar/vaginal area will tend to be mosit with a light drainage. Excess moisture may increase risk for infection. To reduce excess moisture buildup, you may keep a dry wash cloth between your legs.  Replace as necessary to keep dry. Take measures to keep the inguinal (groin) area dry as well.  5. Avoid constipation and straining with bowel movements. You may take 100 mg of Colace/Docusate as a stool softer (available over-the-counter).  Stronger laxatives can be used as necessary. Perform your sitz baths or shower rinses preferably after bowel movements.   6. To reduce risk of lymphocyst (fluid buildup beneath the incision) and infection of the incision, it is important to keep your drains on suction at all times. Empty the bulb as necessary to keep the drain on suction. Please record the total drain output each day and bring the daily tallies to your office appointments. Decisions regarding drain removal will be based on these tallies.  7. If you experience fevers greater than 101 F degrees, foul-smelling drainage, excessive pain not relieved by prescribed medications, new leg swelling or leg pain or have any questions after your discharge, call the Gynecologic Oncology office at [phone].    Medications and Pain Management  Immediately after surgery, nerve pain is the most intense, typically for the first 6 to 12 hours. As the body  heals, it creates inflammation around the incisions sites adding pressure and creating soreness. After 5 days, the inflammation begins to recede and significant improvement in soreness is expected. Pulling on the incisions, especially if sudden, such as when you cough, will reactivate the nerve pain. There are two types of pain pills typically used for post-operative pain management, narcotics such as oxycodone and an anti-inflammatory such as Ibuprofen or Naprosyn.  Taking regular anti-inflammatory pills, such as 600mg of Ibuprofen every 6 hours for the first 5 days and then as needed is recommended. You can alternate ibuprofen with tylenol (975mg or 1000mg). The tylenol can be taken every 6 hours.  If you have problems using NSAIDs, be sure to discuss this with your doctor. The narcotic can be used on a schedule for the first 1 to 2 days but after that, only as you need it. Narcotics can cause constipation, nausea, sleepiness and headaches. You may begin your usual home medications as you were taking before unless directed by your doctor.    GI Function, Nausea and Constipation  Nausea can occasionally be an issue in the first few days after surgery. It is usually caused as a side effect from the anesthesia and pain medicine, particularly narcotics. Taking the pain pills with food is a food way to proactively minimize this. Throwing up, especially after the first day, is not expected and if this happens, you should call your doctor. Feeling gassy and constipation can be a problem for the first week after surgery. Limiting the use of narcotics may be helpful. Stool softeners twice a day and a high fiber diet are safe. If needed, Miralax once daily is a good choice. If no bowel movement after 3 days, you will need to increase the Miralax until soft, regular bowel movements are passing.    Urinating  Because the bladder is disturbed by the surgery, the normal sensation may be temporarily altered. You may not be aware  that your bladder is full. If the bladder is allowed to get over distended, it may make the problem worse. This is why we make sure that you are able to empty your bladder adequately before you go home. For the first few days at home, you should make a point to empty your bladder every 3 to 4 hours. Pain with voiding, especially after the first day, is not expected and may represent a bladder infection.    When to Call the Doctor  Call for any fever above 100.4 F (If you do not feel feverish you do not have to routinely check your temperature.)  Call for severe pain not improved by medications  Call for persistent nausea, vomiting  Call for vaginal bleeding that is heavy as a period or passing blood clots larger than a quarter  Call for unusual swelling in your legs  Call if the incisions develop painful redness and discharge    In an emergency, call 911 or go to an Emergency Department at a nearby hospital

## 2025-03-18 NOTE — SIGNIFICANT EVENT
"Vanessa Son \"Valerie" is a 75 y.o. female on day 1 of admission presenting with Vulvar cancer (Multi).    Subjective   Reports minimal pain . Denies heavy bleeding from vulva. Tolerated PO intake w/o subsequent N/V. Denies CP and SOB.     Objective   Last Recorded Vitals  Blood pressure 129/69, pulse 82, temperature 36.1 °C (97 °F), temperature source Temporal, resp. rate 16, height 1.6 m (5' 3\"), weight 104 kg (230 lb), SpO2 94%.  Intake/Output last 3 Shifts:    Intake/Output Summary (Last 24 hours) at 3/19/2025 0254  Last data filed at 3/18/2025 2143  Gross per 24 hour   Intake 408.33 ml   Output 290 ml   Net 118.33 ml       Physical Exam  Vitals reviewed.   Cardiovascular:      Rate and Rhythm: Normal rate and regular rhythm.   Pulmonary:      Comments: Unlabored on RA, lungs CTAB in anterior lung fields   Abdominal:      Palpations: Abdomen is soft.   Genitourinary:     Comments: Vulva hemostatic with minimal spotting on pad, castorena in place draining straw colored urine   Skin:     General: Skin is warm.   Neurological:      General: No focal deficit present.   Psychiatric:         Thought Content: Thought content normal.         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 Assessment of pad reassuring  - Stable on room air, encourage incentive spirometry 10x/hr while awake  - Regular diet as tolerated, IVF LR at 40cc/hr until tolerating good PO intake   - Bowel regimen: daily Miralax   - Castorena in place. Planning for removal in AM   - DVT ppx: SCDs, ambulation and heparin ppx. Planning for 30 days of ppx Eliquis upon discharge     Comorbidities  - HTN: cont home amlodipine, metoprolol, hold home indapamide, losartan-HCTZ  - T2DM: hold home Victoza, Lantus/Humalog. " POCT + SSI     Dispo: anticipate dc home tomorrow    To be d/w attending in AM  Shruti Lee MD, PGY-3  Gynecologic Oncology,pager [phone]

## 2025-03-18 NOTE — OP NOTE
"Left radical vulvectomy bilateral inguinofemoral sentinel lymph node biopsy any indicated procedures (B) Operative Note     Date: 3/18/2025  OR Location: [facility] OR    Name: Vanessa Son \"Jodie\", : 1950, Age: 75 y.o., MRN: [id number], Sex: female    Diagnosis  Pre-op Diagnosis      * Vulvar cancer (Multi) [C51.9] Post-op Diagnosis     * Vulvar cancer (Multi) [C51.9]     Procedures  Left radical vulvectomy right simple vulvectomy bilateral inguinofemoral sentinel lymph node biopsy   85558 - [place] VULVECTOMY RAD PRTL BI INGUINOFEM LMPHADECTOMY      Surgeons      * Adina Navarro - Primary    Resident/Fellow/Other Assistant:  Surgeons and Role:     * Camille Montano MD - Assisting     * Qasim Maier MD - Resident - Assisting     * Linh Argueta MD - Resident - Assisting    Staff:   [facility]  Scrub Person: Jacquelyn  Circulator: Steffany  Mckenna Circulator: Dianne  Mckenna Scrub: Margie    Anesthesia Staff: Anesthesiologist: Domenico Goins MD; Rhett Pandya MD  C-AA: REYNA Stevens  Anesthesia Resident: Ulysses Phillip MD    Procedure Summary  Anesthesia: General  ASA: III  Estimated Blood Loss: 50 mL  Intra-op Medications:   Administrations occurring from 1205 to 1635 on 25:   Medication Name Total Dose   sterile water irrigation solution 1,000 mL   indocyanine green (IC-Green) injection 25 mg   sodium chloride 0.9 % irrigation solution 1,000 mL   aprepitant (Emend) capsule 40 mg   ceFAZolin (Ancef) vial 1 g 2 g   dexAMETHasone (Decadron) 4 mg/mL 4 mg   ePHEDrine injection 15 mg   fentaNYL (Sublimaze) injection 50 mcg/mL 100 mcg   HYDROmorphone (Dilaudid) injection 1 mg/mL 0.6 mg   lidocaine (Xylocaine) injection 2 % 100 mg   midazolam PF (Versed) injection 1 mg/mL 1 mg   phenylephrine (Aravind-Synephrine) 10 mg in sodium chloride 0.9% 250 mL (0.04 mg/mL) infusion (premix) 0.49 mg   phenylephrine 40 mcg/mL syringe 10 mL 480 mcg   propofol (Diprivan) injection 10 mg/mL 2,441.77 mg " "  rocuronium (ZeMuron) 50 mg/5 mL injection 100 mg              Anesthesia Record               Intraprocedure I/O Totals          Intake    Phenylephrine Drip 0.00 mL    The total shown is the total volume documented since Anesthesia Start was filed.    Total Intake 0 mL          Specimen:   ID Type Source Tests Collected by Time   1 : RIGHT INGUINAL FEMORAL SENTINEL LYNPH NODE Tissue INGUINAL LYMPH NODE DISSECTION RIGHT SURGICAL PATHOLOGY EXAM Adina Navarro MD [facility] 3/18/2025 1442   2 : LEFT INGUINAL FEMORAL SENTINEL LYMPH NODE Tissue INGUINAL LYMPH NODE DISSECTION LEFT SURGICAL PATHOLOGY EXAM Adina Navarro MD [facility] 3/18/2025 1437   3 : LEFT INGUINAL FEMORAL SENTINEL LYMPH NODE Tissue INGUINAL LYMPH NODE DISSECTION LEFT SURGICAL PATHOLOGY EXAM Adina Navarro MD [facility] 3/18/2025 1444   4 : RIGHT SIMPLE VULVECTOMY STITCH AT 12\"OCLOCK Tissue VULVA PARTIAL VULVECTOMY SURGICAL PATHOLOGY EXAM Adina Navarro MD [facility] 3/18/2025 1459   5 : RIGHT VULVECTOMY INFERIOR MARGIN Tissue VULVA PARTIAL VULVECTOMY SURGICAL PATHOLOGY EXAM Adina Navarro MD [facility] 3/18/2025 1511   6 : LEFT RADICAL VULVECTOMY STITCH @12 Tissue VULVA RADICAL VULVECTOMY SURGICAL PATHOLOGY EXAM Adina Navarro MD [facility] 3/18/2025 1544                 Drains and/or Catheters:   Urethral Catheter Non-latex 16 Fr. (Active)       Tourniquet Times:         Implants:     Findings: Clinically negative inguinofemoral lymph nodes bilaterally. Bilateral inguinofemoral sentinel lymph nodes green, not hot. Extensive lichen sclerosus with fused labia minora, clitoral scarring. No perianal disease. No occult tumor identified.     Indications: Vanessa Son \"Jodie\" is an 75 y.o. female who is having surgery for Vulvar cancer (Multi) [C51.9].     The patient was seen in the preoperative area. The risks, benefits, complications, treatment options, non-operative alternatives, expected recovery and outcomes were discussed with the patient. The possibilities of reaction to medication, " pulmonary aspiration, injury to surrounding structures, bleeding, recurrent infection, the need for additional procedures, failure to diagnose a condition, and creating a complication requiring transfusion or operation were discussed with the patient. The patient concurred with the proposed plan, giving informed consent.  The site of surgery was properly noted/marked if necessary per policy. The patient has been actively warmed in preoperative area. Preoperative antibiotics have been ordered and given within 1 hours of incision. Venous thrombosis prophylaxis have been ordered including bilateral sequential compression devices and chemical prophylaxis    Procedure Details:   Patient previously met in nuclear medicine for technetium 99 injection circumferentially within the dermis adjacent to the scar. Injection at 2, 5, 8 and 11 o'clock. Consent was completed in pre-op holding area after discussing all risks/benefits/alternatives. Pt was taken to OR with IV in place. Team huddle and timeout were performed with all appropriate team members.  SCDs were in placed and turned on.  She received heparin pre-operatively. Anesthesia was induced without difficulty.  Antibiotics were administered.  Pt was placed in dorsal lithotomy position in yobany stirrups and prepped and draped in the usual sterile fashion for vulva and groin procedures.     A Godoy catheter was placed.     Attention was turned to the vulva.  The scar on the left was identified. Indocyanine green was injected at the edge of the scar at 2 o'clock, 5 o'clock, 8 o'clock and 11 o'clock, with 1 cc approximately just beneath the dermis. Attention was turned to the groins after donning new sterile gloves.  The anterior superior iliac spine and the pubic tubercle were marked with a marking pen on the left.  A straight line was made connecting these, over the inguinal ligament.  The femoral triangle was outlined.  This was repeated on the right.  The femoral arteries  bilaterally were palpated.  No firm/fixed nodes were palpable. An incision was made in the marked line superficial to the inguinal ligament with the scalpel bilaterally.  This was taken down to the level of the fascia with cautery bilaterally.  The femoral artery was then palpable bilaterally.  Care was taken to evaluate medial to the femoral artery, in the region of expected lymph nodes.  The laparoscopic camera was then turned onto ICG mode and a bright green lymph node was quickly identified in the right groin followed by the left.  Attention was turned back to the right groin, we ensured that we were grasping the correct bright green node on the laparoscope and then the laparoscope was turned off.  We dissected this lymph node out with cautery and blunt dissection.  The saphenous vein was preserved. Once freed from all attachments, the node was viewed under the laparoscopic camera and confirmed to be the sentinel/bright green.  This was sent to pathology for permanent section.  This same procedure was repeated on the left without difficulty.  Additional petey tissue was removed on the left that was green but not hot. No nodes were fixed to any vessels. The incisions were then closed in two layers with a running 3-0 vicryl suture first followed by  a running 3-0 Monocryl layer.  The incisions were cleaned and dressings were placed.    We then turned our attention to the vulva.  Lesion as discussed in findings was noted at the right labia majora.  The skin surrounding the lesion was marked in an ellipse shape with 1cm.  Simple vulvectomy was performed on the right. The edges were outlined with the point tip bovie device and the edges were grasped with an allis clamp and carried down to the fascia with the bovie. The specimen was approximately 2x5 cm long prior to incision.  Bovie was used to release the simple vulvectomy specimen from the subcutaneous layer. A stitch was placed at the 12 o'clock position and the  specimen was sent off for permanent. We then turned our attention to the left for the radical vulvectomy. The skin surrounding the scar was marked in an ellipse shape with 2cm margins from the scar and injected with marcaine. The edges were outlined with the point tip bovie device and the edges were grasped with an allis clamp and carried down to the fascia with the bovie.  Bovie cautery was used for hemostasis.  The specimen was approximately 3 x 8 cm long prior to incision.  Bovie was used to release the radical vulvectomy specimen from all of the underlying attachments, caution was taken near the anus and the sphincter. Sphincter complex and anus were preserved.  The specimen was tagged with a stitch at 12 o'clock.      New gloves were donned and the vulvar incision was reapproximated in two layers, first with deep stitches of 2-0 vicryl followed by multiple interrupted sutures with 3-0 vicryl to re-approximate the skin.  All edges were able to be reapproximated without any remaining defects.  The incision was cleaned, dried and bacitracin was applied.      Sponge/lap/needle/instrument counts were correct x 2.      The patient tolerated the procedure well and was taken to PACU in stable condition.    Complications:  None; patient tolerated the procedure well.    Disposition: PACU - hemodynamically stable.  Condition: stable         Attending Attestation: I was present and scrubbed for the entire procedure.    Adina Navarro  Phone Number: [phone]

## 2025-03-18 NOTE — ANESTHESIA POSTPROCEDURE EVALUATION
"Patient: Vanessa Son \"Jodie\"    Procedure Summary       Date: 03/18/25 Room / Location: [facility] OR 03 / Virtual [facility] OR    Anesthesia Start: 1248 Anesthesia Stop: 1650    Procedure: Left radical vulvectomy bilateral inguinofemoral sentinel lymph node biopsy any indicated procedures (Bilateral) Diagnosis:       Vulvar cancer (Multi)      (Vulvar cancer (Multi) [C51.9])    Surgeons: Adina Navarro MD MPH Responsible Provider: Domenico Goins MD    Anesthesia Type: general ASA Status: 3            Anesthesia Type: general    Vitals Value Taken Time   /70 03/18/25 1647   Temp  03/18/25 1703   Pulse 81 03/18/25 1701   Resp 4 03/18/25 1658   SpO2 97 % 03/18/25 1701   Vitals shown include unfiled device data.    Anesthesia Post Evaluation    Patient location during evaluation: PACU  Patient participation: complete - patient participated  Level of consciousness: awake and alert  Pain score: 2  Pain management: adequate  Airway patency: patent  Cardiovascular status: acceptable  Respiratory status: acceptable  Hydration status: acceptable  Postoperative Nausea and Vomiting: mild      There were no known notable events for this encounter.    "

## 2025-03-19 ENCOUNTER — PHARMACY VISIT (OUTPATIENT)
Dept: PHARMACY | Facility: CLINIC | Age: 75
End: 2025-03-19
Payer: COMMERCIAL

## 2025-03-19 VITALS
OXYGEN SATURATION: 99 % | BODY MASS INDEX: 40.75 KG/M2 | TEMPERATURE: 97 F | DIASTOLIC BLOOD PRESSURE: 69 MMHG | HEIGHT: 63 IN | SYSTOLIC BLOOD PRESSURE: 131 MMHG | RESPIRATION RATE: 16 BRPM | HEART RATE: 64 BPM | WEIGHT: 230 LBS

## 2025-03-19 LAB
ANION GAP SERPL CALC-SCNC: 15 MMOL/L (ref 10–20)
BUN SERPL-MCNC: 36 MG/DL (ref 6–23)
CALCIUM SERPL-MCNC: 9.3 MG/DL (ref 8.6–10.6)
CHLORIDE SERPL-SCNC: 96 MMOL/L (ref 98–107)
CO2 SERPL-SCNC: 28 MMOL/L (ref 21–32)
CREAT SERPL-MCNC: 1.17 MG/DL (ref 0.5–1.05)
EGFRCR SERPLBLD CKD-EPI 2021: 49 ML/MIN/1.73M*2
ERYTHROCYTE [DISTWIDTH] IN BLOOD BY AUTOMATED COUNT: 13.2 % (ref 11.5–14.5)
GLUCOSE BLD MANUAL STRIP-MCNC: 261 MG/DL (ref 74–99)
GLUCOSE BLD MANUAL STRIP-MCNC: 268 MG/DL (ref 74–99)
GLUCOSE SERPL-MCNC: 249 MG/DL (ref 74–99)
HCT VFR BLD AUTO: 34.7 % (ref 36–46)
HGB BLD-MCNC: 12.5 G/DL (ref 12–16)
MAGNESIUM SERPL-MCNC: 1.83 MG/DL (ref 1.6–2.4)
MCH RBC QN AUTO: 30.1 PG (ref 26–34)
MCHC RBC AUTO-ENTMCNC: 36 G/DL (ref 32–36)
MCV RBC AUTO: 84 FL (ref 80–100)
NRBC BLD-RTO: 0 /100 WBCS (ref 0–0)
PLATELET # BLD AUTO: 155 X10*3/UL (ref 150–450)
POTASSIUM SERPL-SCNC: 4.5 MMOL/L (ref 3.5–5.3)
RBC # BLD AUTO: 4.15 X10*6/UL (ref 4–5.2)
SODIUM SERPL-SCNC: 134 MMOL/L (ref 136–145)
WBC # BLD AUTO: 8 X10*3/UL (ref 4.4–11.3)

## 2025-03-19 PROCEDURE — 36415 COLL VENOUS BLD VENIPUNCTURE: CPT | Performed by: STUDENT IN AN ORGANIZED HEALTH CARE EDUCATION/TRAINING PROGRAM

## 2025-03-19 PROCEDURE — 82947 ASSAY GLUCOSE BLOOD QUANT: CPT

## 2025-03-19 PROCEDURE — 2500000002 HC RX 250 W HCPCS SELF ADMINISTERED DRUGS (ALT 637 FOR MEDICARE OP, ALT 636 FOR OP/ED)

## 2025-03-19 PROCEDURE — 82374 ASSAY BLOOD CARBON DIOXIDE: CPT | Performed by: STUDENT IN AN ORGANIZED HEALTH CARE EDUCATION/TRAINING PROGRAM

## 2025-03-19 PROCEDURE — 83735 ASSAY OF MAGNESIUM: CPT | Performed by: STUDENT IN AN ORGANIZED HEALTH CARE EDUCATION/TRAINING PROGRAM

## 2025-03-19 PROCEDURE — 2500000001 HC RX 250 WO HCPCS SELF ADMINISTERED DRUGS (ALT 637 FOR MEDICARE OP)

## 2025-03-19 PROCEDURE — 85027 COMPLETE CBC AUTOMATED: CPT | Performed by: STUDENT IN AN ORGANIZED HEALTH CARE EDUCATION/TRAINING PROGRAM

## 2025-03-19 PROCEDURE — 2500000004 HC RX 250 GENERAL PHARMACY W/ HCPCS (ALT 636 FOR OP/ED)

## 2025-03-19 RX ADMIN — HEPARIN SODIUM 7500 UNITS: 5000 INJECTION, SOLUTION INTRAVENOUS; SUBCUTANEOUS at 06:43

## 2025-03-19 RX ADMIN — AMLODIPINE BESYLATE 5 MG: 5 TABLET ORAL at 09:33

## 2025-03-19 RX ADMIN — INSULIN LISPRO 3 UNITS: 100 INJECTION, SOLUTION INTRAVENOUS; SUBCUTANEOUS at 06:49

## 2025-03-19 RX ADMIN — ACETAMINOPHEN 975 MG: 325 TABLET ORAL at 06:43

## 2025-03-19 RX ADMIN — METOPROLOL TARTRATE 100 MG: 100 TABLET, FILM COATED ORAL at 09:33

## 2025-03-19 SDOH — ECONOMIC STABILITY: FOOD INSECURITY
WITHIN THE PAST 12 MONTHS, YOU WORRIED THAT YOUR FOOD WOULD RUN OUT BEFORE YOU GOT THE MONEY TO BUY MORE.: PATIENT DECLINED

## 2025-03-19 SDOH — ECONOMIC STABILITY: HOUSING INSECURITY: IN THE LAST 12 MONTHS, WAS THERE A TIME WHEN YOU WERE NOT ABLE TO PAY THE MORTGAGE OR RENT ON TIME?: NO

## 2025-03-19 SDOH — SOCIAL STABILITY: SOCIAL INSECURITY
WITHIN THE LAST YEAR, HAVE YOU BEEN KICKED, HIT, SLAPPED, OR OTHERWISE PHYSICALLY HURT BY YOUR PARTNER OR EX-PARTNER?: NO

## 2025-03-19 SDOH — SOCIAL STABILITY: SOCIAL INSECURITY: WITHIN THE LAST YEAR, HAVE YOU BEEN AFRAID OF YOUR PARTNER OR EX-PARTNER?: NO

## 2025-03-19 SDOH — ECONOMIC STABILITY: HOUSING INSECURITY: IN THE PAST 12 MONTHS, HOW MANY TIMES HAVE YOU MOVED WHERE YOU WERE LIVING?: 1

## 2025-03-19 SDOH — SOCIAL STABILITY: SOCIAL INSECURITY
WITHIN THE LAST YEAR, HAVE YOU BEEN RAPED OR FORCED TO HAVE ANY KIND OF SEXUAL ACTIVITY BY YOUR PARTNER OR EX-PARTNER?: NO

## 2025-03-19 SDOH — ECONOMIC STABILITY: TRANSPORTATION INSECURITY
IN THE PAST 12 MONTHS, HAS LACK OF TRANSPORTATION KEPT YOU FROM MEDICAL APPOINTMENTS OR FROM GETTING MEDICATIONS?: PATIENT DECLINED

## 2025-03-19 SDOH — SOCIAL STABILITY: SOCIAL INSECURITY: WITHIN THE LAST YEAR, HAVE YOU BEEN HUMILIATED OR EMOTIONALLY ABUSED IN OTHER WAYS BY YOUR PARTNER OR EX-PARTNER?: NO

## 2025-03-19 SDOH — ECONOMIC STABILITY: INCOME INSECURITY
IN THE PAST 12 MONTHS HAS THE ELECTRIC, GAS, OIL, OR WATER COMPANY THREATENED TO SHUT OFF SERVICES IN YOUR HOME?: PATIENT DECLINED

## 2025-03-19 SDOH — ECONOMIC STABILITY: HOUSING INSECURITY: AT ANY TIME IN THE PAST 12 MONTHS, WERE YOU HOMELESS OR LIVING IN A SHELTER (INCLUDING NOW)?: PATIENT DECLINED

## 2025-03-19 SDOH — ECONOMIC STABILITY: FOOD INSECURITY: HOW HARD IS IT FOR YOU TO PAY FOR THE VERY BASICS LIKE FOOD, HOUSING, MEDICAL CARE, AND HEATING?: PATIENT DECLINED

## 2025-03-19 SDOH — ECONOMIC STABILITY: FOOD INSECURITY: WITHIN THE PAST 12 MONTHS, THE FOOD YOU BOUGHT JUST DIDN'T LAST AND YOU DIDN'T HAVE MONEY TO GET MORE.: PATIENT DECLINED

## 2025-03-19 ASSESSMENT — COGNITIVE AND FUNCTIONAL STATUS - GENERAL
DAILY ACTIVITIY SCORE: 24
CLIMB 3 TO 5 STEPS WITH RAILING: A LITTLE
WALKING IN HOSPITAL ROOM: A LITTLE
MOBILITY SCORE: 22
CLIMB 3 TO 5 STEPS WITH RAILING: A LITTLE
PATIENT BASELINE BEDBOUND: NO
MOBILITY SCORE: 23
DAILY ACTIVITIY SCORE: 24

## 2025-03-19 ASSESSMENT — ACTIVITIES OF DAILY LIVING (ADL): LACK_OF_TRANSPORTATION: PATIENT DECLINED

## 2025-03-19 ASSESSMENT — PAIN - FUNCTIONAL ASSESSMENT: PAIN_FUNCTIONAL_ASSESSMENT: 0-10

## 2025-03-19 ASSESSMENT — PAIN SCALES - GENERAL: PAINLEVEL_OUTOF10: 1

## 2025-03-19 NOTE — NURSING NOTE
Discharge Note:    Pt discharged to home. Instructions provided and reviewed with patient and . PIVs removed with catheter tip intact. [id number] provided medications. Transport to [facility].    Heather Castaneda RN

## 2025-03-19 NOTE — DISCHARGE SUMMARY
Discharge Diagnosis  Vulvar cancer (Multi)    Issues Requiring Follow-Up  Pathology Review, POV    Test Results Pending At Discharge  Pending Labs       Order Current Status    Surgical Pathology Exam In process            Hospital Course  Patient was admitted on 3/18 for scheduled surgery. She underwent a L radical vulvectomy, R simple vulvectomy, bilateral inguinofemoral SLNDBx. Her procedure was uncomplicated, see the operative report for full details. By POD1 she was meeting all postoperative milestones including: tolerating PO diet and medications, voiding, ambulating. Her pain was well controlled with PO pain medications. She was appropriate for discharge home and will follow up as an outpatient with Dr. Navarro .       Pertinent Physical Exam At Time of Discharge  Physical Exam  General: no acute distress, sitting at bedside  HEENT: normocephalic, atraumatic  Heart: warm and well perfused, RRR  Lungs: no increased WOB, CTAB  Abd: soft, nontender, groin incisions covered in dermabond, well-approximated, clean.   : Vulvar incisions hemostatic, well-approximated, no erythema or induration noted. Godoy in place draining yellow urine  Extremities: moving all extremities  Neuro: awake and conversant  Psych: appropriate mood and affect     Home Medications     Medication List      START taking these medications     acetaminophen 500 mg tablet; Commonly known as: Tylenol Extra Strength;   Take 2 tablets (1,000 mg) by mouth every 6 hours if needed for mild pain   (1 - 3) for up to 14 days.   apixaban 2.5 mg tablet; Commonly known as: Eliquis; Take 1 tablet (2.5   mg) by mouth 2 times a day.   ibuprofen 600 mg tablet; Take 1 tablet (600 mg) by mouth every 6 hours   for 14 days.   oxyCODONE 5 mg immediate release tablet; Commonly known as: Roxicodone;   Take 1 tablet (5 mg) by mouth every 4 hours if needed for severe pain (7 -   10).   polyethylene glycol 17 gram/dose powder; Commonly known as: Glycolax,   Miralax; Mix  "17 g of powder and drink once daily for 7 days.     CONTINUE taking these medications     amLODIPine 5 mg tablet; Commonly known as: Norvasc; Take 1 tablet (5 mg)   by mouth once daily.   aspirin 81 mg EC tablet   cholecalciferol 5,000 Units tablet; Commonly known as: Vitamin D-3   HumaLOG KwikPen Insulin 100 unit/mL pen; Generic drug: insulin lispro;   INJECT 12 UNITS SUBCUTANEOUSLY 3 TIMES DAILY WITH MEALS   indapamide 1.25 mg tablet; Commonly known as: Lozol; Take 1 tablet (1.25   mg) by mouth once daily in the evening.   KELP ORAL   lancets 33 gauge misc; Test 3 times daily   Lantus Solostar U-100 Insulin 100 unit/mL (3 mL) pen; Generic drug:   insulin glargine; INJECT SUBCUTANEOUSLY 100 UNITS  DAILY   losartan-hydrochlorothiazide 50-12.5 mg tablet; Commonly known as:   Hyzaar; Take 0.5 tablets by mouth once daily in the evening.   magnesium 250 mg tablet   metoprolol tartrate 100 mg tablet; Commonly known as: Lopressor; Take 1   tablet (100 mg) by mouth 2 times a day.   omega 3-dha-epa-fish oil 450 mg (128 mg- 322 mg)-650 mg capsule,delayed   release(DR/EC)   OneTouch Ultra Test strip; Generic drug: blood sugar diagnostic; TEST 3   TIMES DAILY   pen needle, diabetic 31 gauge x 3/16\" needle; Use 5 daily to inject   insulin   potassium gluconate 595 mg (99 mg) tablet   [place]'s wort 300 mg capsule   Victoza 3-Abiel 0.6 mg/0.1 mL (18 mg/3 mL) injection; Generic drug:   liraglutide; INJECT SUBCUTANEOUSLY 1.8 MG  DAILY     STOP taking these medications     clobetasol 0.05 % ointment; Commonly known as: Temovate     ASK your doctor about these medications     colchicine 0.6 mg tablet; TAKE 1 TABLET BY MOUTH DAILY AS  NEEDED FOR   GOUT       Outpatient Follow-Up  Future Appointments   Date Time Provider Department Center   4/9/2025  3:00 PM Adina Navarro MD MPH [id number] [place]   7/15/2025  9:30 AM Lalitha Ramirez MD [id number] [place]     D/w Dr. Dusty Argueta MD  "

## 2025-03-19 NOTE — PROGRESS NOTES
"Vanessa Son \"Valerie" is a 75 y.o. female on day 1 of admission presenting with Vulvar cancer (Multi).    Subjective   Pt feeling well, ambulated around floor this AM. Tolerating PO without nausea. Pain well controlled, has not noticed bleeding.        Objective     Physical Exam  General: no acute distress, sitting at bedside  HEENT: normocephalic, atraumatic  Heart: warm and well perfused, RRR  Lungs: no increased WOB, CTAB  Abd: soft, nontender, groin incisions covered in dermabond, well-approximated, clean.   : Vulvar incisions hemostatic, well-approximated, no erythema or induration noted. Godoy in place draining yellow urine  Extremities: moving all extremities  Neuro: awake and conversant  Psych: appropriate mood and affect     Last Recorded Vitals  Blood pressure 121/69, pulse 61, temperature 36.1 °C (97 °F), temperature source Temporal, resp. rate 16, height 1.6 m (5' 3\"), weight 104 kg (230 lb), SpO2 95%.  Intake/Output last 3 Shifts:  I/O last 3 completed shifts:  In: 108.3 (1 mL/kg) [I.V.:108.3 (1 mL/kg)]  Out: 90 (0.9 mL/kg) [Urine:40 (0 mL/kg/hr); Blood:50]  Weight: 104.3 kg     Relevant Results  Lab Results   Component Value Date    WBC 5.3 03/11/2025    HGB 13.3 03/11/2025    HCT 38.8 03/11/2025    MCV 89 03/11/2025     03/11/2025      Lab Results   Component Value Date    GLUCOSE 107 (H) 03/11/2025    CALCIUM 9.9 03/11/2025     03/11/2025    K 4.1 03/11/2025    CO2 31 03/11/2025     03/11/2025    BUN 26 (H) 03/11/2025    CREATININE 0.83 03/11/2025          Assessment/Plan   Assessment & Plan  Vulvar cancer (Multi)    H/O radical vulvectomy    Postoperative pain    76 yo with 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 Assessment of pad reassuring  - Stable on " room air, encourage incentive spirometry 10x/hr while awake  - Regular diet as tolerated, IVF discontinued   - Bowel regimen: daily Miralax   - Godoy in place. Will remove today   - DVT ppx: SCDs, ambulation and heparin ppx. For 30 days of ppx Eliquis upon discharge      Comorbidities  - HTN: cont home amlodipine, metoprolol, hold home indapamide, losartan-HCTZ  - T2DM: hold home Victoza, Lantus/Humalog. POCT + SSI      To be d/w Dr. Dusty Argueta MD  PGY3, Gynecologic Oncology  Pager [phone]

## 2025-03-26 ENCOUNTER — OFFICE VISIT (OUTPATIENT)
Dept: GYNECOLOGIC ONCOLOGY | Facility: CLINIC | Age: 75
End: 2025-03-26
Payer: MEDICARE

## 2025-03-26 VITALS
DIASTOLIC BLOOD PRESSURE: 76 MMHG | HEART RATE: 86 BPM | SYSTOLIC BLOOD PRESSURE: 147 MMHG | RESPIRATION RATE: 17 BRPM | BODY MASS INDEX: 41.32 KG/M2 | OXYGEN SATURATION: 96 % | WEIGHT: 233.25 LBS | TEMPERATURE: 97.7 F

## 2025-03-26 DIAGNOSIS — C51.9 VULVAR CANCER (MULTI): Primary | ICD-10-CM

## 2025-03-26 DIAGNOSIS — Z98.890 POST-OPERATIVE STATE: ICD-10-CM

## 2025-03-26 DIAGNOSIS — L90.0 LICHEN SCLEROSUS: ICD-10-CM

## 2025-03-26 PROCEDURE — 3077F SYST BP >= 140 MM HG: CPT | Performed by: STUDENT IN AN ORGANIZED HEALTH CARE EDUCATION/TRAINING PROGRAM

## 2025-03-26 PROCEDURE — 1159F MED LIST DOCD IN RCRD: CPT | Performed by: STUDENT IN AN ORGANIZED HEALTH CARE EDUCATION/TRAINING PROGRAM

## 2025-03-26 PROCEDURE — 3044F HG A1C LEVEL LT 7.0%: CPT | Performed by: STUDENT IN AN ORGANIZED HEALTH CARE EDUCATION/TRAINING PROGRAM

## 2025-03-26 PROCEDURE — 3078F DIAST BP <80 MM HG: CPT | Performed by: STUDENT IN AN ORGANIZED HEALTH CARE EDUCATION/TRAINING PROGRAM

## 2025-03-26 PROCEDURE — 1160F RVW MEDS BY RX/DR IN RCRD: CPT | Performed by: STUDENT IN AN ORGANIZED HEALTH CARE EDUCATION/TRAINING PROGRAM

## 2025-03-26 PROCEDURE — 99211 OFF/OP EST MAY X REQ PHY/QHP: CPT | Performed by: STUDENT IN AN ORGANIZED HEALTH CARE EDUCATION/TRAINING PROGRAM

## 2025-03-26 PROCEDURE — 3060F POS MICROALBUMINURIA REV: CPT | Performed by: STUDENT IN AN ORGANIZED HEALTH CARE EDUCATION/TRAINING PROGRAM

## 2025-03-26 PROCEDURE — 3049F LDL-C 100-129 MG/DL: CPT | Performed by: STUDENT IN AN ORGANIZED HEALTH CARE EDUCATION/TRAINING PROGRAM

## 2025-03-26 PROCEDURE — 1126F AMNT PAIN NOTED NONE PRSNT: CPT | Performed by: STUDENT IN AN ORGANIZED HEALTH CARE EDUCATION/TRAINING PROGRAM

## 2025-03-26 PROCEDURE — 1111F DSCHRG MED/CURRENT MED MERGE: CPT | Performed by: STUDENT IN AN ORGANIZED HEALTH CARE EDUCATION/TRAINING PROGRAM

## 2025-03-26 ASSESSMENT — PAIN SCALES - GENERAL: PAINLEVEL_OUTOF10: 0-NO PAIN

## 2025-03-26 NOTE — PROGRESS NOTES
Patient ID: Jodie Son is a 75 y.o. female.  Referring Physician: No referring provider defined for this encounter.  Primary Care Provider: Lalitah Ramirez MD    Subjective    Here for wound check s/p modified radical vulvectomy L and simple vulvectomy R bilateral sentinel inguinal femoral lymph node dissection. Pathology pending.She is doing well. Denies fever, chills. Voiding without difficulty. No constipation. Denies purulence, discharge, vaginal bleeding.     Objective    BSA: 2.17 meters squared  /76   Pulse 86   Temp 36.5 °C (97.7 °F)   Resp 17   Wt 106 kg (233 lb 4 oz)   LMP  (LMP Unknown)   SpO2 96%   BMI 41.32 kg/m²      Physical Exam  Vitals and nursing note reviewed. Exam conducted with a chaperone present.   Constitutional:       General: She is not in acute distress.     Appearance: Normal appearance.   HENT:      Head: Normocephalic and atraumatic.      Mouth/Throat:      Mouth: Mucous membranes are moist.      Pharynx: No oropharyngeal exudate or posterior oropharyngeal erythema.   Eyes:      Extraocular Movements: Extraocular movements intact.      Conjunctiva/sclera: Conjunctivae normal.      Pupils: Pupils are equal, round, and reactive to light.   Neck:      Thyroid: No thyroid mass or thyromegaly.   Cardiovascular:      Rate and Rhythm: Normal rate and regular rhythm.      Pulses: Normal pulses.      Heart sounds: Normal heart sounds.   Pulmonary:      Effort: Pulmonary effort is normal.      Breath sounds: Normal breath sounds. No wheezing, rhonchi or rales.   Abdominal:      General: Bowel sounds are normal. There is no distension.      Palpations: Abdomen is soft. There is no mass.      Tenderness: There is no abdominal tenderness.      Hernia: No hernia is present.   Genitourinary:     Comments: Suture intact, fibrinous deposition R vulva, L vulva c/d/i  Musculoskeletal:         General: No tenderness. Normal range of motion.      Cervical back: Normal range of motion  and neck supple. No tenderness.      Right lower leg: No edema.      Left lower leg: No edema.   Skin:     General: Skin is warm.      Findings: No lesion or rash.   Neurological:      General: No focal deficit present.      Mental Status: She is alert and oriented to person, place, and time.   Psychiatric:         Mood and Affect: Mood normal.         Behavior: Behavior normal.         Performance Status:  Asymptomatic    Assessment/Plan     Oncology History Overview Note   10/30/24 R vulva  granulation tissue, L vulva LS  11/22/24 L vulvectomy invasive SCC, sarcomatoid pattern, dVIN, LS positive margins at 12 and 6 o'clock and within 1 mm of of the deep margin with at least 5 mm DOI consistent with stage IB, R  vulvectomy foci suspicious for superficially invasive SCC approximately 1 mm, HP independent, dVIN and LS  Tumor board reexcision L vulva, bilateral inguinofemoral SLND  3/18/25 modified radical vulvectomy L and simple vulvectomy R bilateral sentinel inguinal femoral lymph node dissection  Pathology pending       Vulvar cancer (Multi)   1/31/2025 Initial Diagnosis    Vulvar cancer (Multi)          Diagnoses and all orders for this visit:  Vulvar cancer (Multi)  Lichen sclerosus  Post-operative state  # Vulvar cancer  - Discussed pathogenesis of vulvar cancer, we discussed hers is due to vulvar dermatoses   - Pathology pending, will follow up    # wound check   - No concern for infection, continue sabas care as instructed    # LS   - will need ultra potent topical steroid once recovered from surgery    RTC 3 weeks for pathology review    Adina Navarro MD MPH

## 2025-04-04 ENCOUNTER — APPOINTMENT (OUTPATIENT)
Dept: HEMATOLOGY/ONCOLOGY | Facility: HOSPITAL | Age: 75
End: 2025-04-04
Payer: MEDICARE

## 2025-04-09 ENCOUNTER — APPOINTMENT (OUTPATIENT)
Dept: GYNECOLOGIC ONCOLOGY | Facility: CLINIC | Age: 75
End: 2025-04-09
Payer: MEDICARE

## 2025-04-09 LAB
LAB AP ASR DISCLAIMER: NORMAL
LABORATORY COMMENT REPORT: NORMAL
PATH REPORT.FINAL DX SPEC: NORMAL
PATH REPORT.GROSS SPEC: NORMAL
PATH REPORT.RELEVANT HX SPEC: NORMAL
PATH REPORT.TOTAL CANCER: NORMAL

## 2025-04-09 PROCEDURE — 88342 IMHCHEM/IMCYTCHM 1ST ANTB: CPT | Performed by: PATHOLOGY

## 2025-04-09 PROCEDURE — 88341 IMHCHEM/IMCYTCHM EA ADD ANTB: CPT | Performed by: PATHOLOGY

## 2025-04-10 ENCOUNTER — APPOINTMENT (OUTPATIENT)
Dept: GYNECOLOGIC ONCOLOGY | Facility: HOSPITAL | Age: 75
End: 2025-04-10
Payer: MEDICARE

## 2025-04-10 NOTE — TUMOR BOARD NOTE
Gynecologic Oncology Tumor Board 2025    Specialties Present: Gyn Onc, Radiology, Genetics, Radiation oncology, Pathology, Nurse Navigator, Research    Vanessa Son  75 y.o.    1950  MRN [id number]    Provider: Adina Navarro MD  Disease Site: Vulvar  Pathology:   Vulvectomy (24): Invasive squamous cell carcinoma, sarcomatoid pattern, HPV-independent; lichen sclerosus  Vulvectomy and Inguinofemoral [place] Lymph Node Biopsy (3/18/25): No residual carcinoma, lichen sclerosis; lymph node excision without petey tissue  Grade: N/A  Stage: IB    We reviewed previous medical and familial history, history of present illness, and recent lab results along with all available histopathologic and imaging studies. The tumor board considered available treatment options and made the following recommendations.    Recommendations: Discuss observation vs. bilateral full lymphadenectomy.           Clinical Trial Consideration: None Available    National site-specific guidelines were discussed with respect to the case. It is recognized that there may be additional factors not discussed in the Review Board discussion that can influence management decisions, and alternative management options which fall within the standard of care. Accordingly the final treatment disposition will be determined by the patient, in the context of an informed discussion with their providers. The actual prescribed management or treatment plan may or may not be consistent with the Review Board recommendations.

## 2025-04-11 ENCOUNTER — TUMOR BOARD CONFERENCE (OUTPATIENT)
Dept: HEMATOLOGY/ONCOLOGY | Facility: HOSPITAL | Age: 75
End: 2025-04-11
Payer: MEDICARE

## 2025-04-16 ENCOUNTER — OFFICE VISIT (OUTPATIENT)
Dept: GYNECOLOGIC ONCOLOGY | Facility: CLINIC | Age: 75
End: 2025-04-16
Payer: MEDICARE

## 2025-04-16 VITALS
RESPIRATION RATE: 17 BRPM | DIASTOLIC BLOOD PRESSURE: 81 MMHG | OXYGEN SATURATION: 97 % | WEIGHT: 239.64 LBS | BODY MASS INDEX: 42.45 KG/M2 | TEMPERATURE: 96.8 F | SYSTOLIC BLOOD PRESSURE: 167 MMHG | HEART RATE: 68 BPM

## 2025-04-16 DIAGNOSIS — L90.0 LICHEN SCLEROSUS: ICD-10-CM

## 2025-04-16 DIAGNOSIS — C51.9 VULVAR CANCER (MULTI): Primary | ICD-10-CM

## 2025-04-16 DIAGNOSIS — Z48.89 ENCOUNTER FOR POST SURGICAL WOUND CHECK: ICD-10-CM

## 2025-04-16 PROCEDURE — 99214 OFFICE O/P EST MOD 30 MIN: CPT | Performed by: STUDENT IN AN ORGANIZED HEALTH CARE EDUCATION/TRAINING PROGRAM

## 2025-04-16 ASSESSMENT — PAIN SCALES - GENERAL: PAINLEVEL_OUTOF10: 0-NO PAIN

## 2025-04-16 NOTE — PROGRESS NOTES
Patient ID: Jodie Son is a 75 y.o. female.  Referring Physician: No referring provider defined for this encounter.  Primary Care Provider: Lalitha Ramirez MD    Subjective    Here for pathology review. Report of bilateral LE swelling, feet swelling mostly. Has experienced in the past. Self resolved in the past. Denies LE pain, erythema. Wondering if related to surgery. However, no definitive LN tissue observed so unlikely. Does report vulvar discomfort up until what seemed like last stitch fell out. Symptoms much improved now. Continues to report scant vaginal bleeding and yellow tinge discharge. No odor.     Objective    BSA: 2.2 meters squared  /81   Pulse 68   Temp 36 °C (96.8 °F)   Resp 17   Wt 109 kg (239 lb 10.2 oz)   LMP  (LMP Unknown)   SpO2 97%   BMI 42.45 kg/m²      Physical Exam  Vitals and nursing note reviewed. Exam conducted with a chaperone present.   Constitutional:       General: She is not in acute distress.     Appearance: Normal appearance.   HENT:      Head: Normocephalic and atraumatic.      Mouth/Throat:      Mouth: Mucous membranes are moist.      Pharynx: No oropharyngeal exudate or posterior oropharyngeal erythema.   Eyes:      Extraocular Movements: Extraocular movements intact.      Conjunctiva/sclera: Conjunctivae normal.      Pupils: Pupils are equal, round, and reactive to light.   Neck:      Thyroid: No thyroid mass or thyromegaly.   Cardiovascular:      Rate and Rhythm: Normal rate and regular rhythm.      Pulses: Normal pulses.      Heart sounds: Normal heart sounds.   Pulmonary:      Effort: Pulmonary effort is normal.      Breath sounds: Normal breath sounds. No wheezing, rhonchi or rales.   Abdominal:      General: Bowel sounds are normal. There is no distension.      Palpations: Abdomen is soft. There is no mass.      Tenderness: There is no abdominal tenderness.      Hernia: No hernia is present.   Genitourinary:     Comments: R vulva has unfortunately  broken down, no signs of infection. Fibrinous deposition overlying. Will allow by secondary intention. L vulva well approximated. No gross lesions or masses. S  Musculoskeletal:         General: No tenderness. Normal range of motion.      Cervical back: Normal range of motion and neck supple. No tenderness.      Right lower leg: No edema.      Left lower leg: No edema.   Skin:     General: Skin is warm.      Findings: No lesion or rash.   Neurological:      General: No focal deficit present.      Mental Status: She is alert and oriented to person, place, and time.   Psychiatric:         Mood and Affect: Mood normal.         Behavior: Behavior normal.         Performance Status:  Asymptomatic    Assessment/Plan     Oncology History Overview Note   10/30/24 R vulva  granulation tissue, L vulva LS  11/22/24 L vulvectomy invasive SCC, sarcomatoid pattern, dVIN, LS positive margins at 12 and 6 o'clock and within 1 mm of of the deep margin with at least 5 mm DOI consistent with stage IB, R  vulvectomy foci suspicious for superficially invasive SCC approximately 1 mm, HP independent, dVIN and LS  Tumor board reexcision L vulva, bilateral inguinofemoral SLND  3/18/25 modified radical vulvectomy L and simple vulvectomy R bilateral sentinel inguinal femoral lymph node dissection  Pathology no residual invasive carcinoma, LS present, LN without petey tissue identified, no tumor consistent with stage IB       Vulvar cancer (Multi)   1/31/2025 Initial Diagnosis    Vulvar cancer (Multi)          Diagnoses and all orders for this visit:  Vulvar cancer (Multi)  Lichen sclerosus  Post-operative state  # Vulvar cancer  - Discussed pathogenesis of vulvar cancer, we discussed hers is due to vulvar dermatoses   - Pathology reviewed, no residual carcinoma involving the vulva and unfortunately no lymph node tissue observed on review of bilateral SLN  - Discussed TB recommendation, full bilateral lymphadenectomy versus observation with  patient elected for observation; concern for BLLE edema at present and acute worsening with further LN removal, as well as undergoing repeat surgery  - Anticipate close surveillance with LN ultrasound prior    # wound check   - No concern for infection, continue sabas care as instructed  - Exam R vulva with superficial dehiscence, will allow to heal by secondary intention    # LS   - will need ultra potent topical steroid once recovered from surgery, discuss further on follow up    RTC 3 months    Adina Navarro MD MPH

## 2025-04-23 DIAGNOSIS — I10 ESSENTIAL HYPERTENSION: ICD-10-CM

## 2025-04-23 RX ORDER — LOSARTAN POTASSIUM AND HYDROCHLOROTHIAZIDE 12.5; 5 MG/1; MG/1
0.5 TABLET ORAL EVERY EVENING
Qty: 45 TABLET | Refills: 3 | Status: SHIPPED | OUTPATIENT
Start: 2025-04-23

## 2025-05-29 DIAGNOSIS — E11.22 CKD STAGE 1 DUE TO TYPE 2 DIABETES MELLITUS (MULTI): ICD-10-CM

## 2025-05-29 DIAGNOSIS — M10.9 GOUT, UNSPECIFIED CAUSE, UNSPECIFIED CHRONICITY, UNSPECIFIED SITE: ICD-10-CM

## 2025-05-29 DIAGNOSIS — N18.1 CKD STAGE 1 DUE TO TYPE 2 DIABETES MELLITUS (MULTI): ICD-10-CM

## 2025-05-30 RX ORDER — COLCHICINE 0.6 MG/1
TABLET ORAL
Qty: 90 TABLET | Refills: 3 | Status: SHIPPED | OUTPATIENT
Start: 2025-05-30

## 2025-05-30 RX ORDER — INSULIN GLARGINE 100 [IU]/ML
INJECTION, SOLUTION SUBCUTANEOUS
Qty: 90 ML | Refills: 3 | Status: SHIPPED | OUTPATIENT
Start: 2025-05-30

## 2025-06-23 DIAGNOSIS — I10 ESSENTIAL HYPERTENSION: ICD-10-CM

## 2025-06-24 RX ORDER — METOPROLOL TARTRATE 100 MG/1
100 TABLET ORAL 2 TIMES DAILY
Qty: 180 TABLET | Refills: 3 | Status: SHIPPED | OUTPATIENT
Start: 2025-06-24

## 2025-06-24 RX ORDER — AMLODIPINE BESYLATE 5 MG/1
5 TABLET ORAL DAILY
Qty: 90 TABLET | Refills: 3 | Status: SHIPPED | OUTPATIENT
Start: 2025-06-24

## 2025-07-02 DIAGNOSIS — I10 ESSENTIAL HYPERTENSION: ICD-10-CM

## 2025-07-03 RX ORDER — INDAPAMIDE 1.25 MG/1
1.25 TABLET ORAL EVERY EVENING
Qty: 90 TABLET | Refills: 3 | Status: SHIPPED | OUTPATIENT
Start: 2025-07-03

## 2025-07-09 LAB
ALBUMIN SERPL-MCNC: 3.8 G/DL (ref 3.6–5.1)
ALP SERPL-CCNC: 92 U/L (ref 37–153)
ALT SERPL-CCNC: 267 U/L (ref 6–29)
ANION GAP SERPL CALCULATED.4IONS-SCNC: 7 MMOL/L (CALC) (ref 7–17)
AST SERPL-CCNC: 240 U/L (ref 10–35)
BASOPHILS # BLD AUTO: 19 CELLS/UL (ref 0–200)
BASOPHILS NFR BLD AUTO: 0.4 %
BILIRUB SERPL-MCNC: 2.4 MG/DL (ref 0.2–1.2)
BUN SERPL-MCNC: 31 MG/DL (ref 7–25)
CALCIUM SERPL-MCNC: 9.3 MG/DL (ref 8.6–10.4)
CHLORIDE SERPL-SCNC: 104 MMOL/L (ref 98–110)
CO2 SERPL-SCNC: 28 MMOL/L (ref 20–32)
CREAT SERPL-MCNC: 0.83 MG/DL (ref 0.6–1)
EGFRCR SERPLBLD CKD-EPI 2021: 74 ML/MIN/1.73M2
EOSINOPHIL # BLD AUTO: 456 CELLS/UL (ref 15–500)
EOSINOPHIL NFR BLD AUTO: 9.5 %
ERYTHROCYTE [DISTWIDTH] IN BLOOD BY AUTOMATED COUNT: 15.1 % (ref 11–15)
EST. AVERAGE GLUCOSE BLD GHB EST-MCNC: 169 MG/DL
EST. AVERAGE GLUCOSE BLD GHB EST-SCNC: 9.3 MMOL/L
GLUCOSE SERPL-MCNC: 100 MG/DL (ref 65–99)
HBA1C MFR BLD: 7.5 %
HCT VFR BLD AUTO: 37.9 % (ref 35–45)
HGB BLD-MCNC: 12.6 G/DL (ref 11.7–15.5)
LYMPHOCYTES # BLD AUTO: 1704 CELLS/UL (ref 850–3900)
LYMPHOCYTES NFR BLD AUTO: 35.5 %
MCH RBC QN AUTO: 30.6 PG (ref 27–33)
MCHC RBC AUTO-ENTMCNC: 33.2 G/DL (ref 32–36)
MCV RBC AUTO: 92 FL (ref 80–100)
MONOCYTES # BLD AUTO: 408 CELLS/UL (ref 200–950)
MONOCYTES NFR BLD AUTO: 8.5 %
NEUTROPHILS # BLD AUTO: 2213 CELLS/UL (ref 1500–7800)
NEUTROPHILS NFR BLD AUTO: 46.1 %
PLATELET # BLD AUTO: 149 THOUSAND/UL (ref 140–400)
PMV BLD REES-ECKER: 10.7 FL (ref 7.5–12.5)
POTASSIUM SERPL-SCNC: 3.8 MMOL/L (ref 3.5–5.3)
PROT SERPL-MCNC: 6.8 G/DL (ref 6.1–8.1)
RBC # BLD AUTO: 4.12 MILLION/UL (ref 3.8–5.1)
SODIUM SERPL-SCNC: 139 MMOL/L (ref 135–146)
WBC # BLD AUTO: 4.8 THOUSAND/UL (ref 3.8–10.8)

## 2025-07-10 DIAGNOSIS — R79.9 ABNORMAL BLOOD FINDINGS: Primary | ICD-10-CM

## 2025-07-10 DIAGNOSIS — R53.83 OTHER FATIGUE: ICD-10-CM

## 2025-07-10 DIAGNOSIS — R74.8 ELEVATED LIVER ENZYMES: ICD-10-CM

## 2025-07-15 ENCOUNTER — APPOINTMENT (OUTPATIENT)
Dept: PRIMARY CARE | Facility: CLINIC | Age: 75
End: 2025-07-15
Payer: MEDICARE

## 2025-07-15 VITALS
HEIGHT: 63 IN | HEART RATE: 76 BPM | RESPIRATION RATE: 16 BRPM | BODY MASS INDEX: 42.17 KG/M2 | WEIGHT: 238 LBS | OXYGEN SATURATION: 97 % | SYSTOLIC BLOOD PRESSURE: 138 MMHG | DIASTOLIC BLOOD PRESSURE: 76 MMHG | TEMPERATURE: 97.3 F

## 2025-07-15 DIAGNOSIS — L90.0 LICHEN SCLEROSUS: ICD-10-CM

## 2025-07-15 DIAGNOSIS — E78.2 MIXED HYPERLIPIDEMIA: ICD-10-CM

## 2025-07-15 DIAGNOSIS — E11.65 TYPE 2 DIABETES MELLITUS WITH HYPERGLYCEMIA, WITH LONG-TERM CURRENT USE OF INSULIN: ICD-10-CM

## 2025-07-15 DIAGNOSIS — R79.89 ELEVATED LFTS: ICD-10-CM

## 2025-07-15 DIAGNOSIS — E11.22 CKD STAGE 1 DUE TO TYPE 2 DIABETES MELLITUS (MULTI): ICD-10-CM

## 2025-07-15 DIAGNOSIS — N18.1 CKD STAGE 1 DUE TO TYPE 2 DIABETES MELLITUS (MULTI): ICD-10-CM

## 2025-07-15 DIAGNOSIS — Z79.4 TYPE 2 DIABETES MELLITUS WITH HYPERGLYCEMIA, WITH LONG-TERM CURRENT USE OF INSULIN: ICD-10-CM

## 2025-07-15 DIAGNOSIS — I10 ESSENTIAL HYPERTENSION: ICD-10-CM

## 2025-07-15 DIAGNOSIS — R74.01 ELEVATED LIVER TRANSAMINASE LEVEL: ICD-10-CM

## 2025-07-15 PROCEDURE — 3060F POS MICROALBUMINURIA REV: CPT | Performed by: FAMILY MEDICINE

## 2025-07-15 PROCEDURE — 3044F HG A1C LEVEL LT 7.0%: CPT | Performed by: FAMILY MEDICINE

## 2025-07-15 PROCEDURE — 1036F TOBACCO NON-USER: CPT | Performed by: FAMILY MEDICINE

## 2025-07-15 PROCEDURE — 99214 OFFICE O/P EST MOD 30 MIN: CPT | Performed by: FAMILY MEDICINE

## 2025-07-15 PROCEDURE — 1160F RVW MEDS BY RX/DR IN RCRD: CPT | Performed by: FAMILY MEDICINE

## 2025-07-15 PROCEDURE — 3075F SYST BP GE 130 - 139MM HG: CPT | Performed by: FAMILY MEDICINE

## 2025-07-15 PROCEDURE — 3049F LDL-C 100-129 MG/DL: CPT | Performed by: FAMILY MEDICINE

## 2025-07-15 PROCEDURE — 3008F BODY MASS INDEX DOCD: CPT | Performed by: FAMILY MEDICINE

## 2025-07-15 PROCEDURE — 1159F MED LIST DOCD IN RCRD: CPT | Performed by: FAMILY MEDICINE

## 2025-07-15 PROCEDURE — 3078F DIAST BP <80 MM HG: CPT | Performed by: FAMILY MEDICINE

## 2025-07-15 PROCEDURE — G2211 COMPLEX E/M VISIT ADD ON: HCPCS | Performed by: FAMILY MEDICINE

## 2025-07-15 NOTE — PROGRESS NOTES
Covid vax: x 2  Flu: n/a  Pneumo: UTD  RSV: declined  Shingles: declined    CRC: 9/2024-due 2029  Mammogram: declines  Pap: hysterectomy  Lmp: hysterectomy

## 2025-07-15 NOTE — PROGRESS NOTES
Chief Complaint   Patient presents with    Diabetes    Hypertension    Hyperlipidemia    Chronic Kidney Disease    Results     Liver ultrasound scheduled tomorrow  Pt was taking para purge and organic trikatu--has stopped both since liver enzymes are high     Covid vax: x 2  Flu: n/a  Pneumo: UTD  RSV: declined  Shingles: declined     CRC: 9/2024-due 2029  Mammogram: declines  Pap: hysterectomy  Lmp: hysterectomy  Stopped parapruge and trikatu  Advised [facility] brown seaweed      Vanessa Son is here for a diabetic follow up    HBA1C= 7.5    PSQ=114    Sugars most recently have been running-   HIGH & LOW <150   Activity level-     advised to increase  The patient denies history of       seizures,             heart attack or KNOWN CAD       or stroke.     No chest pain, shortness of breath, paroxysmal nocturnal dyspnea.     No nausea, vomiting, diarrhea, hematochezia or melena.      No paresthesias or headaches      No dysuria, frequency or hematuria.    Problem List[1]  Surgical History[2]  Social History[3]  Orders Only on 07/08/2025   Component Date Value Ref Range Status    GLUCOSE 07/08/2025 100 (H)  65 - 99 mg/dL Final    Comment:               Fasting reference interval     For someone without known diabetes, a glucose value  between 100 and 125 mg/dL is consistent with  prediabetes and should be confirmed with a  follow-up test.         UREA NITROGEN (BUN) 07/08/2025 31 (H)  7 - 25 mg/dL Final    CREATININE 07/08/2025 0.83  0.60 - 1.00 mg/dL Final    EGFR 07/08/2025 74  > OR = 60 mL/min/1.73m2 Final    SODIUM 07/08/2025 139  135 - 146 mmol/L Final    POTASSIUM 07/08/2025 3.8  3.5 - 5.3 mmol/L Final    CHLORIDE 07/08/2025 104  98 - 110 mmol/L Final    CARBON DIOXIDE 07/08/2025 28  20 - 32 mmol/L Final    ELECTROLYTE BALANCE 07/08/2025 7  7 - 17 mmol/L (calc) Final    CALCIUM 07/08/2025 9.3  8.6 - 10.4 mg/dL Final    PROTEIN, TOTAL 07/08/2025 6.8  6.1 - 8.1 g/dL Final    ALBUMIN 07/08/2025 3.8  3.6 -  5.1 g/dL Final    BILIRUBIN, TOTAL 07/08/2025 2.4 (H)  0.2 - 1.2 mg/dL Final    ALKALINE PHOSPHATASE 07/08/2025 92  37 - 153 U/L Final    AST 07/08/2025 240 (H)  10 - 35 U/L Final    ALT 07/08/2025 267 (H)  6 - 29 U/L Final    WHITE BLOOD CELL COUNT 07/08/2025 4.8  3.8 - 10.8 Thousand/uL Final    RED BLOOD CELL COUNT 07/08/2025 4.12  3.80 - 5.10 Million/uL Final    HEMOGLOBIN 07/08/2025 12.6  11.7 - 15.5 g/dL Final    HEMATOCRIT 07/08/2025 37.9  35.0 - 45.0 % Final    MCV 07/08/2025 92.0  80.0 - 100.0 fL Final    MCH 07/08/2025 30.6  27.0 - 33.0 pg Final    MCHC 07/08/2025 33.2  32.0 - 36.0 g/dL Final    Comment: For adults, a slight decrease in the calculated MCHC  value (in the range of 30 to 32 g/dL) is most likely  not clinically significant; however, it should be  interpreted with caution in correlation with other  red cell parameters and the patient's clinical  condition.      RDW 07/08/2025 15.1 (H)  11.0 - 15.0 % Final    PLATELET COUNT 07/08/2025 149  140 - 400 Thousand/uL Final    MPV 07/08/2025 10.7  7.5 - 12.5 fL Final    ABSOLUTE NEUTROPHILS 07/08/2025 2,213  1,500 - 7,800 cells/uL Final    ABSOLUTE LYMPHOCYTES 07/08/2025 1,704  850 - 3,900 cells/uL Final    ABSOLUTE MONOCYTES 07/08/2025 408  200 - 950 cells/uL Final    ABSOLUTE EOSINOPHILS 07/08/2025 456  15 - 500 cells/uL Final    ABSOLUTE BASOPHILS 07/08/2025 19  0 - 200 cells/uL Final    NEUTROPHILS 07/08/2025 46.1  % Final    LYMPHOCYTES 07/08/2025 35.5  % Final    MONOCYTES 07/08/2025 8.5  % Final    EOSINOPHILS 07/08/2025 9.5  % Final    BASOPHILS 07/08/2025 0.4  % Final    HEMOGLOBIN A1c 07/08/2025 7.5 (H)  <5.7 % Final    Comment: For someone without known diabetes, a hemoglobin A1c  value of 6.5% or greater indicates that they may have   diabetes and this should be confirmed with a follow-up   test.     For someone with known diabetes, a value <7% indicates   that their diabetes is well controlled and a value   greater than or equal to 7%  indicates suboptimal   control. A1c targets should be individualized based on   duration of diabetes, age, comorbid conditions, and   other considerations.     Currently, no consensus exists regarding use of  hemoglobin A1c for diagnosis of diabetes for children.          eAG (mg/dL) 07/08/2025 169  mg/dL Final    eAG (mmol/L) 07/08/2025 9.3  mmol/L Final        Health Maintenance   Topic Date Due    Diabetes: Retinopathy Screening  04/14/2024    Influenza Vaccine (1) 09/01/2025    Diabetes: Hemoglobin A1C  10/08/2025    Lipid Panel  01/06/2026    Medicare Annual Wellness Visit (AWV)  01/14/2026    Colonoscopy  09/18/2029    DTaP/Tdap/Td Vaccines (2 - Td or Tdap) 10/28/2029    HPV Vaccines (No Doses Required) Completed    Pneumococcal Vaccine  Completed    HIB Vaccines  Aged Out    Hepatitis B Vaccines  Aged Out    IPV Vaccines  Aged Out    Hepatitis A Vaccines  Aged Out    Meningococcal Vaccine  Aged Out    Rotavirus Vaccines  Aged Out    RSV High Risk: (Elderly (60+) or Pregnant Population)  Discontinued    Welcome to Medicare Visit  Discontinued    Mammogram  Discontinued    Diabetes: Urine Protein Screening  Discontinued    Zoster Vaccines  Discontinued    Hepatitis C Screening  Discontinued    Bone Density Scan  Discontinued    COVID-19 Vaccine  Discontinued    Irritable Bowel Syndrome  Discontinued         Wt Readings from Last 3 Encounters:   07/15/25 108 kg (238 lb)   04/16/25 109 kg (239 lb 10.2 oz)   03/26/25 106 kg (233 lb 4 oz)       Temp Readings from Last 3 Encounters:   07/15/25 36.3 °C (97.3 °F) (Temporal)   04/16/25 36 °C (96.8 °F)   03/26/25 36.5 °C (97.7 °F)     BP Readings from Last 3 Encounters:   07/15/25 138/76   04/16/25 167/81   03/26/25 147/76     Pulse Readings from Last 3 Encounters:   07/15/25 76   04/16/25 68   03/26/25 86     PHYSICAL EXAMINATION:      -----------------------------------------------------------------------------------------------------  GENERAL:  The patient appears  nourished     &  normal affect.      No acute distress.     Alert and oriented times 3.     No obvious skin rashes or lesions.    No gait disturbance.      HEENT:   Normocephalic, atraumatic.    Normal speech    Extraocular eye motions intact and pain free.                 Pupils reactive and equally round. Conjunctivae clear    TMs normal    No erythema pharynx      NECK:  No masses or adenopathy palpable.  No asymmetry visible.     No thyromegaly.    No bruits.       RESPIRATORY:  Equal breath sounds / no acute respiratory distress.      No wheezes,rales, or rhonchi        HEART:  Regular rhythm without murmur, rub or gallop.       ABDOMEN:  Soft, nontender.   No masses, guarding or rebound.     Normoactive bowel sounds. overwt      EXTREMITIES:   Trace -1+ pedal edema in any extremity.           No cyanosis or clubbing.      2+ dorsalis pedis pulses bilaterally        FOOT EXAM:    A comprehensive foot exam was performed including monofilament testing or equivalent for sensation.    Normal foot, ankle, and digit range of motion and strength.  Patient was found to have intact sensation, 2+ dorsalis pedis pulses and no concerning calluses, rashes, sores or foot lesions of any kind.   No obvious neuropathy.    1. Type 2 diabetes mellitus with hyperglycemia, with long-term current use of insulin        2. Essential hypertension        3. Mixed hyperlipidemia        4. 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 Lichen sclerosus        6. Elevated LFTs          Next mo repeat values  Not a drinker  Doesn't overuse tylenol  Ultrasound liver tomorrow    And again had a lengthy discussion w pt  about risks of poorly controlled diabetes including micro and macrovascular complications of DM2 including blindness,MI,CVA and death among other possibilities. Pt aware and agrees to better compliance and adherance to instructions such as regular eye exams q 1-2 y, foot exams,and f/u regularly for hba1c with a  "goal of 6.5.    NO evidence of neuropathy or nephropathy at this point    Follow up as planned for hba1c and BP checks REGULARLY.    Sooner if condition deteriorates or problems arise  Vanessa Son \"Jodie\" -be aware that any referrals discussed should be placed today or tests/labs ordered should result in prompt scheduling today.   If not done today-then a phone call for scheduling is expected in a timely manner(within 2 weeks).   If testing is to be done-a result should be available to patient within 2 weeks time unless otherwise specified.   You, the patient or caregiver, are responsible for making sure what was discussed is actually scheduled and completed.  If suboptimal understanding of results of tests or referral reason-a follow up appointment with me should be made.  If above does NOT occur-you are to connect with us for an explanation.  See me 3mo  Labs next mo  U/s tomorrow    Follow up at next scheduled visit -as planned or directed today.  Sooner if new or unresolved issues of concern.    Vanessa Son \"Jodie\" We know you have a choice for your health care, THANK YOU for choosing  and [facility].  We APPRECIATE YOU.  Sincerely,   Lalitha Ramirez MD   (dr. 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)    Essential hypertension    Gout    Hyperuricemia    Hematuria    Lichen sclerosus    Primary osteoarthritis of right knee    Status post total knee replacement    Class 3 severe obesity due to excess calories with serious comorbidity and body mass index (BMI) of 40.0 to 44.9 in adult    Type 2 diabetes mellitus with hyperglycemia, with long-term current use of insulin    Mixed hyperlipidemia    Colovaginal fistula    Granulation tissue    Gastroesophageal reflux disease    Portal hypertension (Multi)    Vulvar cancer (Multi)    H/O radical vulvectomy    " Postoperative pain   [2]   Past Surgical History:  Procedure Laterality Date    APPENDECTOMY  1963    CATARACT EXTRACTION Bilateral 2018    CHOLECYSTECTOMY  1974    COLON SURGERY  2024    colovaginal fistula repair    COLONOSCOPY  2024    adenoma-due     HYSTERECTOMY  1991    uterine CA limited no rad tx nor chemo    TONSILLECTOMY  1991    TOTAL KNEE ARTHROPLASTY Right 10/2021    TUBAL LIGATION  1977   [3]   Social History  Socioeconomic History    Marital status:    Tobacco Use    Smoking status: Former     Current packs/day: 0.00     Types: Cigarettes     Quit date:      Years since quittin.5     Passive exposure: Past    Smokeless tobacco: Never   Vaping Use    Vaping status: Never Used   Substance and Sexual Activity    Alcohol use: Not Currently    Drug use: Never    Sexual activity: Defer     Social Drivers of Health     Financial Resource Strain: Patient Declined (3/19/2025)    Overall Financial Resource Strain (CARDIA)     Difficulty of Paying Living Expenses: Patient declined   Food Insecurity: Patient Declined (3/19/2025)    Hunger Vital Sign     Worried About Running Out of Food in the Last Year: Patient declined     Ran Out of Food in the Last Year: Patient declined   Transportation Needs: Patient Declined (3/19/2025)    PRAPARE - Transportation     Lack of Transportation (Medical): Patient declined     Lack of Transportation (Non-Medical): Patient declined   Intimate Partner Violence: Not At Risk (3/19/2025)    Humiliation, Afraid, Rape, and Kick questionnaire     Fear of Current or Ex-Partner: No     Emotionally Abused: No     Physically Abused: No     Sexually Abused: No   Housing Stability: Unknown (3/19/2025)    Housing Stability Vital Sign     Unable to Pay for Housing in the Last Year: No     Number of Times Moved in the Last Year: 1     Homeless in the Last Year: Patient declined

## 2025-07-16 ENCOUNTER — HOSPITAL ENCOUNTER (OUTPATIENT)
Dept: RADIOLOGY | Facility: HOSPITAL | Age: 75
Discharge: HOME | End: 2025-07-16
Payer: MEDICARE

## 2025-07-16 DIAGNOSIS — C51.9 VULVAR CANCER (MULTI): ICD-10-CM

## 2025-07-16 DIAGNOSIS — R79.9 ABNORMAL BLOOD FINDINGS: ICD-10-CM

## 2025-07-16 DIAGNOSIS — R74.8 ELEVATED LIVER ENZYMES: ICD-10-CM

## 2025-07-16 PROCEDURE — 76882 US LMTD JT/FCL EVL NVASC XTR: CPT | Performed by: STUDENT IN AN ORGANIZED HEALTH CARE EDUCATION/TRAINING PROGRAM

## 2025-07-16 PROCEDURE — 76770 US EXAM ABDO BACK WALL COMP: CPT

## 2025-07-16 PROCEDURE — 76705 ECHO EXAM OF ABDOMEN: CPT

## 2025-07-17 LAB
ALBUMIN SERPL-MCNC: 3.8 G/DL (ref 3.6–5.1)
ALP SERPL-CCNC: 81 U/L (ref 37–153)
ALT SERPL-CCNC: 111 U/L (ref 6–29)
ANION GAP SERPL CALCULATED.4IONS-SCNC: 9 MMOL/L (CALC) (ref 7–17)
APTT PPP: 29 SEC (ref 23–32)
AST SERPL-CCNC: 85 U/L (ref 10–35)
BILIRUB SERPL-MCNC: 2.2 MG/DL (ref 0.2–1.2)
BUN SERPL-MCNC: 22 MG/DL (ref 7–25)
CALCIUM SERPL-MCNC: 9.7 MG/DL (ref 8.6–10.4)
CERULOPLASMIN SERPL-MCNC: 31 MG/DL (ref 14–48)
CHLORIDE SERPL-SCNC: 103 MMOL/L (ref 98–110)
CO2 SERPL-SCNC: 30 MMOL/L (ref 20–32)
CREAT SERPL-MCNC: 0.69 MG/DL (ref 0.6–1)
EGFRCR SERPLBLD CKD-EPI 2021: 91 ML/MIN/1.73M2
GLUCOSE SERPL-MCNC: 95 MG/DL (ref 65–99)
HAV IGM SERPL QL IA: NORMAL
HBV CORE IGM SERPL QL IA: NORMAL
HBV SURFACE AG SERPL QL IA: NORMAL
HCV AB SERPL QL IA: NORMAL
HFE GENE MUT ANL BLD/T: NORMAL
POTASSIUM SERPL-SCNC: 4.2 MMOL/L (ref 3.5–5.3)
PROT SERPL-MCNC: 6.6 G/DL (ref 6.1–8.1)
SODIUM SERPL-SCNC: 142 MMOL/L (ref 135–146)

## 2025-07-21 DIAGNOSIS — K74.69 OTHER CIRRHOSIS OF LIVER: ICD-10-CM

## 2025-07-23 ENCOUNTER — APPOINTMENT (OUTPATIENT)
Dept: GYNECOLOGIC ONCOLOGY | Facility: CLINIC | Age: 75
End: 2025-07-23
Payer: MEDICARE

## 2025-07-23 VITALS
DIASTOLIC BLOOD PRESSURE: 73 MMHG | SYSTOLIC BLOOD PRESSURE: 143 MMHG | TEMPERATURE: 97.2 F | OXYGEN SATURATION: 97 % | HEART RATE: 73 BPM | BODY MASS INDEX: 42.65 KG/M2 | WEIGHT: 240.74 LBS | RESPIRATION RATE: 17 BRPM

## 2025-07-23 DIAGNOSIS — Z08 ENCOUNTER FOR FOLLOW-UP SURVEILLANCE OF MALIGNANT NEOPLASM OF VULVA: ICD-10-CM

## 2025-07-23 DIAGNOSIS — C51.9 VULVAR CANCER (MULTI): Primary | ICD-10-CM

## 2025-07-23 DIAGNOSIS — Z85.44 ENCOUNTER FOR FOLLOW-UP SURVEILLANCE OF MALIGNANT NEOPLASM OF VULVA: ICD-10-CM

## 2025-07-23 DIAGNOSIS — L90.0 LICHEN SCLEROSUS: ICD-10-CM

## 2025-07-23 PROCEDURE — 1160F RVW MEDS BY RX/DR IN RCRD: CPT | Performed by: STUDENT IN AN ORGANIZED HEALTH CARE EDUCATION/TRAINING PROGRAM

## 2025-07-23 PROCEDURE — 99214 OFFICE O/P EST MOD 30 MIN: CPT | Performed by: STUDENT IN AN ORGANIZED HEALTH CARE EDUCATION/TRAINING PROGRAM

## 2025-07-23 PROCEDURE — 3077F SYST BP >= 140 MM HG: CPT | Performed by: STUDENT IN AN ORGANIZED HEALTH CARE EDUCATION/TRAINING PROGRAM

## 2025-07-23 PROCEDURE — 1126F AMNT PAIN NOTED NONE PRSNT: CPT | Performed by: STUDENT IN AN ORGANIZED HEALTH CARE EDUCATION/TRAINING PROGRAM

## 2025-07-23 PROCEDURE — 1159F MED LIST DOCD IN RCRD: CPT | Performed by: STUDENT IN AN ORGANIZED HEALTH CARE EDUCATION/TRAINING PROGRAM

## 2025-07-23 PROCEDURE — 3078F DIAST BP <80 MM HG: CPT | Performed by: STUDENT IN AN ORGANIZED HEALTH CARE EDUCATION/TRAINING PROGRAM

## 2025-07-23 RX ORDER — CLOBETASOL PROPIONATE 0.5 MG/G
CREAM TOPICAL 2 TIMES WEEKLY
Qty: 30 G | Refills: 3 | Status: SHIPPED | OUTPATIENT
Start: 2025-07-24

## 2025-07-23 RX ORDER — CLOBETASOL PROPIONATE 0.5 MG/G
CREAM TOPICAL 2 TIMES DAILY
Qty: 30 G | Refills: 3 | Status: SHIPPED | OUTPATIENT
Start: 2025-07-23 | End: 2025-07-23

## 2025-07-23 ASSESSMENT — PAIN SCALES - GENERAL: PAINLEVEL_OUTOF10: 0-NO PAIN

## 2025-07-23 NOTE — PROGRESS NOTES
Patient ID: Jodie Son is a 75 y.o. female.  Referring Physician: No referring provider defined for this encounter.  Primary Care Provider: Lalitha Ramirez MD    Subjective    Here for surveillance. Denies vulvar pruritus, pain, mass or bleeding. Seen by PCP. Undergoing work up for abnormal LFTs. No other concerns.    Objective    BSA: 2.2 meters squared  /73 (BP Location: Right arm, Patient Position: Sitting, BP Cuff Size: Adult)   Pulse 73   Temp 36.2 °C (97.2 °F) (Temporal)   Resp 17   Wt 109 kg (240 lb 11.9 oz)   LMP  (LMP Unknown)   SpO2 97%   BMI 42.65 kg/m²      Physical Exam  Vitals and nursing note reviewed. Exam conducted with a chaperone present.   Constitutional:       General: She is not in acute distress.     Appearance: Normal appearance.   HENT:      Head: Normocephalic and atraumatic.      Mouth/Throat:      Mouth: Mucous membranes are moist.      Pharynx: No oropharyngeal exudate or posterior oropharyngeal erythema.     Eyes:      Extraocular Movements: Extraocular movements intact.      Conjunctiva/sclera: Conjunctivae normal.      Pupils: Pupils are equal, round, and reactive to light.     Neck:      Thyroid: No thyroid mass or thyromegaly.     Cardiovascular:      Rate and Rhythm: Normal rate and regular rhythm.      Pulses: Normal pulses.      Heart sounds: Normal heart sounds.   Pulmonary:      Effort: Pulmonary effort is normal.      Breath sounds: Normal breath sounds. No wheezing, rhonchi or rales.   Abdominal:      General: Bowel sounds are normal. There is no distension.      Palpations: Abdomen is soft. There is no mass.      Tenderness: There is no abdominal tenderness.      Hernia: No hernia is present.   Genitourinary:     Comments: Vulva intact, no lesions or mass, findings consistent with LS on the R vulva with inferior aspect near introitus with superficial vessels  L vulva intact    Musculoskeletal:         General: No tenderness. Normal range of motion.       Cervical back: Normal range of motion and neck supple. No tenderness.      Right lower leg: No edema.      Left lower leg: No edema.     Skin:     General: Skin is warm.      Findings: No lesion or rash.     Neurological:      General: No focal deficit present.      Mental Status: She is alert and oriented to person, place, and time.     Psychiatric:         Mood and Affect: Mood normal.         Behavior: Behavior normal.       US lymph node 7/16/25  IMPRESSION:  1. Redemonstrated benign-appearing bilateral groin lymph nodes.        Performance Status:  Asymptomatic    Assessment/Plan     Oncology History Overview Note   10/30/24 R vulva  granulation tissue, L vulva LS  11/22/24 L vulvectomy invasive SCC, sarcomatoid pattern, dVIN, LS positive margins at 12 and 6 o'clock and within 1 mm of of the deep margin with at least 5 mm DOI consistent with stage IB, R  vulvectomy foci suspicious for superficially invasive SCC approximately 1 mm, HP independent, dVIN and LS  Tumor board reexcision L vulva, bilateral inguinofemoral SLND  3/18/25 modified radical vulvectomy L and simple vulvectomy R bilateral sentinel inguinal femoral lymph node dissection  Pathology no residual invasive carcinoma, LS present, LN without petey tissue identified, no tumor consistent with stage IB       Vulvar cancer (Multi)   1/31/2025 Initial Diagnosis    Vulvar cancer (Multi)          Diagnoses and all orders for this visit:  Vulvar cancer (Multi)  Lichen sclerosus  # Vulvar cancer  - Discussed pathogenesis of vulvar cancer, we discussed hers is due to vulvar dermatoses   - Pathology reviewed, no residual carcinoma involving the vulva and unfortunately no lymph node tissue observed on review of bilateral SLN  - Groin US reviewed, no suspicious LN noted  - Exam without clinical s/sx of recurrence    # LS   - clobetasol 2-3 times weekly at night    RTC 3 months    Adina Navarro MD MPH

## 2025-07-24 NOTE — PATIENT INSTRUCTIONS
If you have any questions or need assistance, please don't hesitate to contact us.        Our OFFICE is located at [facility], [place].                    Please CALL the numbers below:      Haley Weaver NP:          Office [phone]         Fax: [phone]  Hepatology Nurse Coordinator:         Lalitha HALL [phone]          SCHEDULING   Main Scheduling Number: [phone] (See below for department name when scheduling)  You will get a notification through [employer] or a phone call/text to help you schedule all your tests. If you prefer, feel free to call the main scheduling number to set up any tests you need.          To ensure you receive the best care during your appointments, it is important to have tests done before your visit. This helps us assess your liver health accurately and tailor your treatment plan accordingly.    We recommend that you have your lab tests and liver ultrasound done every six months. Ideally, try to schedule these tests around the same time to help us monitor your liver health and screen for liver cancer effectively.     Here is a summary of the tests we have ordered and when they are due:

## 2025-07-25 ENCOUNTER — OFFICE VISIT (OUTPATIENT)
Dept: GASTROENTEROLOGY | Facility: CLINIC | Age: 75
End: 2025-07-25
Payer: MEDICARE

## 2025-07-25 VITALS
HEIGHT: 63 IN | HEART RATE: 78 BPM | WEIGHT: 242.8 LBS | DIASTOLIC BLOOD PRESSURE: 74 MMHG | BODY MASS INDEX: 43.02 KG/M2 | TEMPERATURE: 97.9 F | SYSTOLIC BLOOD PRESSURE: 148 MMHG

## 2025-07-25 DIAGNOSIS — R74.8 ELEVATED LIVER ENZYMES: Primary | ICD-10-CM

## 2025-07-25 DIAGNOSIS — K74.60 CIRRHOSIS OF LIVER WITHOUT ASCITES, UNSPECIFIED HEPATIC CIRRHOSIS TYPE (MULTI): ICD-10-CM

## 2025-07-25 PROCEDURE — 3077F SYST BP >= 140 MM HG: CPT | Performed by: NURSE PRACTITIONER

## 2025-07-25 PROCEDURE — 3078F DIAST BP <80 MM HG: CPT | Performed by: NURSE PRACTITIONER

## 2025-07-25 PROCEDURE — 1160F RVW MEDS BY RX/DR IN RCRD: CPT | Performed by: NURSE PRACTITIONER

## 2025-07-25 PROCEDURE — 99204 OFFICE O/P NEW MOD 45 MIN: CPT | Performed by: NURSE PRACTITIONER

## 2025-07-25 PROCEDURE — G2211 COMPLEX E/M VISIT ADD ON: HCPCS | Performed by: NURSE PRACTITIONER

## 2025-07-25 PROCEDURE — 1159F MED LIST DOCD IN RCRD: CPT | Performed by: NURSE PRACTITIONER

## 2025-07-25 PROCEDURE — 3008F BODY MASS INDEX DOCD: CPT | Performed by: NURSE PRACTITIONER

## 2025-07-25 PROCEDURE — 99212 OFFICE O/P EST SF 10 MIN: CPT | Performed by: NURSE PRACTITIONER

## 2025-07-25 PROCEDURE — 1126F AMNT PAIN NOTED NONE PRSNT: CPT | Performed by: NURSE PRACTITIONER

## 2025-07-25 ASSESSMENT — ENCOUNTER SYMPTOMS
JOINT SWELLING: 0
ABDOMINAL DISTENTION: 0
FEVER: 0
CONSTIPATION: 0
BRUISES/BLEEDS EASILY: 0
SHORTNESS OF BREATH: 0
COUGH: 0
BLOOD IN STOOL: 0
DIFFICULTY URINATING: 0
WEAKNESS: 0
ABDOMINAL PAIN: 0
FREQUENCY: 0
TREMORS: 0
UNEXPECTED WEIGHT CHANGE: 0
CONFUSION: 0
DYSURIA: 0
DIARRHEA: 0
COLOR CHANGE: 0
WHEEZING: 0
TROUBLE SWALLOWING: 0
CHILLS: 0
AGITATION: 0
NAUSEA: 0
VOICE CHANGE: 0
VOMITING: 0
LIGHT-HEADEDNESS: 0
SLEEP DISTURBANCE: 0
PALPITATIONS: 0
APPETITE CHANGE: 0
DIZZINESS: 0
HEMATURIA: 0
HEADACHES: 0
NUMBNESS: 0

## 2025-07-25 ASSESSMENT — PAIN SCALES - GENERAL: PAINLEVEL_OUTOF10: 0-NO PAIN

## 2025-07-25 NOTE — PROGRESS NOTES
"Patient ID: Vanessa Son \"Valerie" is a 75 y.o. female who presents in consultation for cirrhosis.    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     Developed elevated liver enzymes 07/08/2025:  , , Bili 2.4  Repeat 07/16/2025:  AST 85, , Bili 2.2  CBC normal.  Labs historically have been normal.    Pt states she had started 2 herbal supplements recently called Snapeee and organic trikatu.  She stopped these supplements and 1 week later her labs started to improve  No other new medications, travel.     She is asymptomatic.  No ETOH.  Viral hepatitis studies are negative.    US of abdomen 07/2025:  cirrhotic hepatic morphology without gross lesions.  CT scan last year showed irregular lobulation of the hepatic surface contour suggesting cirrhosis.      Denies jaundice, icterus, itching, abdominal distension, edema, bleeding or confusion.    Denies fevers, chills, abdominal pain.         Review of Systems   Constitutional:  Negative for appetite change, chills, fever and unexpected weight change.   HENT:  Negative for mouth sores, nosebleeds, trouble swallowing and voice change.    Eyes:  Negative for visual disturbance.   Respiratory:  Negative for cough, shortness of breath and wheezing.    Cardiovascular:  Negative for chest pain, palpitations and leg swelling.   Gastrointestinal:  Negative for abdominal distention, abdominal pain, blood in stool, constipation, diarrhea, nausea and vomiting.   Genitourinary:  Negative for decreased urine volume, difficulty urinating, dysuria, frequency, hematuria and urgency.   Musculoskeletal:  Negative for gait problem and joint swelling.   Skin:  Negative for color change, pallor and rash.   Neurological:  Negative for dizziness, tremors, weakness, light-headedness, numbness and " headaches.   Hematological:  Does not bruise/bleed easily.   Psychiatric/Behavioral:  Negative for agitation, behavioral problems, confusion and sleep disturbance.        Objective   Physical Exam  Constitutional:       General: She is awake.      Appearance: Normal appearance. She is well-developed.   HENT:      Head: Normocephalic and atraumatic.      Right Ear: Hearing normal.      Left Ear: Hearing normal.      Nose: Nose normal.      Mouth/Throat:      Lips: Pink.      Mouth: Mucous membranes are moist.     Eyes:      General: Lids are normal.      Extraocular Movements: Extraocular movements intact.      Conjunctiva/sclera: Conjunctivae normal.      Pupils: Pupils are equal, round, and reactive to light.       Cardiovascular:      Rate and Rhythm: Normal rate and regular rhythm.      Pulses: Normal pulses.      Heart sounds: Normal heart sounds.   Pulmonary:      Effort: Pulmonary effort is normal.      Breath sounds: Normal breath sounds.   Abdominal:      General: Abdomen is flat. Bowel sounds are normal.      Palpations: Abdomen is soft.     Musculoskeletal:      Cervical back: Normal range of motion and neck supple.   Feet:      Right foot:      Skin integrity: Skin integrity normal.      Left foot:      Skin integrity: Skin integrity normal.     Skin:     General: Skin is warm.     Neurological:      General: No focal deficit present.      Mental Status: She is alert and oriented to person, place, and time.      Cranial Nerves: Cranial nerves 2-12 are intact.      Sensory: Sensation is intact.      Motor: Motor function is intact.      Coordination: Coordination is intact.      Gait: Gait is intact.     Psychiatric:         Attention and Perception: Attention and perception normal.         Mood and Affect: Mood normal.         Speech: Speech normal.         Behavior: Behavior is cooperative.         Thought Content: Thought content normal.         Cognition and Memory: Cognition normal.         Judgment:  Judgment normal.         Current Medications[1]  LABS:   Lab Results   Component Value Date    ALBUMIN 3.8 07/16/2025    BILITOT 2.2 (H) 07/16/2025    ALKPHOS 81 07/16/2025     (H) 07/16/2025    AST 85 (H) 07/16/2025    PROT 6.6 07/16/2025    CERULOPLSM 31 07/16/2025    HEPBSAG NON-REACTIVE 07/16/2025    HEPCAB NON-REACTIVE 07/16/2025    INR 1.1 09/20/2021      Lab Results   Component Value Date    ALBUMIN 3.8 07/16/2025    BILITOT 2.2 (H) 07/16/2025    ALKPHOS 81 07/16/2025     (H) 07/16/2025    AST 85 (H) 07/16/2025    PROT 6.6 07/16/2025      Lab Results   Component Value Date    WBC 4.8 07/08/2025    HGB 12.6 07/08/2025    HCT 37.9 07/08/2025    MCV 92.0 07/08/2025     07/08/2025       === 07/16/25 ===    US ABDOMEN LIMITED LIVER    - Impression -  1. Cirrhotic hepatic morphology without gross lesions.    MACRO:  None    Signed by: Darek Pedraza 7/18/2025 5:53 AM  Dictation workstation:   [id number]     Assessment/Plan   Problem List Items Addressed This Visit           ICD-10-CM    Elevated liver enzymes - Primary R74.8    75 year old with elevated liver enzymes in the setting of autoimmune disease, metabolic syndrome and recent herbal supplementation.    Prior labs were completely normal.  Since stopping herbal supplements, labs improved and there could be a correlation to these.  However, additional etiologies should be ruled out given history of autoimmune disease.  Will repeat labs and do additional labs to rule out other causes of liver disease.           Relevant Orders    Alpha-1 Antitrypsin Phenotype    Alpha-Fetoprotein    CRISTA with Reflex to JOE    Anti-Mitochondrial Antibody    Anti-Smooth Muscle Antibody    Iron and TIBC    Ferritin    Protime-INR    Enhanced Liver Fibrosis (ELF) Score    Liver Elastography (Fibroscan)    CBC and Auto Differential    Comprehensive Metabolic Panel    Cirrhosis of liver without ascites (Multi) K74.60    Imaging suggestive of cirrhosis with  nodularity to the liver on imaging.  No clinical evidence of cirrhosis-or symptoms.  Will do fibroscan and check ELF score to correlate.  If cirrhosis is confirmed, will need regular 6 month monitoring with labs and imaging.          Relevant Orders    Alpha-1 Antitrypsin Phenotype    Alpha-Fetoprotein    CRISTA with Reflex to JOE    Anti-Mitochondrial Antibody    Anti-Smooth Muscle Antibody    Iron and TIBC    Ferritin    Protime-INR    Enhanced Liver Fibrosis (ELF) Score    Liver Elastography (Fibroscan)    CBC and Auto Differential    Comprehensive Metabolic Panel            ANDRZEJ Alvarez-CNP 07/25/25 10:02 AM              [1]   Current Outpatient Medications   Medication Sig Dispense Refill    amLODIPine (Norvasc) 5 mg tablet TAKE 1 TABLET BY MOUTH ONCE  DAILY 90 tablet 3    aspirin 81 mg EC tablet Take 1 tablet (81 mg) by mouth once daily. (Patient taking differently: Take 1 tablet (81 mg) by mouth once daily. On hold)      cholecalciferol (Vitamin D-3) 5,000 Units tablet Take 1 tablet (5,000 Units) by mouth once daily. (Patient taking differently: Take 1 tablet (5,000 Units) by mouth once daily. On hold)      clobetasol (Temovate) 0.05 % cream Apply topically 2 times a week. 30 g 3    colchicine 0.6 mg tablet TAKE 1 TABLET BY MOUTH DAILY AS  NEEDED FOR GOUT 90 tablet 3    HumaLOG KwikPen Insulin 100 unit/mL injection INJECT 12 UNITS SUBCUTANEOUSLY 3 TIMES DAILY WITH MEALS 45 mL 3    indapamide (Lozol) 1.25 mg tablet TAKE 1 TABLET BY MOUTH ONCE  DAILY IN THE EVENING 90 tablet 3    lancets 33 gauge misc Test 3 times daily 300 each 3    Lantus Solostar U-100 Insulin 100 unit/mL (3 mL) pen INJECT SUBCUTANEOUSLY 100 UNITS  DAILY 90 mL 3    losartan-hydrochlorothiazide (Hyzaar) 50-12.5 mg tablet Take 0.5 tablets by mouth once daily in the evening. 45 tablet 3    metoprolol tartrate (Lopressor) 100 mg tablet TAKE 1 TABLET BY MOUTH TWICE  DAILY 180 tablet 3    OneTouch Ultra Test strip TEST 3 TIMES DAILY 300  "strip 3    pen needle, diabetic 31 gauge x 3/16\" needle Use 5 daily to inject insulin 500 each 3    Victoza 3-Abiel 0.6 mg/0.1 mL (18 mg/3 mL) injection INJECT SUBCUTANEOUSLY 1.8 MG  DAILY 27 mL 3     No current facility-administered medications for this visit.     "

## 2025-07-25 NOTE — ASSESSMENT & PLAN NOTE
Imaging suggestive of cirrhosis with nodularity to the liver on imaging.  No clinical evidence of cirrhosis-or symptoms.  Will do fibroscan and check ELF score to correlate.  If cirrhosis is confirmed, will need regular 6 month monitoring with labs and imaging.

## 2025-07-25 NOTE — ASSESSMENT & PLAN NOTE
75 year old with elevated liver enzymes in the setting of autoimmune disease, metabolic syndrome and recent herbal supplementation.    Prior labs were completely normal.  Since stopping herbal supplements, labs improved and there could be a correlation to these.  However, additional etiologies should be ruled out given history of autoimmune disease.  Will repeat labs and do additional labs to rule out other causes of liver disease.

## 2025-07-26 LAB
A1AT PHENOTYP SERPL-IMP: NORMAL
AFP-TM SERPL-MCNC: NORMAL NG/ML
ALBUMIN SERPL-MCNC: 3.6 G/DL (ref 3.6–5.1)
ALP SERPL-CCNC: 61 U/L (ref 37–153)
ALT SERPL-CCNC: 28 U/L (ref 6–29)
ANA SER QL IF: NORMAL
ANION GAP SERPL CALCULATED.4IONS-SCNC: 5 MMOL/L (CALC) (ref 7–17)
AST SERPL-CCNC: 25 U/L (ref 10–35)
BASOPHILS # BLD AUTO: 20 CELLS/UL (ref 0–200)
BASOPHILS NFR BLD AUTO: 0.4 %
BILIRUB SERPL-MCNC: 1.4 MG/DL (ref 0.2–1.2)
BUN SERPL-MCNC: 25 MG/DL (ref 7–25)
CALCIUM SERPL-MCNC: 9.6 MG/DL (ref 8.6–10.4)
CHLORIDE SERPL-SCNC: 103 MMOL/L (ref 98–110)
CO2 SERPL-SCNC: 31 MMOL/L (ref 20–32)
CREAT SERPL-MCNC: 0.74 MG/DL (ref 0.6–1)
EGFRCR SERPLBLD CKD-EPI 2021: 85 ML/MIN/1.73M2
ENHANCED LIVER FIBROSIS (ELF) SCORE: NORMAL
EOSINOPHIL # BLD AUTO: 200 CELLS/UL (ref 15–500)
EOSINOPHIL NFR BLD AUTO: 4 %
ERYTHROCYTE [DISTWIDTH] IN BLOOD BY AUTOMATED COUNT: 15 % (ref 11–15)
FERRITIN SERPL-MCNC: 163 NG/ML (ref 16–288)
GLUCOSE SERPL-MCNC: 156 MG/DL (ref 65–139)
HCT VFR BLD AUTO: 38.4 % (ref 35–45)
HGB BLD-MCNC: 12.8 G/DL (ref 11.7–15.5)
INR PPP: 1.1
IRON SATN MFR SERPL: 31 % (CALC) (ref 16–45)
IRON SERPL-MCNC: 108 MCG/DL (ref 45–160)
LYMPHOCYTES # BLD AUTO: 1405 CELLS/UL (ref 850–3900)
LYMPHOCYTES NFR BLD AUTO: 28.1 %
MCH RBC QN AUTO: 31.4 PG (ref 27–33)
MCHC RBC AUTO-ENTMCNC: 33.3 G/DL (ref 32–36)
MCV RBC AUTO: 94.1 FL (ref 80–100)
MITOCHONDRIA AB SER QL IF: NORMAL
MONOCYTES # BLD AUTO: 370 CELLS/UL (ref 200–950)
MONOCYTES NFR BLD AUTO: 7.4 %
NEUTROPHILS # BLD AUTO: 3005 CELLS/UL (ref 1500–7800)
NEUTROPHILS NFR BLD AUTO: 60.1 %
PLATELET # BLD AUTO: 146 THOUSAND/UL (ref 140–400)
PMV BLD REES-ECKER: 10.4 FL (ref 7.5–12.5)
POTASSIUM SERPL-SCNC: 4.1 MMOL/L (ref 3.5–5.3)
PROT SERPL-MCNC: 6.6 G/DL (ref 6.1–8.1)
PROTHROMBIN TIME: 11.7 SEC (ref 9–11.5)
RBC # BLD AUTO: 4.08 MILLION/UL (ref 3.8–5.1)
SMOOTH MUSCLE AB SER QL IF: NORMAL
SODIUM SERPL-SCNC: 139 MMOL/L (ref 135–146)
TIBC SERPL-MCNC: 343 MCG/DL (CALC) (ref 250–450)
WBC # BLD AUTO: 5 THOUSAND/UL (ref 3.8–10.8)

## 2025-07-30 DIAGNOSIS — Z79.4 TYPE 2 DIABETES MELLITUS WITH HYPERGLYCEMIA, WITH LONG-TERM CURRENT USE OF INSULIN: ICD-10-CM

## 2025-07-30 DIAGNOSIS — R76.8 POSITIVE ANA (ANTINUCLEAR ANTIBODY): ICD-10-CM

## 2025-07-30 DIAGNOSIS — I10 ESSENTIAL HYPERTENSION: ICD-10-CM

## 2025-07-30 DIAGNOSIS — E11.65 TYPE 2 DIABETES MELLITUS WITH HYPERGLYCEMIA, WITH LONG-TERM CURRENT USE OF INSULIN: ICD-10-CM

## 2025-07-30 DIAGNOSIS — N18.1 CKD STAGE 1 DUE TO TYPE 2 DIABETES MELLITUS (MULTI): ICD-10-CM

## 2025-07-30 DIAGNOSIS — E11.22 CKD STAGE 1 DUE TO TYPE 2 DIABETES MELLITUS (MULTI): ICD-10-CM

## 2025-08-01 LAB
A1AT PHENOTYP SERPL-IMP: NORMAL
AFP-TM SERPL-MCNC: 4.5 NG/ML
ALBUMIN SERPL-MCNC: 3.6 G/DL (ref 3.6–5.1)
ALP SERPL-CCNC: 61 U/L (ref 37–153)
ALT SERPL-CCNC: 28 U/L (ref 6–29)
ANA PAT SER IF-IMP: ABNORMAL
ANA SER QL IF: POSITIVE
ANA TITR SER IF: ABNORMAL TITER
ANION GAP SERPL CALCULATED.4IONS-SCNC: 5 MMOL/L (CALC) (ref 7–17)
AST SERPL-CCNC: 25 U/L (ref 10–35)
BASOPHILS # BLD AUTO: 20 CELLS/UL (ref 0–200)
BASOPHILS NFR BLD AUTO: 0.4 %
BILIRUB SERPL-MCNC: 1.4 MG/DL (ref 0.2–1.2)
BUN SERPL-MCNC: 25 MG/DL (ref 7–25)
CALCIUM SERPL-MCNC: 9.6 MG/DL (ref 8.6–10.4)
CENTROMERE B AB SER-ACNC: ABNORMAL AI
CHLORIDE SERPL-SCNC: 103 MMOL/L (ref 98–110)
CO2 SERPL-SCNC: 31 MMOL/L (ref 20–32)
CREAT SERPL-MCNC: 0.74 MG/DL (ref 0.6–1)
DSDNA AB SER-ACNC: 7 IU/ML
EGFRCR SERPLBLD CKD-EPI 2021: 85 ML/MIN/1.73M2
ENA JO1 AB SER IA-ACNC: ABNORMAL AI
ENA RNP AB SER-ACNC: ABNORMAL AI
ENA SCL70 AB SER IA-ACNC: ABNORMAL AI
ENA SM AB SER IA-ACNC: ABNORMAL AI
ENA SM+RNP AB SER IA-ACNC: ABNORMAL AI
ENA SS-A AB SER IA-ACNC: ABNORMAL AI
ENA SS-B AB SER IA-ACNC: ABNORMAL AI
ENHANCED LIVER FIBROSIS (ELF) SCORE: 12.16
EOSINOPHIL # BLD AUTO: 200 CELLS/UL (ref 15–500)
EOSINOPHIL NFR BLD AUTO: 4 %
ERYTHROCYTE [DISTWIDTH] IN BLOOD BY AUTOMATED COUNT: 15 % (ref 11–15)
FERRITIN SERPL-MCNC: 163 NG/ML (ref 16–288)
GLUCOSE SERPL-MCNC: 156 MG/DL (ref 65–139)
HCT VFR BLD AUTO: 38.4 % (ref 35–45)
HGB BLD-MCNC: 12.8 G/DL (ref 11.7–15.5)
INR PPP: 1.1
IRON SATN MFR SERPL: 31 % (CALC) (ref 16–45)
IRON SERPL-MCNC: 108 MCG/DL (ref 45–160)
LABORATORY COMMENT REPORT: ABNORMAL
LYMPHOCYTES # BLD AUTO: 1405 CELLS/UL (ref 850–3900)
LYMPHOCYTES NFR BLD AUTO: 28.1 %
MCH RBC QN AUTO: 31.4 PG (ref 27–33)
MCHC RBC AUTO-ENTMCNC: 33.3 G/DL (ref 32–36)
MCV RBC AUTO: 94.1 FL (ref 80–100)
MITOCHONDRIA AB SER QL IF: NEGATIVE
MONOCYTES # BLD AUTO: 370 CELLS/UL (ref 200–950)
MONOCYTES NFR BLD AUTO: 7.4 %
NEUTROPHILS # BLD AUTO: 3005 CELLS/UL (ref 1500–7800)
NEUTROPHILS NFR BLD AUTO: 60.1 %
NUCLEOSOME AB SER IA-ACNC: ABNORMAL AI
PLATELET # BLD AUTO: 146 THOUSAND/UL (ref 140–400)
PMV BLD REES-ECKER: 10.4 FL (ref 7.5–12.5)
POTASSIUM SERPL-SCNC: 4.1 MMOL/L (ref 3.5–5.3)
PROT SERPL-MCNC: 6.6 G/DL (ref 6.1–8.1)
PROTHROMBIN TIME: 11.7 SEC (ref 9–11.5)
RBC # BLD AUTO: 4.08 MILLION/UL (ref 3.8–5.1)
RIBOSOMAL P AB SER-ACNC: ABNORMAL AI
SMOOTH MUSCLE AB SER QL IF: NEGATIVE
SODIUM SERPL-SCNC: 139 MMOL/L (ref 135–146)
TIBC SERPL-MCNC: 343 MCG/DL (CALC) (ref 250–450)
WBC # BLD AUTO: 5 THOUSAND/UL (ref 3.8–10.8)

## 2025-08-04 ENCOUNTER — CLINICAL SUPPORT (OUTPATIENT)
Dept: GASTROENTEROLOGY | Facility: CLINIC | Age: 75
End: 2025-08-04
Payer: MEDICARE

## 2025-08-04 DIAGNOSIS — K74.60 CIRRHOSIS OF LIVER WITHOUT ASCITES, UNSPECIFIED HEPATIC CIRRHOSIS TYPE (MULTI): ICD-10-CM

## 2025-08-04 DIAGNOSIS — R74.8 ELEVATED LIVER ENZYMES: ICD-10-CM

## 2025-08-04 PROCEDURE — 91200 LIVER ELASTOGRAPHY: CPT | Performed by: INTERNAL MEDICINE

## 2025-08-14 ENCOUNTER — OFFICE VISIT (OUTPATIENT)
Dept: GASTROENTEROLOGY | Facility: CLINIC | Age: 75
End: 2025-08-14
Payer: MEDICARE

## 2025-08-14 VITALS
DIASTOLIC BLOOD PRESSURE: 81 MMHG | HEART RATE: 73 BPM | WEIGHT: 243 LBS | TEMPERATURE: 97.8 F | BODY MASS INDEX: 43.05 KG/M2 | SYSTOLIC BLOOD PRESSURE: 152 MMHG

## 2025-08-14 DIAGNOSIS — R74.8 ELEVATED LIVER ENZYMES: ICD-10-CM

## 2025-08-14 DIAGNOSIS — K74.60 CIRRHOSIS OF LIVER WITHOUT ASCITES, UNSPECIFIED HEPATIC CIRRHOSIS TYPE (MULTI): Primary | ICD-10-CM

## 2025-08-14 PROCEDURE — 1126F AMNT PAIN NOTED NONE PRSNT: CPT | Performed by: NURSE PRACTITIONER

## 2025-08-14 PROCEDURE — 3079F DIAST BP 80-89 MM HG: CPT | Performed by: NURSE PRACTITIONER

## 2025-08-14 PROCEDURE — G2211 COMPLEX E/M VISIT ADD ON: HCPCS | Performed by: NURSE PRACTITIONER

## 2025-08-14 PROCEDURE — 99213 OFFICE O/P EST LOW 20 MIN: CPT | Performed by: NURSE PRACTITIONER

## 2025-08-14 PROCEDURE — 1159F MED LIST DOCD IN RCRD: CPT | Performed by: NURSE PRACTITIONER

## 2025-08-14 PROCEDURE — 99214 OFFICE O/P EST MOD 30 MIN: CPT | Performed by: NURSE PRACTITIONER

## 2025-08-14 PROCEDURE — 3077F SYST BP >= 140 MM HG: CPT | Performed by: NURSE PRACTITIONER

## 2025-08-14 PROCEDURE — 1160F RVW MEDS BY RX/DR IN RCRD: CPT | Performed by: NURSE PRACTITIONER

## 2025-08-14 ASSESSMENT — ENCOUNTER SYMPTOMS
AGITATION: 0
DIARRHEA: 0
APPETITE CHANGE: 0
TREMORS: 0
UNEXPECTED WEIGHT CHANGE: 0
JOINT SWELLING: 0
SHORTNESS OF BREATH: 0
NUMBNESS: 0
WHEEZING: 0
VOICE CHANGE: 0
COLOR CHANGE: 0
PALPITATIONS: 0
SLEEP DISTURBANCE: 0
CHILLS: 0
TROUBLE SWALLOWING: 0
HEMATURIA: 0
VOMITING: 0
DIZZINESS: 0
FEVER: 0
CONFUSION: 0
HEADACHES: 0
BRUISES/BLEEDS EASILY: 0
BLOOD IN STOOL: 0
ABDOMINAL PAIN: 0
CONSTIPATION: 0
NAUSEA: 0
COUGH: 0
DIFFICULTY URINATING: 0
ABDOMINAL DISTENTION: 0
FREQUENCY: 0
LIGHT-HEADEDNESS: 0
DYSURIA: 0
WEAKNESS: 0

## 2025-08-14 ASSESSMENT — PAIN SCALES - GENERAL: PAINLEVEL_OUTOF10: 0-NO PAIN

## 2025-10-20 ENCOUNTER — APPOINTMENT (OUTPATIENT)
Dept: PRIMARY CARE | Facility: CLINIC | Age: 75
End: 2025-10-20
Payer: MEDICARE

## (undated) DEVICE — SOLUTION, IRRIGATION, STERILE WATER, 1000 ML, POUR BOTTLE

## (undated) DEVICE — APPLICATOR, CHLORAPREP, W/ORANGE TINT, 26ML

## (undated) DEVICE — SUTURE, VICRYL, 2-0, 18 IN, UNDYED

## (undated) DEVICE — GOWN, ASTOUND, L

## (undated) DEVICE — SOLUTION, IRRIGATION, SODIUM CHLORIDE 0.9%, 1000 ML, POUR BOTTLE

## (undated) DEVICE — DRAIN, JP CHANNEL, 15 FR, RND, W/TROCAR

## (undated) DEVICE — PAD, GROUNDING, ELECTROSURGICAL, W/9 FT CABLE, POLYHESIVE II, ADULT, LF

## (undated) DEVICE — COVER, CART, 45 X 27 X 48 IN, CLEAR

## (undated) DEVICE — NEEDLE, SPINAL, 20 G X 3.5 IN, YELLOW HUB

## (undated) DEVICE — TOWEL, OR, XRAY DETECT 5 PK, WHITE, 17X26, W/DMT TAG, ST

## (undated) DEVICE — Device

## (undated) DEVICE — SUTURE, PDSII, 1, TP-1, VIL, MONO, 48LP

## (undated) DEVICE — DRESSING, ISLAND, TELFA, 4 X 5 IN

## (undated) DEVICE — DRESSING, NON-ADHERENT, TELFA, OUCHLESS, 3 X 8 IN, STERILE

## (undated) DEVICE — BAG, DRAIN METER, URINE, 350CC, LF

## (undated) DEVICE — CAUTERY, PENCIL, PUSH BUTTON, SMOKE EVAC, 70MM

## (undated) DEVICE — SYRINGE, 60 CC, IRRIGATION, BULB, CONTRO-BULB, PAPER POUCH

## (undated) DEVICE — SUTURE, ETHIBOND EXCEL 1, TAPER POINT CT-1 GREEN 30 INCH

## (undated) DEVICE — GUIDEWIRE, DUAL SENSOR, .035 X 150 STRAIGHT,  3CM

## (undated) DEVICE — STAPLER, LINEAR, 3.5 60MM, RELOADABLE, BLUE

## (undated) DEVICE — COVER, LIGHT HANDLE, SURGICAL, FLEXIBLE, DISPOSABLE, STERILE

## (undated) DEVICE — COVER, TABLE, 44 X 75 IN, DISPOSABLE, LF, STERILE

## (undated) DEVICE — SYRINGE, HYPODERMIC, LUER LOCK, 6 CC

## (undated) DEVICE — DRAPE, PAD, PREP, W/ 9 IN CUFF, 24 X 41, LF, NS

## (undated) DEVICE — CUTTER, PROX LINEAR, 75MM, REG TISSUE, W/ SAFETY LOCK OUT

## (undated) DEVICE — SUTURE, MONOCRYL, 3-0, 27 IN, SH, UNDYED

## (undated) DEVICE — MANIFOLD, 4 PORT NEPTUNE STANDARD

## (undated) DEVICE — OSTOMY KIT, POSTOP, COLOSTOMY, 2 PIECE, 2 3/4 INCH

## (undated) DEVICE — COVER, PLASTIC, MAYO STAND, 29.5IN X 55.5IN

## (undated) DEVICE — TIP, SUCTION, YANKAUER, W/O VENT, FLEXIBLE, OPEN TIP, HIGH CAPACITY

## (undated) DEVICE — CATHETER, URETHRAL, ROBNEL, 16 FR, 16 IN, LF, RED

## (undated) DEVICE — TOWEL PACK, STERILE, 4/PACK, BLUE

## (undated) DEVICE — DRAPE, LEGGINGS, 48 X 31 IN, STERILE, LF

## (undated) DEVICE — SUTURE, VICRYL, 0, SH 27 TAPER NEEDLE, UNDYED, BRAIDED

## (undated) DEVICE — HOLSTER, JET SAFETY

## (undated) DEVICE — TRAY, SURESTEP, SILICONE DRAINAGE BAG, STATLOCK, 16FR

## (undated) DEVICE — STAPLER, LINEAR, RELOADABLE, 60MM 4.8, GREEN

## (undated) DEVICE — SPONGE GAUZE, XRAY SC+RFID, 4X4 16 PLY, STERILE

## (undated) DEVICE — COUNTER, NEEDLE, FOAM BLOCK, POP-N-COUNT, W/BLADEGUARD, W/ADHESIVE 40 COUNT, RED

## (undated) DEVICE — SUTURE, VICRYL, 2-0, 27 IN, BR/SH 27, VIOLET

## (undated) DEVICE — COVER, MAYO STAND, W/PAD, 23 IN, DISPOSABLE, PLASTIC, LF, STERILE

## (undated) DEVICE — MARKER, SKIN, RULER AND LABEL PACK, CUSTOM

## (undated) DEVICE — SUTURE, SILK, 2-0, 18 IN, BR PS, BLACK

## (undated) DEVICE — VESSEL LOOP, RED MAXI, 2 CARD

## (undated) DEVICE — LIGASURE IMPACT, 18CM

## (undated) DEVICE — ELECTRODE, ELECTROSURGICAL, BLADE, EXTENDED

## (undated) DEVICE — GLOVE, SURGICAL, BIOGEL, 7.5, PF, LATEX, GREEN

## (undated) DEVICE — DRAPE PACK, UNIVERSAL II

## (undated) DEVICE — DRESSING, ADHESIVE, ISLAND, TELFA, 4 X 14 IN

## (undated) DEVICE — CATHETER, IV, INSYTE, AUTOGUARD, SHIELDED, 20 G X 1.16 IN, VIALON

## (undated) DEVICE — CLEANER, ELECTROSURGICAL, TIP, 5 X 5 CM, LF

## (undated) DEVICE — DRAPE, FLUID WARMER

## (undated) DEVICE — COVER, TRANSDUCER, NEO GUARD, 2.6 X 30CM, LF

## (undated) DEVICE — ELECTRODE, ELECTROSURGICAL, NEEDLE, 1.1 IN, LF

## (undated) DEVICE — STAPLER,  ECHELON CIRCULAR POWERED, 29MM, DISP

## (undated) DEVICE — BIOPSY TRAY, BREAST, NEEDLE CORE PROCEDURE

## (undated) DEVICE — SUTURE, VICRYL, 3-0, 27 IN, SH

## (undated) DEVICE — DRAPE, SHEET, THREE QUARTER, FAN FOLD, 57 X 77 IN

## (undated) DEVICE — SUTURE, VICRYL, 0, 18 IN,TIE, UNDYED

## (undated) DEVICE — TUBING, SUCTION, CONNECTING, STERILE 0.25 X 120 IN., LF

## (undated) DEVICE — LIGASURE, CURVED, SMALL JAW

## (undated) DEVICE — SUTURE, VICRYL, 2-0, 27 IN, SH, UNDYED

## (undated) DEVICE — GLOVE, SURGICAL, PROTEXIS PI , 7.5, PF, LF

## (undated) DEVICE — PREP TRAY, SKIN, DRY, W/GLOVES

## (undated) DEVICE — CATHETER, FOLEY, 3-WAY, 5ML, 16FR, SILICONE, LF

## (undated) DEVICE — PAD, SANITARY, OBSTETRICAL, W/ADHSV STRIP,11 IN,LF

## (undated) DEVICE — STAPLER, SKIN, PLUS, WIDE, 35

## (undated) DEVICE — REST, HEAD, BAGEL, 9 IN

## (undated) DEVICE — TOWEL, SURGICAL, NEURO, O/R, 16 X 26, BLUE, STERILE

## (undated) DEVICE — SUTURE, ETHILON, 2-0, 18 IN, FS, BLACK, BX/12

## (undated) DEVICE — SPONGE, LAP, XRAY DECT, 18IN X 18IN, W/MASTER DMT, STERILE

## (undated) DEVICE — SPONGE, LAP, XRAY DECT, 18IN X 18IN, W/LOOP, STERILE

## (undated) DEVICE — SUTURE, PROLENE, 0, 30 IN, SH

## (undated) DEVICE — SUTURE, VICRYL PLUS 3-0, SH, 27IN